# Patient Record
Sex: FEMALE | Race: WHITE | NOT HISPANIC OR LATINO | Employment: OTHER | RURAL
[De-identification: names, ages, dates, MRNs, and addresses within clinical notes are randomized per-mention and may not be internally consistent; named-entity substitution may affect disease eponyms.]

---

## 2021-04-08 DIAGNOSIS — G43.101 MIGRAINE WITH AURA AND WITH STATUS MIGRAINOSUS, NOT INTRACTABLE: Primary | ICD-10-CM

## 2021-04-08 RX ORDER — HYDROCHLOROTHIAZIDE 25 MG/1
25 TABLET ORAL DAILY
COMMUNITY
Start: 2021-03-08 | End: 2021-05-25 | Stop reason: SDUPTHER

## 2021-04-08 RX ORDER — ATORVASTATIN CALCIUM 20 MG/1
20 TABLET, FILM COATED ORAL DAILY
COMMUNITY
Start: 2021-02-14 | End: 2021-05-25 | Stop reason: SDUPTHER

## 2021-04-08 RX ORDER — SUMATRIPTAN SUCCINATE 100 MG/1
100 TABLET ORAL
COMMUNITY
End: 2021-04-08 | Stop reason: SDUPTHER

## 2021-04-08 RX ORDER — SUMATRIPTAN SUCCINATE 100 MG/1
TABLET ORAL
Qty: 9 TABLET | Refills: 5 | Status: SHIPPED | OUTPATIENT
Start: 2021-04-08 | End: 2021-08-26

## 2021-04-08 RX ORDER — LEVOTHYROXINE SODIUM 88 UG/1
88 TABLET ORAL DAILY
COMMUNITY
Start: 2021-02-14 | End: 2021-06-07 | Stop reason: SDUPTHER

## 2021-04-08 RX ORDER — PROMETHAZINE HYDROCHLORIDE 25 MG/1
50 TABLET ORAL EVERY 4 HOURS PRN
COMMUNITY
End: 2021-12-01 | Stop reason: SDUPTHER

## 2021-04-08 RX ORDER — SUMATRIPTAN SUCCINATE 6 MG/.5ML
0.5 INJECTION, SOLUTION SUBCUTANEOUS
COMMUNITY
End: 2023-12-18 | Stop reason: SDUPTHER

## 2021-05-19 ENCOUNTER — OFFICE VISIT (OUTPATIENT)
Dept: FAMILY MEDICINE | Facility: CLINIC | Age: 66
End: 2021-05-19
Payer: MEDICARE

## 2021-05-19 VITALS
BODY MASS INDEX: 36.29 KG/M2 | WEIGHT: 231.19 LBS | HEART RATE: 64 BPM | OXYGEN SATURATION: 96 % | SYSTOLIC BLOOD PRESSURE: 138 MMHG | HEIGHT: 67 IN | RESPIRATION RATE: 20 BRPM | DIASTOLIC BLOOD PRESSURE: 87 MMHG | TEMPERATURE: 98 F

## 2021-05-19 DIAGNOSIS — Z79.899 ENCOUNTER FOR LONG-TERM (CURRENT) USE OF OTHER MEDICATIONS: ICD-10-CM

## 2021-05-19 DIAGNOSIS — E78.2 MIXED HYPERLIPIDEMIA: ICD-10-CM

## 2021-05-19 DIAGNOSIS — K80.20 CALCULUS OF GALLBLADDER WITHOUT CHOLECYSTITIS WITHOUT OBSTRUCTION: ICD-10-CM

## 2021-05-19 DIAGNOSIS — E03.9 ACQUIRED HYPOTHYROIDISM: ICD-10-CM

## 2021-05-19 DIAGNOSIS — Z11.59 NEED FOR HEPATITIS C SCREENING TEST: ICD-10-CM

## 2021-05-19 DIAGNOSIS — G43.709 CHRONIC MIGRAINE WITHOUT AURA WITHOUT STATUS MIGRAINOSUS, NOT INTRACTABLE: ICD-10-CM

## 2021-05-19 DIAGNOSIS — I10 ESSENTIAL HYPERTENSION: Primary | ICD-10-CM

## 2021-05-19 PROCEDURE — 99214 OFFICE O/P EST MOD 30 MIN: CPT | Mod: ,,, | Performed by: NURSE PRACTITIONER

## 2021-05-19 PROCEDURE — 99214 PR OFFICE/OUTPT VISIT, EST, LEVL IV, 30-39 MIN: ICD-10-PCS | Mod: ,,, | Performed by: NURSE PRACTITIONER

## 2021-05-25 DIAGNOSIS — E78.2 MIXED HYPERLIPIDEMIA: ICD-10-CM

## 2021-05-25 DIAGNOSIS — I10 ESSENTIAL HYPERTENSION: Primary | ICD-10-CM

## 2021-05-25 RX ORDER — HYDROCHLOROTHIAZIDE 25 MG/1
25 TABLET ORAL DAILY
Qty: 90 TABLET | Refills: 1 | Status: SHIPPED | OUTPATIENT
Start: 2021-05-25 | End: 2021-11-24

## 2021-05-25 RX ORDER — ATORVASTATIN CALCIUM 20 MG/1
20 TABLET, FILM COATED ORAL DAILY
Qty: 90 TABLET | Refills: 3 | Status: SHIPPED | OUTPATIENT
Start: 2021-05-25 | End: 2021-12-01 | Stop reason: SDUPTHER

## 2021-06-07 RX ORDER — LEVOTHYROXINE SODIUM 88 UG/1
88 TABLET ORAL DAILY
Qty: 90 TABLET | Refills: 1 | Status: SHIPPED | OUTPATIENT
Start: 2021-06-07 | End: 2021-12-01 | Stop reason: SDUPTHER

## 2021-06-24 ENCOUNTER — OFFICE VISIT (OUTPATIENT)
Dept: FAMILY MEDICINE | Facility: CLINIC | Age: 66
End: 2021-06-24
Payer: MEDICARE

## 2021-06-24 VITALS
OXYGEN SATURATION: 98 % | HEIGHT: 67 IN | BODY MASS INDEX: 35.75 KG/M2 | RESPIRATION RATE: 20 BRPM | DIASTOLIC BLOOD PRESSURE: 78 MMHG | HEART RATE: 75 BPM | SYSTOLIC BLOOD PRESSURE: 123 MMHG | TEMPERATURE: 97 F | WEIGHT: 227.81 LBS

## 2021-06-24 DIAGNOSIS — M85.89 OTHER SPECIFIED DISORDERS OF BONE DENSITY AND STRUCTURE, MULTIPLE SITES: ICD-10-CM

## 2021-06-24 DIAGNOSIS — Z12.31 ENCOUNTER FOR SCREENING MAMMOGRAM FOR MALIGNANT NEOPLASM OF BREAST: ICD-10-CM

## 2021-06-24 DIAGNOSIS — E03.9 HYPOTHYROIDISM (ACQUIRED): ICD-10-CM

## 2021-06-24 DIAGNOSIS — Z00.00 ROUTINE GENERAL MEDICAL EXAMINATION AT A HEALTH CARE FACILITY: Primary | ICD-10-CM

## 2021-06-24 DIAGNOSIS — I10 ESSENTIAL HYPERTENSION: ICD-10-CM

## 2021-06-24 DIAGNOSIS — Z13.5 ENCOUNTER FOR SCREENING FOR EYE AND EAR DISORDERS: ICD-10-CM

## 2021-06-24 PROCEDURE — G0402 PR WELCOME MEDICARE PREVENTIVE VISIT NEW ENROLLEE: ICD-10-PCS | Mod: ,,, | Performed by: NURSE PRACTITIONER

## 2021-06-24 PROCEDURE — G0402 INITIAL PREVENTIVE EXAM: HCPCS | Mod: ,,, | Performed by: NURSE PRACTITIONER

## 2021-06-24 RX ORDER — KETOROLAC TROMETHAMINE 10 MG/1
10 TABLET, FILM COATED ORAL EVERY 6 HOURS
COMMUNITY
Start: 2021-04-20 | End: 2021-12-01

## 2021-07-01 ENCOUNTER — HOSPITAL ENCOUNTER (OUTPATIENT)
Dept: RADIOLOGY | Facility: HOSPITAL | Age: 66
Discharge: HOME | End: 2021-07-01
Attending: NURSE PRACTITIONER
Payer: MEDICARE

## 2021-07-01 VITALS — BODY MASS INDEX: 34.1 KG/M2 | HEIGHT: 68 IN | WEIGHT: 225 LBS

## 2021-07-01 DIAGNOSIS — M85.89 OTHER SPECIFIED DISORDERS OF BONE DENSITY AND STRUCTURE, MULTIPLE SITES: ICD-10-CM

## 2021-07-01 DIAGNOSIS — Z12.31 ENCOUNTER FOR SCREENING MAMMOGRAM FOR MALIGNANT NEOPLASM OF BREAST: ICD-10-CM

## 2021-07-01 PROCEDURE — 77080 DEXA BONE DENSITY SPINE HIP: ICD-10-PCS | Mod: 26,,, | Performed by: RADIOLOGY

## 2021-07-01 PROCEDURE — 77080 DXA BONE DENSITY AXIAL: CPT | Mod: 26,,, | Performed by: RADIOLOGY

## 2021-07-01 PROCEDURE — 77067 SCR MAMMO BI INCL CAD: CPT | Mod: TC

## 2021-07-01 PROCEDURE — 77080 DXA BONE DENSITY AXIAL: CPT | Mod: TC

## 2021-07-14 ENCOUNTER — OFFICE VISIT (OUTPATIENT)
Dept: FAMILY MEDICINE | Facility: CLINIC | Age: 66
End: 2021-07-14
Payer: MEDICARE

## 2021-07-14 VITALS
OXYGEN SATURATION: 97 % | DIASTOLIC BLOOD PRESSURE: 80 MMHG | RESPIRATION RATE: 20 BRPM | WEIGHT: 230 LBS | HEART RATE: 75 BPM | SYSTOLIC BLOOD PRESSURE: 122 MMHG | BODY MASS INDEX: 36.1 KG/M2 | HEIGHT: 67 IN | TEMPERATURE: 97 F

## 2021-07-14 DIAGNOSIS — S16.1XXA STRAIN OF NECK MUSCLE, INITIAL ENCOUNTER: Primary | ICD-10-CM

## 2021-07-14 DIAGNOSIS — M54.9 UPPER BACK PAIN ON RIGHT SIDE: ICD-10-CM

## 2021-07-14 PROCEDURE — 96372 THER/PROPH/DIAG INJ SC/IM: CPT | Mod: ,,, | Performed by: NURSE PRACTITIONER

## 2021-07-14 PROCEDURE — 99213 OFFICE O/P EST LOW 20 MIN: CPT | Mod: 25,,, | Performed by: NURSE PRACTITIONER

## 2021-07-14 PROCEDURE — 99213 PR OFFICE/OUTPT VISIT, EST, LEVL III, 20-29 MIN: ICD-10-PCS | Mod: 25,,, | Performed by: NURSE PRACTITIONER

## 2021-07-14 PROCEDURE — 96372 PR INJECTION,THERAP/PROPH/DIAG2ST, IM OR SUBCUT: ICD-10-PCS | Mod: ,,, | Performed by: NURSE PRACTITIONER

## 2021-07-14 RX ORDER — METHYLPREDNISOLONE ACETATE 40 MG/ML
40 INJECTION, SUSPENSION INTRA-ARTICULAR; INTRALESIONAL; INTRAMUSCULAR; SOFT TISSUE
Status: COMPLETED | OUTPATIENT
Start: 2021-07-14 | End: 2021-07-14

## 2021-07-14 RX ORDER — METHOCARBAMOL 750 MG/1
750 TABLET, FILM COATED ORAL 4 TIMES DAILY PRN
Qty: 40 TABLET | Refills: 0 | Status: SHIPPED | OUTPATIENT
Start: 2021-07-14 | End: 2021-07-24

## 2021-07-14 RX ORDER — DEXAMETHASONE SODIUM PHOSPHATE 4 MG/ML
6 INJECTION, SOLUTION INTRA-ARTICULAR; INTRALESIONAL; INTRAMUSCULAR; INTRAVENOUS; SOFT TISSUE
Status: COMPLETED | OUTPATIENT
Start: 2021-07-14 | End: 2021-07-14

## 2021-07-14 RX ADMIN — DEXAMETHASONE SODIUM PHOSPHATE 6 MG: 4 INJECTION, SOLUTION INTRA-ARTICULAR; INTRALESIONAL; INTRAMUSCULAR; INTRAVENOUS; SOFT TISSUE at 01:07

## 2021-07-14 RX ADMIN — METHYLPREDNISOLONE ACETATE 40 MG: 40 INJECTION, SUSPENSION INTRA-ARTICULAR; INTRALESIONAL; INTRAMUSCULAR; SOFT TISSUE at 01:07

## 2021-11-18 DIAGNOSIS — I10 ESSENTIAL HYPERTENSION: ICD-10-CM

## 2021-11-18 DIAGNOSIS — E03.9 ACQUIRED HYPOTHYROIDISM: ICD-10-CM

## 2021-11-23 ENCOUNTER — TELEPHONE (OUTPATIENT)
Dept: FAMILY MEDICINE | Facility: CLINIC | Age: 66
End: 2021-11-23
Payer: MEDICARE

## 2021-11-24 RX ORDER — LIOTHYRONINE SODIUM 5 UG/1
5 TABLET ORAL DAILY
Qty: 90 TABLET | Refills: 1 | Status: SHIPPED | OUTPATIENT
Start: 2021-11-24 | End: 2021-12-01

## 2021-11-24 RX ORDER — HYDROCHLOROTHIAZIDE 25 MG/1
25 TABLET ORAL DAILY
Qty: 90 TABLET | Refills: 1 | Status: SHIPPED | OUTPATIENT
Start: 2021-11-24 | End: 2021-12-01 | Stop reason: SDUPTHER

## 2021-12-01 ENCOUNTER — OFFICE VISIT (OUTPATIENT)
Dept: FAMILY MEDICINE | Facility: CLINIC | Age: 66
End: 2021-12-01
Payer: MEDICARE

## 2021-12-01 ENCOUNTER — PATIENT MESSAGE (OUTPATIENT)
Dept: FAMILY MEDICINE | Facility: CLINIC | Age: 66
End: 2021-12-01
Payer: MEDICARE

## 2021-12-01 VITALS
DIASTOLIC BLOOD PRESSURE: 78 MMHG | WEIGHT: 231.19 LBS | SYSTOLIC BLOOD PRESSURE: 128 MMHG | HEART RATE: 64 BPM | TEMPERATURE: 97 F | BODY MASS INDEX: 36.21 KG/M2 | OXYGEN SATURATION: 97 %

## 2021-12-01 DIAGNOSIS — I10 ESSENTIAL HYPERTENSION: ICD-10-CM

## 2021-12-01 DIAGNOSIS — I10 PRIMARY HYPERTENSION: ICD-10-CM

## 2021-12-01 DIAGNOSIS — Z79.899 ENCOUNTER FOR LONG-TERM (CURRENT) USE OF OTHER MEDICATIONS: ICD-10-CM

## 2021-12-01 DIAGNOSIS — M15.9 PRIMARY OSTEOARTHRITIS INVOLVING MULTIPLE JOINTS: ICD-10-CM

## 2021-12-01 DIAGNOSIS — G43.709 CHRONIC MIGRAINE WITHOUT AURA WITHOUT STATUS MIGRAINOSUS, NOT INTRACTABLE: ICD-10-CM

## 2021-12-01 DIAGNOSIS — G43.101 MIGRAINE WITH AURA AND WITH STATUS MIGRAINOSUS, NOT INTRACTABLE: ICD-10-CM

## 2021-12-01 DIAGNOSIS — E03.9 HYPOTHYROIDISM (ACQUIRED): Primary | ICD-10-CM

## 2021-12-01 DIAGNOSIS — E78.2 MIXED HYPERLIPIDEMIA: ICD-10-CM

## 2021-12-01 PROCEDURE — 99214 OFFICE O/P EST MOD 30 MIN: CPT | Mod: ,,, | Performed by: NURSE PRACTITIONER

## 2021-12-01 PROCEDURE — 99214 PR OFFICE/OUTPT VISIT, EST, LEVL IV, 30-39 MIN: ICD-10-PCS | Mod: ,,, | Performed by: NURSE PRACTITIONER

## 2021-12-01 RX ORDER — HYDROCHLOROTHIAZIDE 25 MG/1
25 TABLET ORAL DAILY
Qty: 90 TABLET | Refills: 1 | Status: SHIPPED | OUTPATIENT
Start: 2021-12-01 | End: 2022-06-06 | Stop reason: SDUPTHER

## 2021-12-01 RX ORDER — PROMETHAZINE HYDROCHLORIDE 25 MG/1
50 TABLET ORAL EVERY 4 HOURS PRN
Qty: 30 TABLET | Refills: 1 | Status: SHIPPED | OUTPATIENT
Start: 2021-12-01 | End: 2023-12-18 | Stop reason: SDUPTHER

## 2021-12-01 RX ORDER — LEVOTHYROXINE SODIUM 88 UG/1
88 TABLET ORAL DAILY
Qty: 90 TABLET | Refills: 1 | Status: SHIPPED | OUTPATIENT
Start: 2021-12-01 | End: 2022-06-06 | Stop reason: SDUPTHER

## 2021-12-01 RX ORDER — SUMATRIPTAN SUCCINATE 100 MG/1
100 TABLET ORAL
Qty: 9 TABLET | Refills: 5 | Status: SHIPPED | OUTPATIENT
Start: 2021-12-01 | End: 2022-04-18

## 2021-12-01 RX ORDER — ATORVASTATIN CALCIUM 20 MG/1
20 TABLET, FILM COATED ORAL DAILY
Qty: 90 TABLET | Refills: 3 | Status: SHIPPED | OUTPATIENT
Start: 2021-12-01 | End: 2022-05-16 | Stop reason: SDUPTHER

## 2022-03-11 DIAGNOSIS — Z71.89 COMPLEX CARE COORDINATION: ICD-10-CM

## 2022-04-25 ENCOUNTER — OFFICE VISIT (OUTPATIENT)
Dept: OBSTETRICS AND GYNECOLOGY | Facility: CLINIC | Age: 67
End: 2022-04-25
Payer: MEDICARE

## 2022-04-25 VITALS
BODY MASS INDEX: 35.55 KG/M2 | WEIGHT: 226.5 LBS | DIASTOLIC BLOOD PRESSURE: 81 MMHG | SYSTOLIC BLOOD PRESSURE: 111 MMHG | HEIGHT: 67 IN

## 2022-04-25 DIAGNOSIS — Z12.4 ENCOUNTER FOR SCREENING FOR MALIGNANT NEOPLASM OF CERVIX: ICD-10-CM

## 2022-04-25 DIAGNOSIS — Z01.419 WOMEN'S ANNUAL ROUTINE GYNECOLOGICAL EXAMINATION: Primary | ICD-10-CM

## 2022-04-25 DIAGNOSIS — N89.8 VAGINAL ITCHING: ICD-10-CM

## 2022-04-25 PROCEDURE — G0123 SCREEN CERV/VAG THIN LAYER: HCPCS | Mod: GCY | Performed by: OBSTETRICS & GYNECOLOGY

## 2022-04-25 PROCEDURE — G0101 PR CA SCREEN;PELVIC/BREAST EXAM: ICD-10-PCS | Mod: S$PBB,,, | Performed by: OBSTETRICS & GYNECOLOGY

## 2022-04-25 PROCEDURE — G0101 CA SCREEN;PELVIC/BREAST EXAM: HCPCS | Mod: S$PBB,,, | Performed by: OBSTETRICS & GYNECOLOGY

## 2022-04-25 PROCEDURE — 99213 OFFICE O/P EST LOW 20 MIN: CPT | Mod: PBBFAC | Performed by: OBSTETRICS & GYNECOLOGY

## 2022-04-25 RX ORDER — METRONIDAZOLE 7.5 MG/G
1 GEL VAGINAL NIGHTLY
Qty: 70 G | Refills: 0 | Status: SHIPPED | OUTPATIENT
Start: 2022-04-25 | End: 2022-04-30

## 2022-04-28 LAB
GH SERPL-MCNC: NORMAL NG/ML
INSULIN SERPL-ACNC: NORMAL U[IU]/ML
LAB AP CLINICAL INFORMATION: NORMAL
LAB AP GYN INTERPRETATION: NEGATIVE
LAB AP PAP DISCLAIMER COMMENTS: NORMAL
RENIN PLAS-CCNC: NORMAL NG/ML/H

## 2022-05-03 ENCOUNTER — OFFICE VISIT (OUTPATIENT)
Dept: FAMILY MEDICINE | Facility: CLINIC | Age: 67
End: 2022-05-03
Payer: MEDICARE

## 2022-05-03 VITALS
WEIGHT: 222 LBS | RESPIRATION RATE: 16 BRPM | BODY MASS INDEX: 34.84 KG/M2 | HEART RATE: 85 BPM | OXYGEN SATURATION: 99 % | DIASTOLIC BLOOD PRESSURE: 84 MMHG | TEMPERATURE: 98 F | HEIGHT: 67 IN | SYSTOLIC BLOOD PRESSURE: 118 MMHG

## 2022-05-03 DIAGNOSIS — J02.9 SORE THROAT: ICD-10-CM

## 2022-05-03 DIAGNOSIS — J40 BRONCHITIS: Primary | ICD-10-CM

## 2022-05-03 DIAGNOSIS — Z11.52 ENCOUNTER FOR SCREENING LABORATORY TESTING FOR COVID-19 VIRUS: ICD-10-CM

## 2022-05-03 LAB
CTP QC/QA: YES
CTP QC/QA: YES
FLUAV AG NPH QL: NEGATIVE
FLUBV AG NPH QL: NEGATIVE
S PYO RRNA THROAT QL PROBE: NEGATIVE
SARS-COV-2 AG RESP QL IA.RAPID: NEGATIVE

## 2022-05-03 PROCEDURE — 96372 THER/PROPH/DIAG INJ SC/IM: CPT | Mod: ,,, | Performed by: NURSE PRACTITIONER

## 2022-05-03 PROCEDURE — 99213 OFFICE O/P EST LOW 20 MIN: CPT | Mod: ,,, | Performed by: NURSE PRACTITIONER

## 2022-05-03 PROCEDURE — 87880 STREP A ASSAY W/OPTIC: CPT | Mod: RHCUB | Performed by: NURSE PRACTITIONER

## 2022-05-03 PROCEDURE — 96372 PR INJECTION,THERAP/PROPH/DIAG2ST, IM OR SUBCUT: ICD-10-PCS | Mod: ,,, | Performed by: NURSE PRACTITIONER

## 2022-05-03 PROCEDURE — 99213 PR OFFICE/OUTPT VISIT, EST, LEVL III, 20-29 MIN: ICD-10-PCS | Mod: ,,, | Performed by: NURSE PRACTITIONER

## 2022-05-03 PROCEDURE — 87428 SARSCOV & INF VIR A&B AG IA: CPT | Mod: RHCUB | Performed by: NURSE PRACTITIONER

## 2022-05-03 RX ORDER — CEFDINIR 300 MG/1
300 CAPSULE ORAL 2 TIMES DAILY
Qty: 20 CAPSULE | Refills: 0 | Status: SHIPPED | OUTPATIENT
Start: 2022-05-03 | End: 2022-05-13

## 2022-05-03 RX ORDER — DEXAMETHASONE SODIUM PHOSPHATE 4 MG/ML
6 INJECTION, SOLUTION INTRA-ARTICULAR; INTRALESIONAL; INTRAMUSCULAR; INTRAVENOUS; SOFT TISSUE
Status: COMPLETED | OUTPATIENT
Start: 2022-05-03 | End: 2022-05-03

## 2022-05-03 RX ORDER — PROMETHAZINE HYDROCHLORIDE AND DEXTROMETHORPHAN HYDROBROMIDE 6.25; 15 MG/5ML; MG/5ML
5 SYRUP ORAL EVERY 6 HOURS PRN
Qty: 150 ML | Refills: 0 | Status: SHIPPED | OUTPATIENT
Start: 2022-05-03 | End: 2022-05-13

## 2022-05-03 RX ORDER — PREDNISONE 20 MG/1
TABLET ORAL
Qty: 10 TABLET | Refills: 0 | Status: SHIPPED | OUTPATIENT
Start: 2022-05-03 | End: 2022-06-06

## 2022-05-03 RX ADMIN — DEXAMETHASONE SODIUM PHOSPHATE 6 MG: 4 INJECTION, SOLUTION INTRA-ARTICULAR; INTRALESIONAL; INTRAMUSCULAR; INTRAVENOUS; SOFT TISSUE at 02:05

## 2022-05-03 NOTE — PROGRESS NOTES
"Subjective:       Patient ID: Dominic Casas is a 66 y.o. female.    Chief Complaint: Sinusitis (Sore throat started on Sunday.   Cough described as deep/wet cough)    Presents to clinic as above. Also c/o body aches. No hx of asthma. Nonsmoker. No hx of COPD.    Review of Systems   Constitutional: Negative.    HENT: Positive for congestion, sinus pain and sore throat. Negative for ear pain.    Eyes: Negative.    Respiratory: Positive for cough.    Cardiovascular: Negative.    Musculoskeletal: Positive for myalgias.   Skin: Negative.    Neurological: Negative.           Reviewed family, medical, surgical, and social history.    Objective:      /84   Pulse 85   Temp 98.2 °F (36.8 °C)   Resp 16   Ht 5' 7" (1.702 m)   Wt 100.7 kg (222 lb)   SpO2 99%   BMI 34.77 kg/m²   Physical Exam  Vitals and nursing note reviewed.   Constitutional:       Appearance: Normal appearance.   HENT:      Head: Normocephalic and atraumatic.      Right Ear: Hearing, tympanic membrane, ear canal and external ear normal.      Left Ear: Hearing, tympanic membrane, ear canal and external ear normal.      Nose: Nasal tenderness, mucosal edema, congestion and rhinorrhea present. Rhinorrhea is purulent.      Right Turbinates: Enlarged and swollen.      Left Turbinates: Enlarged and swollen.      Right Sinus: Maxillary sinus tenderness and frontal sinus tenderness present.      Left Sinus: Maxillary sinus tenderness and frontal sinus tenderness present.      Mouth/Throat:      Mouth: Mucous membranes are moist.   Cardiovascular:      Rate and Rhythm: Normal rate and regular rhythm.      Heart sounds: Normal heart sounds.   Pulmonary:      Effort: Pulmonary effort is normal. No respiratory distress.      Breath sounds: No stridor. Rhonchi present. No wheezing or rales.   Chest:      Chest wall: No tenderness.   Skin:     General: Skin is warm and dry.   Neurological:      Mental Status: She is alert.   Psychiatric:         Mood and Affect: " Mood normal.         Behavior: Behavior normal.         Thought Content: Thought content normal.         Judgment: Judgment normal.            Office Visit on 05/03/2022   Component Date Value Ref Range Status    Rapid Strep A Screen 05/03/2022 Negative  Negative Final     Acceptable 05/03/2022 Yes   Final    SARS Coronavirus 2 Antigen 05/03/2022 Negative  Negative Final    Rapid Influenza A Ag 05/03/2022 Negative  Negative Final    Rapid Influenza B Ag 05/03/2022 Negative  Negative Final     Acceptable 05/03/2022 Yes   Final      Assessment:       1. Bronchitis    2. Sore throat    3. Encounter for screening laboratory testing for COVID-19 virus        Plan:       Bronchitis  -     cefdinir (OMNICEF) 300 MG capsule; Take 1 capsule (300 mg total) by mouth 2 (two) times daily. for 10 days  Dispense: 20 capsule; Refill: 0  -     predniSONE (DELTASONE) 20 MG tablet; Take 2 po daily  Dispense: 10 tablet; Refill: 0  -     promethazine-dextromethorphan (PROMETHAZINE-DM) 6.25-15 mg/5 mL Syrp; Take 5 mLs by mouth every 6 (six) hours as needed (cough).  Dispense: 150 mL; Refill: 0  -     dexamethasone injection 6 mg    Sore throat  -     POCT rapid strep A    Encounter for screening laboratory testing for COVID-19 virus  -     POCT SARS-COV2 (COVID) with Flu Antigen    Covid/flu neg  RTC PRN          Risks, benefits, and side effects were discussed with the patient. All questions were answered to the fullest satisfaction of the patient, and pt verbalized understanding and agreement to treatment plan. Pt was to call with any new or worsening symptoms, or present to the ER.

## 2022-05-16 DIAGNOSIS — E78.2 MIXED HYPERLIPIDEMIA: ICD-10-CM

## 2022-05-16 RX ORDER — ATORVASTATIN CALCIUM 20 MG/1
20 TABLET, FILM COATED ORAL DAILY
Qty: 90 TABLET | Refills: 3 | Status: SHIPPED | OUTPATIENT
Start: 2022-05-16 | End: 2022-06-06 | Stop reason: SDUPTHER

## 2022-05-16 NOTE — TELEPHONE ENCOUNTER
----- Message from Amber Ly sent at 5/16/2022  9:20 AM CDT -----  Patient needs a refill on DELTASONE called into [facility]  pharmacy on [address]  at [phone].  Please call patient at [phone] if you have any questions. Thanks!

## 2022-05-16 NOTE — PROGRESS NOTES
Dominic ROBB Augusto female  for   Chief Complaint   Patient presents with    Annual Exam     CU/Pap,     Vaginal Itching     C/O vaginal itching at night x 1 month      OB History        0    Para   0    Term   0       0    AB   0    Living   0       SAB   0    IAB   0    Ectopic   0    Multiple   0    Live Births   0                  Past Medical History:   Diagnosis Date    Cholelithiasis     21 per US (report scanned in); HIDA scan 21 normal w/ EF49% (scanned in)    Chronic migraine without aura or status migrainosus     Hyperlipidemia     Hypertension     Hypothyroidism     Lumbar degenerative disc disease     Osteoarthritis of multiple joints     Spondylosis of cervical spine     Spondylosis, lumbosacral       Past Surgical History:   Procedure Laterality Date    PARTIAL KNEE ARTHROPLASTY Right 2017    Dr. Jacobs    SHOULDER ARTHROSCOPY Left 2012    left rotator cuff repair    TOTAL HIP ARTHROPLASTY Left 2017    TOTAL HIP ARTHROPLASTY Right 10/07/2019    [place]      Review of patient's allergies indicates:  No Known Allergies          Physical exam:     General Appearance: healthy    Chest:chest clear, no wheezing, rales, normal symmetric air entry, Heart exam - S1, S2 normal, no murmur, no gallop, rate regular    Breast:  normal appearance, no masses or tenderness    Abdomen:Normal, benign.    Pelvic: Pelvic exam: normal external genitalia, vulva, vagina, cervix, uterus and adnexa, VULVA: normal appearing vulva with no masses, tenderness or lesions, CERVIX: normal appearing cervix without discharge or lesions, UTERUS: uterus is normal size, shape, consistency and nontender, ADNEXA: normal adnexa in size, nontender and no masses, RECTAL: normal rectal, no masses, guaiac negative stool obtained.     Extremity:normal    Skin: normal exam        Assessment:   Problem List Items Addressed This Visit    None     Visit Diagnoses     Women's annual routine  gynecological examination    -  Primary    Relevant Orders    ThinPrep Pap Test (Completed)    Vaginal itching        Encounter for screening for malignant neoplasm of cervix        Relevant Orders    ThinPrep Pap Test (Completed)           Plan:  The patient has a mammogram appointment in July.  She had a Cologuard 1-2 years ago.  A Pap smear was done today.  Her vaginal pH today is 7.5 and she was prescribed Metrogel.

## 2022-05-16 NOTE — TELEPHONE ENCOUNTER
This was prescribed by a different provider for bronchitis. We do not refill these medications. Pt will need to be evaluated, again, if not better.

## 2022-05-16 NOTE — TELEPHONE ENCOUNTER
----- Message from Ofelia Chopra sent at 5/16/2022 11:23 AM CDT -----  Patient needs a refill on atorvastatin called into [facility]  pharmacy.   Please call patient at [phone] if you have any questions. Thanks!

## 2022-06-06 ENCOUNTER — OFFICE VISIT (OUTPATIENT)
Dept: FAMILY MEDICINE | Facility: CLINIC | Age: 67
End: 2022-06-06
Payer: MEDICARE

## 2022-06-06 VITALS
DIASTOLIC BLOOD PRESSURE: 82 MMHG | HEIGHT: 67 IN | SYSTOLIC BLOOD PRESSURE: 124 MMHG | RESPIRATION RATE: 20 BRPM | BODY MASS INDEX: 36 KG/M2 | HEART RATE: 66 BPM | TEMPERATURE: 97 F | WEIGHT: 229.38 LBS | OXYGEN SATURATION: 98 %

## 2022-06-06 DIAGNOSIS — E78.2 MIXED HYPERLIPIDEMIA: ICD-10-CM

## 2022-06-06 DIAGNOSIS — E03.9 HYPOTHYROIDISM (ACQUIRED): ICD-10-CM

## 2022-06-06 DIAGNOSIS — I10 ESSENTIAL HYPERTENSION: Primary | ICD-10-CM

## 2022-06-06 LAB
ALBUMIN SERPL BCP-MCNC: 3.9 G/DL (ref 3.5–5)
ALBUMIN/GLOB SERPL: 1.1 {RATIO}
ALP SERPL-CCNC: 110 U/L (ref 55–142)
ALT SERPL W P-5'-P-CCNC: 39 U/L (ref 13–56)
ANION GAP SERPL CALCULATED.3IONS-SCNC: 12 MMOL/L (ref 7–16)
AST SERPL W P-5'-P-CCNC: 26 U/L (ref 15–37)
BASOPHILS # BLD AUTO: 0.03 K/UL (ref 0–0.2)
BASOPHILS NFR BLD AUTO: 0.5 % (ref 0–1)
BILIRUB SERPL-MCNC: 0.4 MG/DL (ref 0–1.2)
BILIRUB UR QL STRIP: NEGATIVE
BUN SERPL-MCNC: 14 MG/DL (ref 7–18)
BUN/CREAT SERPL: 13 (ref 6–20)
CALCIUM SERPL-MCNC: 9.5 MG/DL (ref 8.5–10.1)
CHLORIDE SERPL-SCNC: 104 MMOL/L (ref 98–107)
CHOLEST SERPL-MCNC: 137 MG/DL (ref 0–200)
CHOLEST/HDLC SERPL: 2.9 {RATIO}
CLARITY UR: CLEAR
CO2 SERPL-SCNC: 29 MMOL/L (ref 21–32)
COLOR UR: YELLOW
CREAT SERPL-MCNC: 1.04 MG/DL (ref 0.55–1.02)
CREAT UR-MCNC: 90 MG/DL (ref 28–219)
DIFFERENTIAL METHOD BLD: ABNORMAL
EOSINOPHIL # BLD AUTO: 0.31 K/UL (ref 0–0.5)
EOSINOPHIL NFR BLD AUTO: 4.9 % (ref 1–4)
ERYTHROCYTE [DISTWIDTH] IN BLOOD BY AUTOMATED COUNT: 15.1 % (ref 11.5–14.5)
GLOBULIN SER-MCNC: 3.5 G/DL (ref 2–4)
GLUCOSE SERPL-MCNC: 91 MG/DL (ref 74–106)
GLUCOSE UR STRIP-MCNC: NEGATIVE MG/DL
HCT VFR BLD AUTO: 41 % (ref 38–47)
HDLC SERPL-MCNC: 48 MG/DL (ref 40–60)
HGB BLD-MCNC: 13 G/DL (ref 12–16)
IMM GRANULOCYTES # BLD AUTO: 0.04 K/UL (ref 0–0.04)
IMM GRANULOCYTES NFR BLD: 0.6 % (ref 0–0.4)
KETONES UR STRIP-SCNC: NEGATIVE MG/DL
LDLC SERPL CALC-MCNC: 67 MG/DL
LDLC/HDLC SERPL: 1.4 {RATIO}
LEUKOCYTE ESTERASE UR QL STRIP: NEGATIVE
LYMPHOCYTES # BLD AUTO: 1.25 K/UL (ref 1–4.8)
LYMPHOCYTES NFR BLD AUTO: 19.9 % (ref 27–41)
MCH RBC QN AUTO: 27.4 PG (ref 27–31)
MCHC RBC AUTO-ENTMCNC: 31.7 G/DL (ref 32–36)
MCV RBC AUTO: 86.5 FL (ref 80–96)
MICROALBUMIN UR-MCNC: <0.5 MG/DL (ref 0–2.8)
MICROALBUMIN/CREAT RATIO PNL UR: <5.6 MG/G (ref 0–30)
MONOCYTES # BLD AUTO: 0.5 K/UL (ref 0–0.8)
MONOCYTES NFR BLD AUTO: 8 % (ref 2–6)
MPC BLD CALC-MCNC: 9.5 FL (ref 9.4–12.4)
NEUTROPHILS # BLD AUTO: 4.15 K/UL (ref 1.8–7.7)
NEUTROPHILS NFR BLD AUTO: 66.1 % (ref 53–65)
NITRITE UR QL STRIP: NEGATIVE
NONHDLC SERPL-MCNC: 89 MG/DL
NRBC # BLD AUTO: 0 X10E3/UL
NRBC, AUTO (.00): 0 %
PH UR STRIP: 7.5 PH UNITS
PLATELET # BLD AUTO: 258 K/UL (ref 150–400)
POTASSIUM SERPL-SCNC: 4.3 MMOL/L (ref 3.5–5.1)
PROT SERPL-MCNC: 7.4 G/DL (ref 6.4–8.2)
PROT UR QL STRIP: NEGATIVE
RBC # BLD AUTO: 4.74 M/UL (ref 4.2–5.4)
RBC # UR STRIP: NEGATIVE /UL
SODIUM SERPL-SCNC: 141 MMOL/L (ref 136–145)
SP GR UR STRIP: 1.01
TRIGL SERPL-MCNC: 109 MG/DL (ref 35–150)
UROBILINOGEN UR STRIP-ACNC: 0.2 MG/DL
VLDLC SERPL-MCNC: 22 MG/DL
WBC # BLD AUTO: 6.28 K/UL (ref 4.5–11)

## 2022-06-06 PROCEDURE — 81003 URINALYSIS AUTO W/O SCOPE: CPT | Mod: QW,,, | Performed by: CLINICAL MEDICAL LABORATORY

## 2022-06-06 PROCEDURE — 99214 PR OFFICE/OUTPT VISIT, EST, LEVL IV, 30-39 MIN: ICD-10-PCS | Mod: ,,, | Performed by: NURSE PRACTITIONER

## 2022-06-06 PROCEDURE — 80061 LIPID PANEL: ICD-10-PCS | Mod: ,,, | Performed by: CLINICAL MEDICAL LABORATORY

## 2022-06-06 PROCEDURE — 85025 COMPLETE CBC W/AUTO DIFF WBC: CPT | Mod: ,,, | Performed by: CLINICAL MEDICAL LABORATORY

## 2022-06-06 PROCEDURE — 85025 CBC WITH DIFFERENTIAL: ICD-10-PCS | Mod: ,,, | Performed by: CLINICAL MEDICAL LABORATORY

## 2022-06-06 PROCEDURE — 82043 UR ALBUMIN QUANTITATIVE: CPT | Mod: ,,, | Performed by: CLINICAL MEDICAL LABORATORY

## 2022-06-06 PROCEDURE — 82570 ASSAY OF URINE CREATININE: CPT | Mod: ,,, | Performed by: CLINICAL MEDICAL LABORATORY

## 2022-06-06 PROCEDURE — 80061 LIPID PANEL: CPT | Mod: ,,, | Performed by: CLINICAL MEDICAL LABORATORY

## 2022-06-06 PROCEDURE — 99214 OFFICE O/P EST MOD 30 MIN: CPT | Mod: ,,, | Performed by: NURSE PRACTITIONER

## 2022-06-06 PROCEDURE — 82570 MICROALBUMIN / CREATININE RATIO URINE: ICD-10-PCS | Mod: ,,, | Performed by: CLINICAL MEDICAL LABORATORY

## 2022-06-06 PROCEDURE — 80053 COMPREHEN METABOLIC PANEL: CPT | Mod: ,,, | Performed by: CLINICAL MEDICAL LABORATORY

## 2022-06-06 PROCEDURE — 80053 COMPREHENSIVE METABOLIC PANEL: ICD-10-PCS | Mod: ,,, | Performed by: CLINICAL MEDICAL LABORATORY

## 2022-06-06 PROCEDURE — 82043 MICROALBUMIN / CREATININE RATIO URINE: ICD-10-PCS | Mod: ,,, | Performed by: CLINICAL MEDICAL LABORATORY

## 2022-06-06 PROCEDURE — 81003 URINALYSIS, REFLEX TO URINE CULTURE: ICD-10-PCS | Mod: QW,,, | Performed by: CLINICAL MEDICAL LABORATORY

## 2022-06-06 RX ORDER — ATORVASTATIN CALCIUM 20 MG/1
20 TABLET, FILM COATED ORAL DAILY
Qty: 90 TABLET | Refills: 3 | Status: SHIPPED | OUTPATIENT
Start: 2022-06-06 | End: 2023-07-12

## 2022-06-06 RX ORDER — HYDROCHLOROTHIAZIDE 25 MG/1
25 TABLET ORAL DAILY
Qty: 90 TABLET | Refills: 1 | Status: SHIPPED | OUTPATIENT
Start: 2022-06-06 | End: 2022-11-21 | Stop reason: ALTCHOICE

## 2022-06-06 RX ORDER — LEVOTHYROXINE SODIUM 88 UG/1
88 TABLET ORAL DAILY
Qty: 90 TABLET | Refills: 1 | Status: SHIPPED | OUTPATIENT
Start: 2022-06-06 | End: 2022-12-12 | Stop reason: SDUPTHER

## 2022-06-06 NOTE — PROGRESS NOTES
[facility]       PATIENT NAME: Dominic Casas   : 1955    AGE: 66 y.o. DATE: 2022    MRN: [id number]        Reason for Visit / Chief Complaint:  Follow-up (Pt presents for 6 month HTN, HLD, and hypothyroidism follow up. She is fasting.), Hypertension, Hyperlipidemia, and Hypothyroidism     Subjective:     HPI:  66 yr old WF presents to the office for 6m f/u   Denies any new complaints   Reports she has been struggling with weight loss for quite some time - has decreased caloric intake and increase exercise and physical activity  Reports she will lose down a few pounds then it will creep back up     Review of Systems:    Pertinent items are above noted in HPI.  Review of patient's allergies indicates:  No Known Allergies     Med List:  Current Outpatient Medications on File Prior to Visit   Medication Sig Dispense Refill    promethazine (PHENERGAN) 25 MG tablet Take 2 tablets (50 mg total) by mouth every 4 (four) hours as needed for Nausea. 1-2 tablet every 4 hours as needed 30 tablet 1    sumatriptan (IMITREX) 100 MG tablet Take 1 tablet (100 mg total) by mouth every 2 (two) hours as needed for Migraine. May repeat x 1 dose in 2 hours. No more than 200 mg (2 tablets) in 24 hours. 12 tablet 5    SUMAtriptan succinate 6 mg/0.5 mL Crtg Inject into the skin.      [DISCONTINUED] atorvastatin (LIPITOR) 20 MG tablet Take 1 tablet (20 mg total) by mouth once daily. 90 tablet 3    [DISCONTINUED] hydroCHLOROthiazide (HYDRODIURIL) 25 MG tablet Take 1 tablet (25 mg total) by mouth once daily. 90 tablet 1    [DISCONTINUED] levothyroxine (SYNTHROID) 88 MCG tablet Take 1 tablet (88 mcg total) by mouth once daily. 90 tablet 1    [DISCONTINUED] predniSONE (DELTASONE) 20 MG tablet Take 2 po daily 10 tablet 0     No current facility-administered medications on file prior to visit.       Medical/Social/Family History:  Past Medical History:   Diagnosis Date    Cholelithiasis      "4/26/21 per US (report scanned in); HIDA scan 5/7/21 normal w/ EF49% (scanned in)    Chronic migraine without aura or status migrainosus     Hyperlipidemia     Hypertension     Hypothyroidism     Lumbar degenerative disc disease     Osteoarthritis of multiple joints     Spondylosis of cervical spine     Spondylosis, lumbosacral       Social History     Tobacco Use   Smoking Status Never Smoker   Smokeless Tobacco Never Used      Social History     Substance and Sexual Activity   Alcohol Use Not Currently       Family History   Problem Relation Age of Onset    Alzheimer's disease Mother     Dementia Mother     Colon cancer Mother     Cancer Father         Waldenstrom's macroglobulinemia    No Known Problems Sister     No Known Problems Maternal Grandmother     Migraines Maternal Grandfather     No Known Problems Paternal Grandmother     No Known Problems Paternal Grandfather     No Known Problems Sister       Past Surgical History:   Procedure Laterality Date    PARTIAL KNEE ARTHROPLASTY Right 06/2017    Dr. Jacobs    SHOULDER ARTHROSCOPY Left 07/2012    left rotator cuff repair    TOTAL HIP ARTHROPLASTY Left 09/2017    TOTAL HIP ARTHROPLASTY Right 10/07/2019    [place]        Objective:      Vitals:    06/06/22 0738   BP: 124/82   BP Location: Left arm   Patient Position: Sitting   BP Method: Large (Manual)   Pulse: 66   Resp: 20   Temp: 97.3 °F (36.3 °C)   TempSrc: Temporal   SpO2: 98%   Weight: 104.1 kg (229 lb 6.4 oz)   Height: 5' 7" (1.702 m)     Body mass index is 35.93 kg/m².     Physical Exam:    General: well developed, well nourished    Head: normocephalic, atraumatic    Respiratory: unlabored respirations, no intercostal retractions or accessory muscle use, clear to auscultation without rales or wheezes    Cardiovascular: normal rate and regular rhythm, S1 and S2 normal, no murmurs noted    Abdomen: soft, nontender    Musculoskeletal: negative; full range of motion, no " tenderness, palpable spasm or pain on motion    Lymphadenopathy: No cervical or supraclavicular adenopathy    Neurological: normal without focal findings and mental status, speech normal, alert and oriented x3    Skin: Skin color, texture, turgor normal. No rashes or lesions    Psych: no auditory or visual hallucinations, no anxiety, no depression/worrying alot       Assessment:          ICD-10-CM ICD-9-CM   1. Essential hypertension  I10 401.9   2. Hypothyroidism (acquired)  E03.9 244.9   3. Mixed hyperlipidemia  E78.2 272.2        Plan:       Essential hypertension  -     hydroCHLOROthiazide (HYDRODIURIL) 25 MG tablet; Take 1 tablet (25 mg total) by mouth once daily.  Dispense: 90 tablet; Refill: 1  -     CBC Auto Differential; Future; Expected date: 06/06/2022  -     Comprehensive Metabolic Panel; Future; Expected date: 06/06/2022  -     Lipid Panel; Future; Expected date: 06/06/2022  -     Microalbumin/Creatinine Ratio, Urine; Future; Expected date: 06/06/2022  -     Urinalysis, Reflex to Urine Culture; Future; Expected date: 06/06/2022    Hypothyroidism (acquired)  -     levothyroxine (SYNTHROID) 88 MCG tablet; Take 1 tablet (88 mcg total) by mouth once daily.  Dispense: 90 tablet; Refill: 1    Mixed hyperlipidemia  -     atorvastatin (LIPITOR) 20 MG tablet; Take 1 tablet (20 mg total) by mouth once daily.  Dispense: 90 tablet; Refill: 3  -     Lipid Panel; Future; Expected date: 06/06/2022        Current Outpatient Medications:     promethazine (PHENERGAN) 25 MG tablet, Take 2 tablets (50 mg total) by mouth every 4 (four) hours as needed for Nausea. 1-2 tablet every 4 hours as needed, Disp: 30 tablet, Rfl: 1    sumatriptan (IMITREX) 100 MG tablet, Take 1 tablet (100 mg total) by mouth every 2 (two) hours as needed for Migraine. May repeat x 1 dose in 2 hours. No more than 200 mg (2 tablets) in 24 hours., Disp: 12 tablet, Rfl: 5    SUMAtriptan succinate 6 mg/0.5 mL Crtg, Inject into the skin., Disp: , Rfl:      atorvastatin (LIPITOR) 20 MG tablet, Take 1 tablet (20 mg total) by mouth once daily., Disp: 90 tablet, Rfl: 3    hydroCHLOROthiazide (HYDRODIURIL) 25 MG tablet, Take 1 tablet (25 mg total) by mouth once daily., Disp: 90 tablet, Rfl: 1    levothyroxine (SYNTHROID) 88 MCG tablet, Take 1 tablet (88 mcg total) by mouth once daily., Disp: 90 tablet, Rfl: 1      New & refilled meds:  Requested Prescriptions     Signed Prescriptions Disp Refills    hydroCHLOROthiazide (HYDRODIURIL) 25 MG tablet 90 tablet 1     Sig: Take 1 tablet (25 mg total) by mouth once daily.    levothyroxine (SYNTHROID) 88 MCG tablet 90 tablet 1     Sig: Take 1 tablet (88 mcg total) by mouth once daily.    atorvastatin (LIPITOR) 20 MG tablet 90 tablet 3     Sig: Take 1 tablet (20 mg total) by mouth once daily.       Health Maintenance/preventive screenings:  urinary microalbumin ordered     Treatment Recommendations:    Orders and follow up as documented in patient record.    Return to clinic in 6 months for htn, hld, hypothyroidism and as needed.    Signature: Cailin Osorio for HPI/ROS submitted by the patient on 6/5/2022  activity change: Yes  unexpected weight change: No  neck pain: No  hearing loss: No  rhinorrhea: No  trouble swallowing: No  eye discharge: No  visual disturbance: No  chest tightness: No  wheezing: No  chest pain: No  palpitations: No  blood in stool: No  constipation: No  vomiting: No  diarrhea: No  polydipsia: No  polyuria: No  difficulty urinating: No  hematuria: No  menstrual problem: No  dysuria: No  joint swelling: No  arthralgias: Yes  headaches: Yes  weakness: No  confusion: No  dysphoric mood: No

## 2022-07-07 ENCOUNTER — HOSPITAL ENCOUNTER (OUTPATIENT)
Dept: RADIOLOGY | Facility: HOSPITAL | Age: 67
Discharge: HOME | End: 2022-07-07
Attending: NURSE PRACTITIONER
Payer: MEDICARE

## 2022-07-07 DIAGNOSIS — Z12.31 ENCOUNTER FOR SCREENING MAMMOGRAM FOR BREAST CANCER: ICD-10-CM

## 2022-07-07 PROCEDURE — 77067 SCR MAMMO BI INCL CAD: CPT | Mod: TC

## 2022-07-31 ENCOUNTER — EXTERNAL CHRONIC CARE MANAGEMENT (OUTPATIENT)
Dept: FAMILY MEDICINE | Facility: CLINIC | Age: 67
End: 2022-07-31
Payer: MEDICARE

## 2022-07-31 PROCEDURE — G0511 PR CHRONIC CARE MGMT, RHC OR FQHC ONLY, 20 MINS OR MORE: ICD-10-PCS | Mod: ,,, | Performed by: NURSE PRACTITIONER

## 2022-07-31 PROCEDURE — G0511 CCM/BHI BY RHC/FQHC 20MIN MO: HCPCS | Mod: ,,, | Performed by: NURSE PRACTITIONER

## 2022-09-30 ENCOUNTER — EXTERNAL CHRONIC CARE MANAGEMENT (OUTPATIENT)
Dept: FAMILY MEDICINE | Facility: CLINIC | Age: 67
End: 2022-09-30
Payer: MEDICARE

## 2022-09-30 PROCEDURE — G0511 PR CHRONIC CARE MGMT, RHC OR FQHC ONLY, 20 MINS OR MORE: ICD-10-PCS | Mod: ,,, | Performed by: NURSE PRACTITIONER

## 2022-09-30 PROCEDURE — G0511 CCM/BHI BY RHC/FQHC 20MIN MO: HCPCS | Mod: ,,, | Performed by: NURSE PRACTITIONER

## 2022-10-11 ENCOUNTER — TELEPHONE (OUTPATIENT)
Dept: FAMILY MEDICINE | Facility: CLINIC | Age: 67
End: 2022-10-11
Payer: MEDICARE

## 2022-10-11 DIAGNOSIS — E03.9 HYPOTHYROIDISM (ACQUIRED): Primary | ICD-10-CM

## 2022-10-11 NOTE — TELEPHONE ENCOUNTER
"NELI Fairchild  Phone Number: [phone]     pt. c/o thyroid issues and not being able to lose weight.  cc asked if she was doing "the things to lose weight" . pt. reports that she does do some walking but no formal exercise and that she has arthritis that makes exercise difficult. cc educated patient on water aerobics. pt. said she had a friend that suggested the same thing. Per review of her chart pt.  has not had thyroid testing since 5/19/2021. Do you want to order labs?   Pt also reported blood pressures as high, but was not able to communicate the blood pressure numbers. CC educated pt. to keep a blood pressure log.  Im happy to help. Please let me know if theres anything I can do. Thank you.   Stefania MARIA w/ me 12/01/21; saw Barbara 6/6 while I was out & thyroid function tests were not done.  She isn't scheduled to see me again until 12/7/22.    Msg sent back to Stefania Proctor:  She saw someone filling in for me in June & she didn't order thyroid labs, so yes I will put those orders in and she could come by for labs.  Advised to check & keep a daily BP log x 2 weeks & let us know if SBP is running > 130 or DBP > 80 that would be great!  I also noted to notify pt it is time for flu shot & she needs Prevnar-20.  "

## 2022-10-31 ENCOUNTER — EXTERNAL CHRONIC CARE MANAGEMENT (OUTPATIENT)
Dept: FAMILY MEDICINE | Facility: CLINIC | Age: 67
End: 2022-10-31
Payer: MEDICARE

## 2022-10-31 DIAGNOSIS — G43.101 MIGRAINE WITH AURA AND WITH STATUS MIGRAINOSUS, NOT INTRACTABLE: ICD-10-CM

## 2022-10-31 PROCEDURE — G0511 CCM/BHI BY RHC/FQHC 20MIN MO: HCPCS | Mod: ,,, | Performed by: NURSE PRACTITIONER

## 2022-10-31 PROCEDURE — G0511 PR CHRONIC CARE MGMT, RHC OR FQHC ONLY, 20 MINS OR MORE: ICD-10-PCS | Mod: ,,, | Performed by: NURSE PRACTITIONER

## 2022-11-01 RX ORDER — SUMATRIPTAN SUCCINATE 100 MG/1
100 TABLET ORAL ONCE
Qty: 12 TABLET | Refills: 5 | Status: SHIPPED | OUTPATIENT
Start: 2022-11-01 | End: 2022-11-01

## 2022-11-14 ENCOUNTER — TELEPHONE (OUTPATIENT)
Dept: FAMILY MEDICINE | Facility: CLINIC | Age: 67
End: 2022-11-14
Payer: MEDICARE

## 2022-11-14 NOTE — TELEPHONE ENCOUNTER
Patient requested refill on levothyroxine through portal. I sent it to [facility], she refused refill. I called the patient to let her know she would need to schedule an appointment to [facility]. She said that she would call back to schedule at a later date.

## 2022-11-21 ENCOUNTER — TELEPHONE (OUTPATIENT)
Dept: FAMILY MEDICINE | Facility: CLINIC | Age: 67
End: 2022-11-21
Payer: MEDICARE

## 2022-11-21 DIAGNOSIS — I10 ESSENTIAL (PRIMARY) HYPERTENSION: Primary | Chronic | ICD-10-CM

## 2022-11-21 RX ORDER — LOSARTAN POTASSIUM AND HYDROCHLOROTHIAZIDE 25; 100 MG/1; MG/1
1 TABLET ORAL DAILY
Qty: 90 TABLET | Refills: 1 | Status: SHIPPED | OUTPATIENT
Start: 2022-11-21 | End: 2023-05-14

## 2022-11-21 NOTE — TELEPHONE ENCOUNTER
Elizabeth Altman, RN  NELI Badillo  Phone Number: [phone]            Previous Messages     ----- Message -----   From: Stefania Paige   Sent: 11/18/2022  10:55 AM CST   To: Richard POPE Staff     Pt has had high blood pressures in the 170's/ 98's. 184/102. Pt on her own started taking HCTZ 25mg twice a day instead of once per day . Her blood pressure has improved to 140's/90's .Pt is aware that is not the correct way to do things and CC educated that the pharmacy will not fill the medication and it will run out.  CC educated that that medication can affect kidney function and the doctor should be aware of medications and she may need blood test. I am happy to help please let me know if there is anything I can do.      Taking hctz 2x/day is definitely not what I would have recommended had she called for advice.  Her GFR was already decreased w/ very mild creatinine elevation on labs in June.  I recommend adding losartan w/ hctz 25 mg once dly.  I will send rx the 2 meds will be together in a combo pill.  Keep appt 12/7/22.    She was supposed to come by over a month ago to have thyroid function checked (see phone enc 10/11/22).   At this point, she might as well wait until her appt or I can enter additional fasting labs & she can have them all done prior to the appt.

## 2022-11-22 NOTE — TELEPHONE ENCOUNTER
Patient stated she picked up the losartan/HCTZ yesterday and started medication this morning.  Instructed patient to keep Dec. 7 appointment and bring blood pressure log with her.  She voiced understanding.

## 2022-11-30 ENCOUNTER — EXTERNAL CHRONIC CARE MANAGEMENT (OUTPATIENT)
Dept: FAMILY MEDICINE | Facility: CLINIC | Age: 67
End: 2022-11-30
Payer: MEDICARE

## 2022-11-30 PROCEDURE — G0511 PR CHRONIC CARE MGMT, RHC OR FQHC ONLY, 20 MINS OR MORE: ICD-10-PCS | Mod: ,,, | Performed by: NURSE PRACTITIONER

## 2022-11-30 PROCEDURE — G0511 CCM/BHI BY RHC/FQHC 20MIN MO: HCPCS | Mod: ,,, | Performed by: NURSE PRACTITIONER

## 2022-12-07 ENCOUNTER — OFFICE VISIT (OUTPATIENT)
Dept: FAMILY MEDICINE | Facility: CLINIC | Age: 67
End: 2022-12-07
Payer: MEDICARE

## 2022-12-07 VITALS
WEIGHT: 239 LBS | HEART RATE: 68 BPM | HEIGHT: 67 IN | OXYGEN SATURATION: 98 % | TEMPERATURE: 99 F | RESPIRATION RATE: 18 BRPM | BODY MASS INDEX: 37.51 KG/M2 | DIASTOLIC BLOOD PRESSURE: 80 MMHG | SYSTOLIC BLOOD PRESSURE: 126 MMHG

## 2022-12-07 DIAGNOSIS — I10 ESSENTIAL (PRIMARY) HYPERTENSION: Primary | Chronic | ICD-10-CM

## 2022-12-07 DIAGNOSIS — E78.2 MIXED HYPERLIPIDEMIA: Primary | ICD-10-CM

## 2022-12-07 DIAGNOSIS — Z23 NEED FOR PNEUMOCOCCAL VACCINE: ICD-10-CM

## 2022-12-07 DIAGNOSIS — E03.9 HYPOTHYROIDISM (ACQUIRED): Chronic | ICD-10-CM

## 2022-12-07 DIAGNOSIS — E78.2 MIXED HYPERLIPIDEMIA: Chronic | ICD-10-CM

## 2022-12-07 DIAGNOSIS — Z79.899 ENCOUNTER FOR LONG-TERM (CURRENT) USE OF OTHER MEDICATIONS: ICD-10-CM

## 2022-12-07 DIAGNOSIS — E66.9 OBESITY, CLASS II, BMI 35-39.9: Chronic | ICD-10-CM

## 2022-12-07 LAB
ALBUMIN SERPL BCP-MCNC: 3.9 G/DL (ref 3.5–5)
ALBUMIN/GLOB SERPL: 1.3 {RATIO}
ALP SERPL-CCNC: 112 U/L (ref 55–142)
ALT SERPL W P-5'-P-CCNC: 41 U/L (ref 13–56)
ANION GAP SERPL CALCULATED.3IONS-SCNC: 10 MMOL/L (ref 7–16)
AST SERPL W P-5'-P-CCNC: 24 U/L (ref 15–37)
BILIRUB SERPL-MCNC: 0.7 MG/DL (ref ?–1.2)
BUN SERPL-MCNC: 15 MG/DL (ref 7–18)
BUN/CREAT SERPL: 15 (ref 6–20)
CALCIUM SERPL-MCNC: 9.1 MG/DL (ref 8.5–10.1)
CHLORIDE SERPL-SCNC: 104 MMOL/L (ref 98–107)
CHOLEST SERPL-MCNC: 138 MG/DL (ref 0–200)
CHOLEST/HDLC SERPL: 3.1 {RATIO}
CO2 SERPL-SCNC: 28 MMOL/L (ref 21–32)
CREAT SERPL-MCNC: 0.98 MG/DL (ref 0.55–1.02)
EGFR (NO RACE VARIABLE) (RUSH/TITUS): 63 ML/MIN/1.73M²
GLOBULIN SER-MCNC: 3.1 G/DL (ref 2–4)
GLUCOSE SERPL-MCNC: 99 MG/DL (ref 74–106)
HDLC SERPL-MCNC: 44 MG/DL (ref 40–60)
LDLC SERPL CALC-MCNC: 61 MG/DL
LDLC/HDLC SERPL: 1.4 {RATIO}
NONHDLC SERPL-MCNC: 94 MG/DL
POTASSIUM SERPL-SCNC: 3.8 MMOL/L (ref 3.5–5.1)
PROT SERPL-MCNC: 7 G/DL (ref 6.4–8.2)
SODIUM SERPL-SCNC: 138 MMOL/L (ref 136–145)
T3FREE SERPL-MCNC: 2.92 PG/ML (ref 2.18–3.98)
T4 FREE SERPL-MCNC: 1.07 NG/DL (ref 0.76–1.46)
TRIGL SERPL-MCNC: 167 MG/DL (ref 35–150)
TSH SERPL DL<=0.005 MIU/L-ACNC: 2.24 UIU/ML (ref 0.36–3.74)
VLDLC SERPL-MCNC: 33 MG/DL

## 2022-12-07 PROCEDURE — 84443 TSH: ICD-10-PCS | Mod: ,,, | Performed by: CLINICAL MEDICAL LABORATORY

## 2022-12-07 PROCEDURE — 84439 T4, FREE: ICD-10-PCS | Mod: ,,, | Performed by: CLINICAL MEDICAL LABORATORY

## 2022-12-07 PROCEDURE — 84481 T3, FREE: ICD-10-PCS | Mod: ,,, | Performed by: CLINICAL MEDICAL LABORATORY

## 2022-12-07 PROCEDURE — G0009 ADMIN PNEUMOCOCCAL VACCINE: HCPCS | Mod: ,,, | Performed by: NURSE PRACTITIONER

## 2022-12-07 PROCEDURE — 84443 ASSAY THYROID STIM HORMONE: CPT | Mod: ,,, | Performed by: CLINICAL MEDICAL LABORATORY

## 2022-12-07 PROCEDURE — 80053 COMPREHEN METABOLIC PANEL: CPT | Mod: ,,, | Performed by: CLINICAL MEDICAL LABORATORY

## 2022-12-07 PROCEDURE — 90677 PCV20 VACCINE IM: CPT | Mod: ,,, | Performed by: NURSE PRACTITIONER

## 2022-12-07 PROCEDURE — G0009 PNEUMOCOCCAL CONJUGATE VACCINE 20-VALENT: ICD-10-PCS | Mod: ,,, | Performed by: NURSE PRACTITIONER

## 2022-12-07 PROCEDURE — 99214 PR OFFICE/OUTPT VISIT, EST, LEVL IV, 30-39 MIN: ICD-10-PCS | Mod: ,,, | Performed by: NURSE PRACTITIONER

## 2022-12-07 PROCEDURE — 80061 LIPID PANEL: ICD-10-PCS | Mod: ,,, | Performed by: CLINICAL MEDICAL LABORATORY

## 2022-12-07 PROCEDURE — 84439 ASSAY OF FREE THYROXINE: CPT | Mod: ,,, | Performed by: CLINICAL MEDICAL LABORATORY

## 2022-12-07 PROCEDURE — 99214 OFFICE O/P EST MOD 30 MIN: CPT | Mod: ,,, | Performed by: NURSE PRACTITIONER

## 2022-12-07 PROCEDURE — 90677 PNEUMOCOCCAL CONJUGATE VACCINE 20-VALENT: ICD-10-PCS | Mod: ,,, | Performed by: NURSE PRACTITIONER

## 2022-12-07 PROCEDURE — 80061 LIPID PANEL: CPT | Mod: ,,, | Performed by: CLINICAL MEDICAL LABORATORY

## 2022-12-07 PROCEDURE — 80053 COMPREHENSIVE METABOLIC PANEL: ICD-10-PCS | Mod: ,,, | Performed by: CLINICAL MEDICAL LABORATORY

## 2022-12-07 PROCEDURE — 84481 FREE ASSAY (FT-3): CPT | Mod: ,,, | Performed by: CLINICAL MEDICAL LABORATORY

## 2022-12-07 RX ORDER — SUMATRIPTAN SUCCINATE 100 MG/1
100 TABLET ORAL
COMMUNITY
End: 2023-01-26 | Stop reason: SDUPTHER

## 2022-12-07 NOTE — PROGRESS NOTES
[facility] - FAMILY MEDICINE       PATIENT NAME: Dominic Casas   : 1955    AGE: 67 y.o. DATE OF ENCOUNTER: 22    MRN: [id number]      PCP: NELI Badillo    Reason for Visit / Chief Complaint:  Hypertension, Hyperlipidemia, and Follow-up (Patient presents to clinic for 6 month follow up of HTN, HLD with labs)     Subjective:     HPI:    Presents for f/u uncontrolled HTN, HLD, chronic migraines, & hypothyroidism.       received msg pt had self-adjusted hctz d/t uncontrolled HTN - added losartan; hx mild creatinine elevation w/ decreased GFR 2022  Reports had been using improper technique which she will adjust & continue monitoring.     Walking more, watching what she eats.  Was on Noom x 8 mths & lost no weight.    Leg length discrepancy, wears a lift in left shoe - will contact Dr. Jacobs about this.    Review of Systems:     Review of Systems   Constitutional:  Negative for activity change and unexpected weight change.   HENT:  Negative for hearing loss, rhinorrhea and trouble swallowing.    Eyes:  Negative for discharge and visual disturbance.   Respiratory: Negative.  Negative for chest tightness, shortness of breath and wheezing.    Cardiovascular: Negative.  Negative for chest pain and palpitations.   Gastrointestinal:  Negative for blood in stool, constipation, diarrhea and vomiting.   Endocrine: Negative for polydipsia and polyuria.   Genitourinary:  Negative for difficulty urinating, dysuria, hematuria and menstrual problem.   Musculoskeletal:  Positive for arthralgias. Negative for joint swelling and neck pain.   Skin: Negative.    Neurological: Negative.  Negative for weakness and headaches.        Chronic migraines - has been doing well lately   Psychiatric/Behavioral:  Negative for confusion and dysphoric mood.      Allergies:     Review of patient's allergies indicates:   Allergen Reactions    Codeine Hives and Itching        Labs:     Lipids:   Lab  Results   Component Value Date    CHOL 137 06/06/2022     Lab Results   Component Value Date    HDL 48 06/06/2022     Lab Results   Component Value Date    LDLCALC 67 06/06/2022     Lab Results   Component Value Date    TRIG 109 06/06/2022     CMP:  Sodium   Date Value Ref Range Status   06/06/2022 141 136 - 145 mmol/L Final     Potassium   Date Value Ref Range Status   06/06/2022 4.3 3.5 - 5.1 mmol/L Final     Chloride   Date Value Ref Range Status   06/06/2022 104 98 - 107 mmol/L Final     Glucose   Date Value Ref Range Status   06/06/2022 91 74 - 106 mg/dL Final     BUN   Date Value Ref Range Status   06/06/2022 14 7 - 18 mg/dL Final     Creatinine   Date Value Ref Range Status   06/06/2022 1.04 (H) 0.55 - 1.02 mg/dL Final     AST   Date Value Ref Range Status   06/06/2022 26 15 - 37 U/L Final     ALT   Date Value Ref Range Status   06/06/2022 39 13 - 56 U/L Final     eGFR   Date Value Ref Range Status   06/06/2022 56 (L) >=60 mL/min/1.73m² Final      CBC:  Lab Results   Component Value Date    WBC 6.28 06/06/2022    RBC 4.74 06/06/2022    HGB 13.0 06/06/2022    HCT 41.0 06/06/2022     06/06/2022      TSH:  Lab Results   Component Value Date    TSH 2.830 12/01/2021       Medical History:     Past Medical History:   Diagnosis Date    Cholelithiasis     4/26/21 per US (report scanned in); HIDA scan 5/7/21 normal w/ EF49% (scanned in)    Chronic migraine without aura or status migrainosus     Hyperlipidemia     Hypertension     Hypothyroidism     Lumbar degenerative disc disease     Osteoarthritis of multiple joints     Spondylosis of cervical spine     Spondylosis, lumbosacral       Social History     Tobacco Use   Smoking Status Never   Smokeless Tobacco Never      Past Surgical History:   Procedure Laterality Date    PARTIAL KNEE ARTHROPLASTY Right 06/2017    Dr. Jacobs    SHOULDER ARTHROSCOPY Left 07/2012    left rotator cuff repair    TOTAL HIP ARTHROPLASTY Left 09/2017    TOTAL HIP ARTHROPLASTY Right  "10/07/2019    [place]      Objective:      Wt Readings from Last 3 Encounters:   12/07/22 0828 108.4 kg (239 lb)   06/06/22 0738 104.1 kg (229 lb 6.4 oz)   05/03/22 1227 100.7 kg (222 lb)     Vitals:    12/07/22 0828   BP: 126/80   BP Location: Left arm   Patient Position: Sitting   BP Method: Large (Manual)   Pulse: 68   Resp: 18   Temp: 98.5 °F (36.9 °C)   TempSrc: Oral   SpO2: 98%   Weight: 108.4 kg (239 lb)   Height: 5' 7" (1.702 m)     Body mass index is 37.43 kg/m².     Physical Exam:    Physical Exam  Vitals and nursing note reviewed.   Constitutional:       General: She is not in acute distress.     Appearance: Normal appearance.   HENT:      Head: Normocephalic.      Right Ear: Tympanic membrane, ear canal and external ear normal.      Left Ear: Tympanic membrane, ear canal and external ear normal.      Nose: Nose normal.      Mouth/Throat:      Mouth: Mucous membranes are moist.      Pharynx: Oropharynx is clear.   Eyes:      Conjunctiva/sclera: Conjunctivae normal.      Pupils: Pupils are equal, round, and reactive to light.   Neck:      Thyroid: No thyroid mass or thyromegaly.      Vascular: Normal carotid pulses. No carotid bruit.   Cardiovascular:      Rate and Rhythm: Normal rate and regular rhythm.      Pulses: Normal pulses.      Heart sounds: Normal heart sounds.   Pulmonary:      Effort: Pulmonary effort is normal.      Breath sounds: Normal breath sounds.   Abdominal:      Palpations: Abdomen is soft.      Tenderness: There is no abdominal tenderness.   Musculoskeletal:      Cervical back: Neck supple.      Right lower leg: No edema.      Left lower leg: No edema.   Lymphadenopathy:      Cervical: No cervical adenopathy.   Skin:     General: Skin is warm and dry.   Neurological:      General: No focal deficit present.      Mental Status: She is alert and oriented to person, place, and time.   Psychiatric:         Mood and Affect: Mood normal.         Behavior: Behavior normal.    "     Assessment:          ICD-10-CM ICD-9-CM   1. Essential (primary) hypertension  I10 401.9   2. Mixed hyperlipidemia  E78.2 272.2   3. Hypothyroidism (acquired)  E03.9 244.9   4. Obesity, Class II, BMI 35-39.9  E66.9 278.00   5. Need for pneumococcal vaccine  Z23 V03.82   6. Encounter for long-term (current) use of other medications  Z79.899 V58.69        Plan:       Essential (primary) hypertension    Mixed hyperlipidemia  -     Comprehensive Metabolic Panel  -     Lipid Panel    Hypothyroidism (acquired)  -     T4, Free  -     TSH  -     T3, Free    Obesity, Class II, BMI 35-39.9    Need for pneumococcal vaccine  -     Pneumococcal Conjugate Vaccine (20 Valent) (IM)    Encounter for long-term (current) use of other medications  -     Comprehensive Metabolic Panel      Current Outpatient Medications:     atorvastatin (LIPITOR) 20 MG tablet, Take 1 tablet (20 mg total) by mouth once daily., Disp: 90 tablet, Rfl: 3    levothyroxine (SYNTHROID) 88 MCG tablet, Take 1 tablet (88 mcg total) by mouth once daily., Disp: 90 tablet, Rfl: 1    losartan-hydrochlorothiazide 100-25 mg (HYZAAR) 100-25 mg per tablet, Take 1 tablet by mouth once daily., Disp: 90 tablet, Rfl: 1    promethazine (PHENERGAN) 25 MG tablet, Take 2 tablets (50 mg total) by mouth every 4 (four) hours as needed for Nausea. 1-2 tablet every 4 hours as needed, Disp: 30 tablet, Rfl: 1    sumatriptan (IMITREX) 100 MG tablet, Take 100 mg by mouth every 2 (two) hours as needed for Migraine., Disp: , Rfl:     SUMAtriptan succinate 6 mg/0.5 mL Crtg, Inject into the skin., Disp: , Rfl:     New & refilled meds:  Requested Prescriptions      No prescriptions requested or ordered in this encounter     Reviewed proper BP monitoring technique.  Continue home monitoring.  Continue current meds.  Will refill levothyroxine after lab review in case dosage adjustment is needed.  Labs today as ordered - will notify when reviewed.  Reviewed diet, exercise and weight  control.  Prevnar-20 today  TATY for July 2022 eye exam report from [facility]    Return to clinic 6 mths for HTN & hypothyroidism; and f/u as needed.    Future Appointments   Date Time Provider Department Center   1/5/2023  1:00 PM AWBILLY NURSE, CHRISTINE [facility] FAMILY MEDICINE [facility] STE[facility]ANIE [place]   6/8/2023  9:00 AM NELI Badillo [facility] STE[facility] [place]   7/12/2023 10:15 AM [facility] Christine ARORA Leola        Signature:  NELI Badillo

## 2022-12-11 DIAGNOSIS — E03.9 HYPOTHYROIDISM (ACQUIRED): ICD-10-CM

## 2022-12-12 DIAGNOSIS — E03.9 HYPOTHYROIDISM (ACQUIRED): ICD-10-CM

## 2022-12-12 RX ORDER — LEVOTHYROXINE SODIUM 88 UG/1
88 TABLET ORAL DAILY
Qty: 90 TABLET | Refills: 3 | Status: SHIPPED | OUTPATIENT
Start: 2022-12-12 | End: 2023-11-29

## 2022-12-12 RX ORDER — LEVOTHYROXINE SODIUM 88 UG/1
TABLET ORAL
Qty: 90 TABLET | Refills: 1 | OUTPATIENT
Start: 2022-12-12

## 2022-12-12 NOTE — TELEPHONE ENCOUNTER
Medication refused due to failing protocol.    Requested Prescriptions   Pending Prescriptions Disp Refills    hydroCHLOROthiazide (HYDRODIURIL) 25 MG tablet [Pharmacy Med Name: HYDROCHLOROTHIAZIDE 25MG TABLETS] 15 tablet      Sig: TAKE 1 TABLET(25 MG) BY MOUTH EVERY DAY       Diuretics Protocol Failed - 11/1/2022  3:25 AM        Failed - Serum Potassium between 3.5 to 5.2 on file in the past 12 months     Lab Results   Component Value Date    K 3.8 07/08/2022                  Failed - Serum Creatinine less than 1.4 on file in the past 12 months     Lab Results   Component Value Date    CREATININE 0.58 07/08/2022     Lab Results   Component Value Date    EGFRNONAA 115 07/08/2022                 Failed - Serum Sodium between 130 to 148 on file in the past 12 months     Lab Results   Component Value Date     07/08/2022                  Passed - Visit with authorizing provider in past 12 months or upcoming 90 days         Passed - Blood Pressure below 139/89 on file in past 12 months      BP Readings from Last 3 Encounters:   10/27/22 123/78   07/08/22 134/88   10/20/21 120/77               amLODIPine (NORVASC) 10 MG tablet [Pharmacy Med Name: AMLODIPINE BESYLATE 10MG TABLETS] 15 tablet      Sig: TAKE 1 TABLET(10 MG) BY MOUTH EVERY DAY       Calcium-Channel Blockers Protocol Passed - 11/1/2022  3:25 AM        Passed - Patient is not currently pregnant        Passed - No positive pregnancy test in past 12 months         Passed - Visit with authorizing provider in past 12 months or upcoming 90 days         Passed - Blood Pressure below 139/89 on file in past 12 months      BP Readings from Last 3 Encounters:   10/27/22 123/78   07/08/22 134/88   10/20/21 120/77               potassium chloride (MICRO-K) 10 MEQ CpSR [Pharmacy Med Name: POTASSIUM CL 10MEQ ER CAPSULES] 15 capsule      Sig: TAKE 1 CAPSULE(10 MEQ) BY MOUTH EVERY DAY       There is no refill protocol information for this order       atorvastatin  (LIPITOR) 10 MG tablet [Pharmacy Med Name: ATORVASTATIN 10MG TABLETS] 15 tablet      Sig: TAKE 1 TABLET(10 MG) BY MOUTH EVERY DAY       Hmg CoA Reductase Inhibitors Protocol Failed - 11/1/2022  3:25 AM        Failed - ALT less than 95 in past 12 months      Lab Results   Component Value Date    ALT 23 07/08/2022              Passed - Patient not currently pregnant        Passed - No positive pregnancy test in past 12 months         Passed - Recent or future visit with authorizing provider        Passed - Lipid Panel within past 12 months     Lab Results   Component Value Date    CHOL 142 10/27/2022    HDL 66 (H) 10/27/2022    LDLCALC 66 10/27/2022    TRIG 48 10/27/2022

## 2022-12-22 ENCOUNTER — PATIENT MESSAGE (OUTPATIENT)
Dept: FAMILY MEDICINE | Facility: CLINIC | Age: 67
End: 2022-12-22
Payer: MEDICARE

## 2022-12-30 NOTE — PROGRESS NOTES
[facility]     PATIENT NAME: Dominic Casas   : 1955    AGE: 67 y.o. DATE: 2023   MRN: [id number]        Reason for Visit / Chief Complaint: Medicare AWV (Subsequent [employer] AWV visit )        Dominic Casas presents for a Subsequent  [employer] AWV today.    Review of Systems   Constitutional: Negative.    Respiratory: Negative.     Cardiovascular: Negative.    Musculoskeletal:  Positive for joint pain.      The following components were reviewed and updated:      Medical/Social/Family History:  Past Medical History:   Diagnosis Date    Cholelithiasis     21 per US (report scanned in); HIDA scan 21 normal w/ EF49% (scanned in)    Chronic migraine without aura or status migrainosus     Hyperlipidemia     Hypertension     Hypothyroidism     Lumbar degenerative disc disease     Osteoarthritis of multiple joints     Spondylosis of cervical spine     Spondylosis, lumbosacral         Family History   Problem Relation Age of Onset    Alzheimer's disease Mother     Dementia Mother     Colon cancer Mother     Cancer Father         Waldenstrom's macroglobulinemia    No Known Problems Sister     No Known Problems Maternal Grandmother     Migraines Maternal Grandfather     No Known Problems Paternal Grandmother     No Known Problems Paternal Grandfather     No Known Problems Sister         Past Surgical History:   Procedure Laterality Date    PARTIAL KNEE ARTHROPLASTY Right 2017    Dr. Jacobs    SHOULDER ARTHROSCOPY Left 2012    left rotator cuff repair    TOTAL HIP ARTHROPLASTY Left 2017    TOTAL HIP ARTHROPLASTY Right 10/07/2019    Dr[place]       Social History     Tobacco Use   Smoking Status Never   Smokeless Tobacco Never       Social History     Substance and Sexual Activity   Alcohol Use Not Currently         Allergies and Current Medications     Review of patient's allergies indicates:   Allergen Reactions    Codeine Hives and Itching       Current  Outpatient Medications:     atorvastatin (LIPITOR) 20 MG tablet, Take 1 tablet (20 mg total) by mouth once daily., Disp: 90 tablet, Rfl: 3    levothyroxine (SYNTHROID) 88 MCG tablet, Take 1 tablet (88 mcg total) by mouth once daily., Disp: 90 tablet, Rfl: 3    losartan-hydrochlorothiazide 100-25 mg (HYZAAR) 100-25 mg per tablet, Take 1 tablet by mouth once daily., Disp: 90 tablet, Rfl: 1    promethazine (PHENERGAN) 25 MG tablet, Take 2 tablets (50 mg total) by mouth every 4 (four) hours as needed for Nausea. 1-2 tablet every 4 hours as needed, Disp: 30 tablet, Rfl: 1    sumatriptan (IMITREX) 100 MG tablet, Take 100 mg by mouth every 2 (two) hours as needed for Migraine., Disp: , Rfl:     SUMAtriptan succinate 6 mg/0.5 mL Crtg, Inject into the skin., Disp: , Rfl:       Health Risk Assessment   Fall Risk:  not at risk   Obesity: BMI Body mass index is 37.28 kg/m². Declines IBT program.   Advance Directive: no but information given   Depression: PHQ9- 0   HTN: DASH diet, exercise, weight management, med compliance, home BP monitoring, and follow-up discussed.   T2DM: no  STI: not at rsik   Statin Use: yes      Health Maintenance   Last eye exam: 6/22/22 saw Dr. Schilling   Last CV screen with lipids: 12/7/22   Diabetes screening with fasting glucose or A1c: 12/7/22   Colonoscopy: 6/5/21 Cologuard -negative   Flu Vaccine: 11/9/22   Pneumonia vaccines: 12/7/22   COVID vaccine: (moderna) 10/27/21, 3/1/21, 2/1/21   Hep B vaccine: na   DEXA: 7/1/21   Last pap/pelvic: 4/25/22 Dr. Arrington   Last Mammogram: ordered 7/7/22   AAA screening: na   HIV Screening: not at risk  Hepatitis C Screen: 5/9/21 non-reactive  Low Dose CT Scan: na    Health Maintenance Topics with due status: Not Due       Topic Last Completion Date    TETANUS VACCINE 08/14/2013    Colorectal Cancer Screening 06/02/2021    DEXA Scan 07/01/2021    Pap Smear 04/25/2022    Eye Exam 06/22/2022    Mammogram 07/07/2022    Lipid Panel 12/07/2022     Health Maintenance Due    Topic Date Due    Hemoglobin A1c (Diabetic Prevention Screening)  Never done         Lab results available in Epic or see dates from [facility] above:   Lab Results   Component Value Date    CHOL 138 12/07/2022    CHOL 137 06/06/2022    CHOL 129 05/19/2021     Lab Results   Component Value Date    HDL 44 12/07/2022    HDL 48 06/06/2022    HDL 51 05/19/2021     Lab Results   Component Value Date    LDLCALC 61 12/07/2022    LDLCALC 67 06/06/2022    LDLCALC 54 05/19/2021     Lab Results   Component Value Date    TRIG 167 (H) 12/07/2022    TRIG 109 06/06/2022    TRIG 122 05/19/2021     Lab Results   Component Value Date    CHOLHDL 3.1 12/07/2022    CHOLHDL 2.9 06/06/2022    CHOLHDL 2.5 05/19/2021       No results found for: LABA1C, HGBA1C - ordered for future encounter    Sodium   Date Value Ref Range Status   12/07/2022 138 136 - 145 mmol/L Final     Potassium   Date Value Ref Range Status   12/07/2022 3.8 3.5 - 5.1 mmol/L Final     Chloride   Date Value Ref Range Status   12/07/2022 104 98 - 107 mmol/L Final     CO2   Date Value Ref Range Status   12/07/2022 28 21 - 32 mmol/L Final     Glucose   Date Value Ref Range Status   12/07/2022 99 74 - 106 mg/dL Final     BUN   Date Value Ref Range Status   12/07/2022 15 7 - 18 mg/dL Final     Creatinine   Date Value Ref Range Status   12/07/2022 0.98 0.55 - 1.02 mg/dL Final     Calcium   Date Value Ref Range Status   12/07/2022 9.1 8.5 - 10.1 mg/dL Final     Total Protein   Date Value Ref Range Status   12/07/2022 7.0 6.4 - 8.2 g/dL Final     Albumin   Date Value Ref Range Status   12/07/2022 3.9 3.5 - 5.0 g/dL Final     Bilirubin, Total   Date Value Ref Range Status   12/07/2022 0.7 >0.0 - 1.2 mg/dL Final     Alk Phos   Date Value Ref Range Status   12/07/2022 112 55 - 142 U/L Final     AST   Date Value Ref Range Status   12/07/2022 24 15 - 37 U/L Final     ALT   Date Value Ref Range Status   12/07/2022 41 13 - 56 U/L Final     Anion Gap   Date Value Ref Range Status   12/07/2022  "10 7 - 16 mmol/L Final     eGFR   Date Value Ref Range Status   06/06/2022 56 (L) >=60 mL/min/1.73m² Final         Incontinence  Bowel: no  Bladder: uses briefs occasionally      Care Team  TYLER Ramos FNP -PCP          **See Completed Assessments for Annual Wellness visit within the encounter summary    The following assessments were completed & reviewed:  Depression Screening  Cognitive function Screening  Timed Get Up Test  Whisper Test  Vision Screen  Health Risk Assessment  Checklist of ADLs and IADLs        Objective  Vitals:    01/05/23 1021   BP: 132/80   Pulse: 69   Resp: 16   Temp: 98.8 °F (37.1 °C)   TempSrc: Oral   SpO2: 97%   Weight: 108 kg (238 lb)   Height: 5' 7" (1.702 m)   PainSc: 0-No pain      Body mass index is 37.28 kg/m².  Ideal body weight: 61.6 kg (135 lb 12.9 oz)       Physical Exam  Vitals and nursing note reviewed.   Constitutional:       General: She is not in acute distress.     Appearance: Normal appearance. She is not ill-appearing.   HENT:      Head: Normocephalic.   Eyes:      Conjunctiva/sclera: Conjunctivae normal.   Cardiovascular:      Rate and Rhythm: Normal rate and regular rhythm.      Heart sounds: Normal heart sounds.   Pulmonary:      Effort: Pulmonary effort is normal. No respiratory distress.      Breath sounds: Normal breath sounds.   Musculoskeletal:      Cervical back: Neck supple.   Skin:     General: Skin is warm and dry.   Neurological:      Mental Status: She is alert and oriented to person, place, and time.         Assessment:     1. Encounter for subsequent annual wellness visit (AWV) in Medicare patient    2. Essential (primary) hypertension    3. Hypothyroidism (acquired)    4. Screening for diabetes mellitus  - Hemoglobin A1C; Future    5. BMI 37.0-37.9, adult  - Hemoglobin A1C; Future    6. Encounter for long-term (current) use of other medications  - Hemoglobin A1C; Future    7. Mixed hyperlipidemia         Plan:    Discussed and provided with a screening " schedule and personal prevention plan in accordance with USPSTF age appropriate recommendations and Medicare screening guidelines.   Education, counseling, and referrals were provided as needed.  After Visit Summary printed and given to patient which includes written education and a list of any referrals if indicated.     Education including diet, exercise, falls, preventive health care for older adults and advanced directives discussed with patient and patient verbalized understanding.      F/u plan for yearly AWV.    Signature: Pita QUINTEROS

## 2022-12-31 ENCOUNTER — EXTERNAL CHRONIC CARE MANAGEMENT (OUTPATIENT)
Dept: FAMILY MEDICINE | Facility: CLINIC | Age: 67
End: 2022-12-31
Payer: MEDICARE

## 2022-12-31 PROCEDURE — G0511 PR CHRONIC CARE MGMT, RHC OR FQHC ONLY, 20 MINS OR MORE: ICD-10-PCS | Mod: ,,, | Performed by: NURSE PRACTITIONER

## 2022-12-31 PROCEDURE — G0511 CCM/BHI BY RHC/FQHC 20MIN MO: HCPCS | Mod: ,,, | Performed by: NURSE PRACTITIONER

## 2023-01-05 ENCOUNTER — OFFICE VISIT (OUTPATIENT)
Dept: FAMILY MEDICINE | Facility: CLINIC | Age: 68
End: 2023-01-05
Payer: MEDICARE

## 2023-01-05 VITALS
HEART RATE: 69 BPM | SYSTOLIC BLOOD PRESSURE: 132 MMHG | RESPIRATION RATE: 16 BRPM | DIASTOLIC BLOOD PRESSURE: 80 MMHG | HEIGHT: 67 IN | TEMPERATURE: 99 F | OXYGEN SATURATION: 97 % | WEIGHT: 238 LBS | BODY MASS INDEX: 37.35 KG/M2

## 2023-01-05 DIAGNOSIS — E78.2 MIXED HYPERLIPIDEMIA: Chronic | ICD-10-CM

## 2023-01-05 DIAGNOSIS — Z13.1 SCREENING FOR DIABETES MELLITUS: ICD-10-CM

## 2023-01-05 DIAGNOSIS — Z00.00 ENCOUNTER FOR SUBSEQUENT ANNUAL WELLNESS VISIT (AWV) IN MEDICARE PATIENT: Primary | ICD-10-CM

## 2023-01-05 DIAGNOSIS — Z79.899 ENCOUNTER FOR LONG-TERM (CURRENT) USE OF OTHER MEDICATIONS: ICD-10-CM

## 2023-01-05 DIAGNOSIS — I10 ESSENTIAL (PRIMARY) HYPERTENSION: ICD-10-CM

## 2023-01-05 DIAGNOSIS — E03.9 HYPOTHYROIDISM (ACQUIRED): ICD-10-CM

## 2023-01-05 PROCEDURE — 1126F PR PAIN SEVERITY QUANTIFIED, NO PAIN PRESENT: ICD-10-PCS | Mod: ,,, | Performed by: NURSE PRACTITIONER

## 2023-01-05 PROCEDURE — 3079F DIAST BP 80-89 MM HG: CPT | Mod: ,,, | Performed by: NURSE PRACTITIONER

## 2023-01-05 PROCEDURE — 3075F SYST BP GE 130 - 139MM HG: CPT | Mod: ,,, | Performed by: NURSE PRACTITIONER

## 2023-01-05 PROCEDURE — G0439 PR MEDICARE ANNUAL WELLNESS SUBSEQUENT VISIT: ICD-10-PCS | Mod: ,,, | Performed by: NURSE PRACTITIONER

## 2023-01-05 PROCEDURE — 3075F PR MOST RECENT SYSTOLIC BLOOD PRESS GE 130-139MM HG: ICD-10-PCS | Mod: ,,, | Performed by: NURSE PRACTITIONER

## 2023-01-05 PROCEDURE — 1159F PR MEDICATION LIST DOCUMENTED IN MEDICAL RECORD: ICD-10-PCS | Mod: ,,, | Performed by: NURSE PRACTITIONER

## 2023-01-05 PROCEDURE — 1126F AMNT PAIN NOTED NONE PRSNT: CPT | Mod: ,,, | Performed by: NURSE PRACTITIONER

## 2023-01-05 PROCEDURE — 3079F PR MOST RECENT DIASTOLIC BLOOD PRESSURE 80-89 MM HG: ICD-10-PCS | Mod: ,,, | Performed by: NURSE PRACTITIONER

## 2023-01-05 PROCEDURE — 1101F PT FALLS ASSESS-DOCD LE1/YR: CPT | Mod: ,,, | Performed by: NURSE PRACTITIONER

## 2023-01-05 PROCEDURE — 3288F PR FALLS RISK ASSESSMENT DOCUMENTED: ICD-10-PCS | Mod: ,,, | Performed by: NURSE PRACTITIONER

## 2023-01-05 PROCEDURE — 3288F FALL RISK ASSESSMENT DOCD: CPT | Mod: ,,, | Performed by: NURSE PRACTITIONER

## 2023-01-05 PROCEDURE — 3008F PR BODY MASS INDEX (BMI) DOCUMENTED: ICD-10-PCS | Mod: ,,, | Performed by: NURSE PRACTITIONER

## 2023-01-05 PROCEDURE — 3008F BODY MASS INDEX DOCD: CPT | Mod: ,,, | Performed by: NURSE PRACTITIONER

## 2023-01-05 PROCEDURE — 1159F MED LIST DOCD IN RCRD: CPT | Mod: ,,, | Performed by: NURSE PRACTITIONER

## 2023-01-05 PROCEDURE — G0439 PPPS, SUBSEQ VISIT: HCPCS | Mod: ,,, | Performed by: NURSE PRACTITIONER

## 2023-01-05 PROCEDURE — 1101F PR PT FALLS ASSESS DOC 0-1 FALLS W/OUT INJ PAST YR: ICD-10-PCS | Mod: ,,, | Performed by: NURSE PRACTITIONER

## 2023-01-05 PROCEDURE — 1160F PR REVIEW ALL MEDS BY PRESCRIBER/CLIN PHARMACIST DOCUMENTED: ICD-10-PCS | Mod: ,,, | Performed by: NURSE PRACTITIONER

## 2023-01-05 PROCEDURE — 1160F RVW MEDS BY RX/DR IN RCRD: CPT | Mod: ,,, | Performed by: NURSE PRACTITIONER

## 2023-01-05 NOTE — PATIENT INSTRUCTIONS
Counseling and Referral of Other Preventative  (Italic type indicates deductible and co-insurance are waived)    Patient Name: Dominic Casas  Today's Date: 1/5/2023    Health Maintenance         Date Due Completion Date    Hemoglobin A1c (Diabetic Prevention Screening) Never done ---ordered for future visit    COVID-19 Vaccine (4 - Booster for Moderna series) 12/07/2023 (Originally 12/22/2021) 10/27/2021    Eye Exam 06/22/2023 6/22/2022 (Done)    Override on 6/22/2022: Done (Brandon Schilling  [facility])    Override on 6/28/2021: Done ([facility])    Mammogram 07/07/2023 7/7/2022    Override on 2/15/2016: Done ([facility]- Negative. Repeat yearly. Scanned into documents.)    TETANUS VACCINE 08/14/2023 8/14/2013    Lipid Panel 12/07/2023 12/7/2022    Override on 11/19/2020: Done    Colorectal Cancer Screening 06/02/2024 6/2/2021 (Done)    Override on 6/2/2021: Done (Negative. Repeat 3 years. Scanned into docments.)    DEXA Scan 07/01/2024 7/1/2021    Override on 12/29/2010: Done    Pap Smear 04/25/2025 4/25/2022    Override on 8/2/2016: Done (Dr. Arrington- Negative. Scanned into documents.)          No orders of the defined types were placed in this encounter.

## 2023-01-26 ENCOUNTER — TELEPHONE (OUTPATIENT)
Dept: FAMILY MEDICINE | Facility: CLINIC | Age: 68
End: 2023-01-26
Payer: MEDICARE

## 2023-01-26 DIAGNOSIS — G43.709 CHRONIC MIGRAINE WITHOUT AURA WITHOUT STATUS MIGRAINOSUS, NOT INTRACTABLE: Primary | Chronic | ICD-10-CM

## 2023-01-26 RX ORDER — SUMATRIPTAN SUCCINATE 100 MG/1
100 TABLET ORAL
Qty: 27 TABLET | Refills: 3 | Status: SHIPPED | OUTPATIENT
Start: 2023-01-26 | End: 2023-10-15

## 2023-01-26 NOTE — TELEPHONE ENCOUNTER
Patient states she will go with the 9 Imitrex and if they don't help then she will go to neurology

## 2023-01-26 NOTE — TELEPHONE ENCOUNTER
I received a note from the patient that [employer] will only cover 9 Imitrex per 30 days rather than 12.  She is concerned this will not be enough and was asking for something additional.  I do not think [employer] would pay for a 2nd Triptan and it is likely that Ubrelvy being a new medication would not be covered, so I am not sure what would be the best option at this point.  If she does find that 9 per mth is not enough and using Phenergan as needed in times that headache comes on mildly to help stop it, then I could refer her to neuro to see if they can get her on 1 of the newer controller meds such as Ajovy or Qulipta.

## 2023-01-31 ENCOUNTER — EXTERNAL CHRONIC CARE MANAGEMENT (OUTPATIENT)
Dept: FAMILY MEDICINE | Facility: CLINIC | Age: 68
End: 2023-01-31
Payer: MEDICARE

## 2023-01-31 PROCEDURE — G0511 CCM/BHI BY RHC/FQHC 20MIN MO: HCPCS | Mod: ,,, | Performed by: NURSE PRACTITIONER

## 2023-01-31 PROCEDURE — G0511 PR CHRONIC CARE MGMT, RHC OR FQHC ONLY, 20 MINS OR MORE: ICD-10-PCS | Mod: ,,, | Performed by: NURSE PRACTITIONER

## 2023-02-06 ENCOUNTER — OFFICE VISIT (OUTPATIENT)
Dept: FAMILY MEDICINE | Facility: CLINIC | Age: 68
End: 2023-02-06
Payer: MEDICARE

## 2023-02-06 VITALS
TEMPERATURE: 98 F | BODY MASS INDEX: 37.35 KG/M2 | HEART RATE: 79 BPM | RESPIRATION RATE: 18 BRPM | WEIGHT: 238 LBS | SYSTOLIC BLOOD PRESSURE: 120 MMHG | OXYGEN SATURATION: 97 % | DIASTOLIC BLOOD PRESSURE: 78 MMHG | HEIGHT: 67 IN

## 2023-02-06 DIAGNOSIS — J06.9 VIRAL UPPER RESPIRATORY TRACT INFECTION WITH COUGH: Primary | ICD-10-CM

## 2023-02-06 PROCEDURE — 1159F PR MEDICATION LIST DOCUMENTED IN MEDICAL RECORD: ICD-10-PCS | Mod: ,,, | Performed by: NURSE PRACTITIONER

## 2023-02-06 PROCEDURE — 3078F DIAST BP <80 MM HG: CPT | Mod: ,,, | Performed by: NURSE PRACTITIONER

## 2023-02-06 PROCEDURE — 3074F PR MOST RECENT SYSTOLIC BLOOD PRESSURE < 130 MM HG: ICD-10-PCS | Mod: ,,, | Performed by: NURSE PRACTITIONER

## 2023-02-06 PROCEDURE — 3008F BODY MASS INDEX DOCD: CPT | Mod: ,,, | Performed by: NURSE PRACTITIONER

## 2023-02-06 PROCEDURE — 1101F PT FALLS ASSESS-DOCD LE1/YR: CPT | Mod: ,,, | Performed by: NURSE PRACTITIONER

## 2023-02-06 PROCEDURE — 99212 PR OFFICE/OUTPT VISIT, EST, LEVL II, 10-19 MIN: ICD-10-PCS | Mod: ,,, | Performed by: NURSE PRACTITIONER

## 2023-02-06 PROCEDURE — 3078F PR MOST RECENT DIASTOLIC BLOOD PRESSURE < 80 MM HG: ICD-10-PCS | Mod: ,,, | Performed by: NURSE PRACTITIONER

## 2023-02-06 PROCEDURE — 3288F FALL RISK ASSESSMENT DOCD: CPT | Mod: ,,, | Performed by: NURSE PRACTITIONER

## 2023-02-06 PROCEDURE — 3074F SYST BP LT 130 MM HG: CPT | Mod: ,,, | Performed by: NURSE PRACTITIONER

## 2023-02-06 PROCEDURE — 1160F RVW MEDS BY RX/DR IN RCRD: CPT | Mod: ,,, | Performed by: NURSE PRACTITIONER

## 2023-02-06 PROCEDURE — 99212 OFFICE O/P EST SF 10 MIN: CPT | Mod: ,,, | Performed by: NURSE PRACTITIONER

## 2023-02-06 PROCEDURE — 3288F PR FALLS RISK ASSESSMENT DOCUMENTED: ICD-10-PCS | Mod: ,,, | Performed by: NURSE PRACTITIONER

## 2023-02-06 PROCEDURE — 3008F PR BODY MASS INDEX (BMI) DOCUMENTED: ICD-10-PCS | Mod: ,,, | Performed by: NURSE PRACTITIONER

## 2023-02-06 PROCEDURE — 1101F PR PT FALLS ASSESS DOC 0-1 FALLS W/OUT INJ PAST YR: ICD-10-PCS | Mod: ,,, | Performed by: NURSE PRACTITIONER

## 2023-02-06 PROCEDURE — 1159F MED LIST DOCD IN RCRD: CPT | Mod: ,,, | Performed by: NURSE PRACTITIONER

## 2023-02-06 PROCEDURE — 1160F PR REVIEW ALL MEDS BY PRESCRIBER/CLIN PHARMACIST DOCUMENTED: ICD-10-PCS | Mod: ,,, | Performed by: NURSE PRACTITIONER

## 2023-02-06 NOTE — PROGRESS NOTES
[facility] - FAMILY MEDICINE       PATIENT NAME: Dominic Casas   : 1955    AGE: 67 y.o. DATE OF ENCOUNTER: 23   MRN: [id number]      Visit type: WALK-IN  PCP:  NELI Badillo    Reason for Visit / Chief Complaint: URI (Patient presents to clinic with complaints of URI since last  (sore throat,fatigue, cough, runny nose))     Subjective:     Presents c/o upper resp s/s x 8 days. Felt worse Sat & Sun and a little better today.  Clear drainage.    Review of Systems:     Review of Systems   Constitutional:  Positive for fatigue. Negative for chills and fever.   HENT:  Positive for congestion, postnasal drip, rhinorrhea, sinus pain and sore throat. Negative for ear pain.    Respiratory:  Positive for cough. Negative for shortness of breath and wheezing.    Cardiovascular:  Negative for chest pain.   Gastrointestinal:  Negative for abdominal pain, diarrhea, nausea and vomiting.   Musculoskeletal:  Positive for myalgias.   Skin: Negative.    Neurological:  Positive for headaches.     Allergies and Meds:     Review of patient's allergies indicates:   Allergen Reactions    Codeine Hives and Itching        Current Outpatient Medications:     atorvastatin (LIPITOR) 20 MG tablet, Take 1 tablet (20 mg total) by mouth once daily., Disp: 90 tablet, Rfl: 3    levothyroxine (SYNTHROID) 88 MCG tablet, Take 1 tablet (88 mcg total) by mouth once daily., Disp: 90 tablet, Rfl: 3    losartan-hydrochlorothiazide 100-25 mg (HYZAAR) 100-25 mg per tablet, Take 1 tablet by mouth once daily., Disp: 90 tablet, Rfl: 1    promethazine (PHENERGAN) 25 MG tablet, Take 2 tablets (50 mg total) by mouth every 4 (four) hours as needed for Nausea. 1-2 tablet every 4 hours as needed, Disp: 30 tablet, Rfl: 1    sumatriptan (IMITREX) 100 MG tablet, Take 1 tablet (100 mg total) by mouth every 2 (two) hours as needed for Migraine. May repeat dose in 2 hrs once in 24 hours.  Max dosage 200 mg in 24 hours., Disp: 27 tablet,  "Rfl: 3    SUMAtriptan succinate 6 mg/0.5 mL Crtg, Inject into the skin., Disp: , Rfl:     Objective:     Wt Readings from Last 3 Encounters:   02/06/23 1329 108 kg (238 lb)   01/05/23 1021 108 kg (238 lb)   12/07/22 0828 108.4 kg (239 lb)       /78 (BP Location: Left arm, Patient Position: Sitting, BP Method: Large (Manual))   Pulse 79   Temp 97.5 °F (36.4 °C) (Oral)   Resp 18   Ht 5' 7" (1.702 m)   Wt 108 kg (238 lb)   SpO2 97%   BMI 37.28 kg/m²   Body mass index is 37.28 kg/m².     Physical Exam:    Physical Exam  Vitals and nursing note reviewed.   Constitutional:       General: She is not in acute distress.     Appearance: Normal appearance.   HENT:      Head: Normocephalic.      Right Ear: Hearing, tympanic membrane, ear canal and external ear normal.      Left Ear: Hearing, tympanic membrane, ear canal and external ear normal.      Nose: Congestion present. No rhinorrhea.      Right Turbinates: Swollen.      Left Turbinates: Swollen.      Right Sinus: Maxillary sinus tenderness present. No frontal sinus tenderness.      Left Sinus: Maxillary sinus tenderness present. No frontal sinus tenderness.      Mouth/Throat:      Lips: Pink.      Mouth: Mucous membranes are moist.      Tongue: No lesions.      Pharynx: Uvula midline. No pharyngeal swelling, oropharyngeal exudate, posterior oropharyngeal erythema (injected, PND) or uvula swelling.   Eyes:      Conjunctiva/sclera: Conjunctivae normal.      Pupils: Pupils are equal, round, and reactive to light.   Cardiovascular:      Rate and Rhythm: Normal rate and regular rhythm.      Heart sounds: Normal heart sounds.   Pulmonary:      Effort: Pulmonary effort is normal. No respiratory distress.      Breath sounds: Normal breath sounds. No wheezing, rhonchi or rales.   Musculoskeletal:      Cervical back: No rigidity.   Lymphadenopathy:      Cervical: No cervical adenopathy.   Skin:     General: Skin is warm and dry.   Neurological:      General: No focal " deficit present.      Mental Status: She is alert and oriented to person, place, and time.       Assessment and Plan:        1. Viral upper respiratory tract infection with cough    - Advised more than 200 different viruses can cause URIs including bronchitis & sinusitis.  - Viruses do not respond to antibiotic treatment nor do antibiotics prevent bacterial infections. Discussed the importance of avoiding overuse of antibiotics to prevent antibiotic resistance.  - Symptoms due to viral URI typically last 2 - 14 days, but some symptoms can linger for several weeks. (Most people recover in about 7-10 days.)  - Fever, productive cough, or discolored nasal discharge does not necessarily require antibiotic therapy.   - Treatment of viral infections is supportive.  Rest, increased fluids, and over-the-counter meds to help symptoms.       - Discussed monitoring symptoms and call or return to clinic if there is worsening, concerning, or persistent symptoms that might require additional treatment. Voices understanding.    F/u as needed or if symptoms worsen or persist.  Future Appointments   Date Time Provider Department Center   6/8/2023  9:00 AM NELI Badillo [facility] [place]   7/12/2023 10:15 AM [facility] [place]   1/11/2024 10:00 AM AWV NURSE, [facility] FAMILY MEDICINE [facility] [place]       Signature: Pita QUINTEROS-BC

## 2023-02-06 NOTE — PROGRESS NOTES
[facility] - FAMILY MEDICINE       PATIENT NAME: Dominic Casas   : 1955    AGE: 67 y.o. DATE OF ENCOUNTER: 23   MRN: [id number]      Visit type: WALK-IN  PCP:  NELI Badillo    Reason for Visit / Chief Complaint: URI (Patient presents to clinic with complaints of URI since last  (sore throat,fatigue, cough, runny nose))     Subjective:     Presents c/o ***      Review of Systems:     Review of Systems    Allergies and Meds:     Review of patient's allergies indicates:   Allergen Reactions    Codeine Hives and Itching        Current Outpatient Medications:     atorvastatin (LIPITOR) 20 MG tablet, Take 1 tablet (20 mg total) by mouth once daily., Disp: 90 tablet, Rfl: 3    levothyroxine (SYNTHROID) 88 MCG tablet, Take 1 tablet (88 mcg total) by mouth once daily., Disp: 90 tablet, Rfl: 3    losartan-hydrochlorothiazide 100-25 mg (HYZAAR) 100-25 mg per tablet, Take 1 tablet by mouth once daily., Disp: 90 tablet, Rfl: 1    promethazine (PHENERGAN) 25 MG tablet, Take 2 tablets (50 mg total) by mouth every 4 (four) hours as needed for Nausea. 1-2 tablet every 4 hours as needed, Disp: 30 tablet, Rfl: 1    sumatriptan (IMITREX) 100 MG tablet, Take 1 tablet (100 mg total) by mouth every 2 (two) hours as needed for Migraine. May repeat dose in 2 hrs once in 24 hours.  Max dosage 200 mg in 24 hours., Disp: 27 tablet, Rfl: 3    SUMAtriptan succinate 6 mg/0.5 mL Crtg, Inject into the skin., Disp: , Rfl:     Medical History:     Past Medical History:   Diagnosis Date    Cholelithiasis     21 per US (report scanned in); HIDA scan 21 normal w/ EF49% (scanned in)    Chronic migraine without aura or status migrainosus     Hyperlipidemia     Hypertension     Hypothyroidism     Lumbar degenerative disc disease     Osteoarthritis of multiple joints     Spondylosis of cervical spine     Spondylosis, lumbosacral       Social History     Tobacco Use   Smoking Status Never   Smokeless Tobacco  "Never      Past Surgical History:   Procedure Laterality Date    PARTIAL KNEE ARTHROPLASTY Right 06/2017    Dr. Jacobs    SHOULDER ARTHROSCOPY Left 07/2012    left rotator cuff repair    TOTAL HIP ARTHROPLASTY Left 09/2017    TOTAL HIP ARTHROPLASTY Right 10/07/2019    Dr. [place]      Immunization History   Administered Date(s) Administered    COVID-19 MRNA, LN-S PF (MODERNA HALF 0.25 ML DOSE) 10/27/2021    COVID-19, MRNA, LN-S, PF (MODERNA FULL 0.5 ML DOSE) 02/01/2021, 03/01/2021    Influenza 10/01/2020, 11/11/2021, 11/09/2022    Pneumococcal Conjugate - 20 Valent 12/07/2022    Pneumococcal Polysaccharide - 23 Valent 10/30/2019    Tdap 08/14/2013    Zoster 06/21/2021    Zoster Recombinant 10/30/2019, 12/02/2021        Objective:     Wt Readings from Last 3 Encounters:   02/06/23 1329 108 kg (238 lb)   01/05/23 1021 108 kg (238 lb)   12/07/22 0828 108.4 kg (239 lb)       /78 (BP Location: Left arm, Patient Position: Sitting, BP Method: Large (Manual))   Pulse 79   Temp 97.5 °F (36.4 °C) (Oral)   Resp 18   Ht 5' 7" (1.702 m)   Wt 108 kg (238 lb)   SpO2 97%   BMI 37.28 kg/m²   Body mass index is 37.28 kg/m².     Physical Exam:    Physical Exam    Assessment and Plan:        ***    F/u as needed or if symptoms worsen or persist.  Future Appointments   Date Time Provider Department Center   6/8/2023  9:00 AM NELI Badillo [facility] [place]   7/12/2023 10:15 AM [facility] [place] [facility] [place]   1/11/2024 10:00 AM AWV NURSE, [facility] FAMILY MEDICINE [facility] [place]       Signature: Pita Ramos TOSHIA-BC    "

## 2023-02-28 ENCOUNTER — EXTERNAL CHRONIC CARE MANAGEMENT (OUTPATIENT)
Dept: FAMILY MEDICINE | Facility: CLINIC | Age: 68
End: 2023-02-28
Payer: MEDICARE

## 2023-02-28 PROCEDURE — G0511 CCM/BHI BY RHC/FQHC 20MIN MO: HCPCS | Mod: ,,, | Performed by: NURSE PRACTITIONER

## 2023-02-28 PROCEDURE — G0511 PR CHRONIC CARE MGMT, RHC OR FQHC ONLY, 20 MINS OR MORE: ICD-10-PCS | Mod: ,,, | Performed by: NURSE PRACTITIONER

## 2023-03-31 ENCOUNTER — EXTERNAL CHRONIC CARE MANAGEMENT (OUTPATIENT)
Dept: FAMILY MEDICINE | Facility: CLINIC | Age: 68
End: 2023-03-31
Payer: MEDICARE

## 2023-03-31 PROCEDURE — G0511 PR CHRONIC CARE MGMT, RHC OR FQHC ONLY, 20 MINS OR MORE: ICD-10-PCS | Mod: ,,, | Performed by: NURSE PRACTITIONER

## 2023-03-31 PROCEDURE — G0511 CCM/BHI BY RHC/FQHC 20MIN MO: HCPCS | Mod: ,,, | Performed by: NURSE PRACTITIONER

## 2023-04-30 ENCOUNTER — EXTERNAL CHRONIC CARE MANAGEMENT (OUTPATIENT)
Dept: FAMILY MEDICINE | Facility: CLINIC | Age: 68
End: 2023-04-30
Payer: MEDICARE

## 2023-04-30 PROCEDURE — G0511 CCM/BHI BY RHC/FQHC 20MIN MO: HCPCS | Mod: ,,, | Performed by: NURSE PRACTITIONER

## 2023-04-30 PROCEDURE — G0511 PR CHRONIC CARE MGMT, RHC OR FQHC ONLY, 20 MINS OR MORE: ICD-10-PCS | Mod: ,,, | Performed by: NURSE PRACTITIONER

## 2023-05-14 DIAGNOSIS — I10 ESSENTIAL (PRIMARY) HYPERTENSION: Chronic | ICD-10-CM

## 2023-05-14 RX ORDER — LOSARTAN POTASSIUM AND HYDROCHLOROTHIAZIDE 25; 100 MG/1; MG/1
1 TABLET ORAL DAILY
Qty: 90 TABLET | Refills: 1 | Status: SHIPPED | OUTPATIENT
Start: 2023-05-14 | End: 2023-11-09

## 2023-05-31 ENCOUNTER — EXTERNAL CHRONIC CARE MANAGEMENT (OUTPATIENT)
Dept: FAMILY MEDICINE | Facility: CLINIC | Age: 68
End: 2023-05-31
Payer: MEDICARE

## 2023-05-31 PROCEDURE — G0511 CCM/BHI BY RHC/FQHC 20MIN MO: HCPCS | Mod: ,,, | Performed by: NURSE PRACTITIONER

## 2023-05-31 PROCEDURE — G0511 PR CHRONIC CARE MGMT, RHC OR FQHC ONLY, 20 MINS OR MORE: ICD-10-PCS | Mod: ,,, | Performed by: NURSE PRACTITIONER

## 2023-06-08 ENCOUNTER — OFFICE VISIT (OUTPATIENT)
Dept: FAMILY MEDICINE | Facility: CLINIC | Age: 68
End: 2023-06-08
Payer: MEDICARE

## 2023-06-08 VITALS
RESPIRATION RATE: 20 BRPM | OXYGEN SATURATION: 96 % | WEIGHT: 246.63 LBS | HEART RATE: 65 BPM | HEIGHT: 66 IN | SYSTOLIC BLOOD PRESSURE: 124 MMHG | DIASTOLIC BLOOD PRESSURE: 82 MMHG | TEMPERATURE: 98 F | BODY MASS INDEX: 39.64 KG/M2

## 2023-06-08 DIAGNOSIS — E03.9 HYPOTHYROIDISM (ACQUIRED): Chronic | ICD-10-CM

## 2023-06-08 DIAGNOSIS — E66.01 SEVERE OBESITY (BMI 35.0-39.9) WITH COMORBIDITY: Chronic | ICD-10-CM

## 2023-06-08 DIAGNOSIS — I10 ESSENTIAL (PRIMARY) HYPERTENSION: Primary | Chronic | ICD-10-CM

## 2023-06-08 DIAGNOSIS — Z79.899 ENCOUNTER FOR LONG-TERM (CURRENT) USE OF OTHER MEDICATIONS: ICD-10-CM

## 2023-06-08 DIAGNOSIS — E78.2 MIXED HYPERLIPIDEMIA: Chronic | ICD-10-CM

## 2023-06-08 DIAGNOSIS — Z13.1 DIABETES MELLITUS SCREENING: ICD-10-CM

## 2023-06-08 LAB
ALBUMIN SERPL BCP-MCNC: 3.9 G/DL (ref 3.5–5)
ALBUMIN/GLOB SERPL: 1.3 {RATIO}
ALP SERPL-CCNC: 97 U/L (ref 55–142)
ALT SERPL W P-5'-P-CCNC: 41 U/L (ref 13–56)
ANION GAP SERPL CALCULATED.3IONS-SCNC: 8 MMOL/L (ref 7–16)
AST SERPL W P-5'-P-CCNC: 29 U/L (ref 15–37)
BASOPHILS # BLD AUTO: 0.04 K/UL (ref 0–0.2)
BASOPHILS NFR BLD AUTO: 0.8 % (ref 0–1)
BILIRUB SERPL-MCNC: 0.8 MG/DL (ref ?–1.2)
BUN SERPL-MCNC: 19 MG/DL (ref 7–18)
BUN/CREAT SERPL: 19 (ref 6–20)
CALCIUM SERPL-MCNC: 9.8 MG/DL (ref 8.5–10.1)
CHLORIDE SERPL-SCNC: 105 MMOL/L (ref 98–107)
CHOLEST SERPL-MCNC: 133 MG/DL (ref 0–200)
CHOLEST/HDLC SERPL: 3.2 {RATIO}
CO2 SERPL-SCNC: 27 MMOL/L (ref 21–32)
CREAT SERPL-MCNC: 0.98 MG/DL (ref 0.55–1.02)
CREAT UR-MCNC: 39 MG/DL (ref 28–219)
DIFFERENTIAL METHOD BLD: ABNORMAL
EGFR (NO RACE VARIABLE) (RUSH/TITUS): 63 ML/MIN/1.73M2
EOSINOPHIL # BLD AUTO: 0.26 K/UL (ref 0–0.5)
EOSINOPHIL NFR BLD AUTO: 5.1 % (ref 1–4)
ERYTHROCYTE [DISTWIDTH] IN BLOOD BY AUTOMATED COUNT: 14.2 % (ref 11.5–14.5)
EST. AVERAGE GLUCOSE BLD GHB EST-MCNC: 100 MG/DL
GLOBULIN SER-MCNC: 3 G/DL (ref 2–4)
GLUCOSE SERPL-MCNC: 107 MG/DL (ref 74–106)
HBA1C MFR BLD HPLC: 5.6 % (ref 4.5–6.6)
HCT VFR BLD AUTO: 38.6 % (ref 38–47)
HDLC SERPL-MCNC: 41 MG/DL (ref 40–60)
HGB BLD-MCNC: 12.6 G/DL (ref 12–16)
IMM GRANULOCYTES # BLD AUTO: 0.02 K/UL (ref 0–0.04)
IMM GRANULOCYTES NFR BLD: 0.4 % (ref 0–0.4)
LDLC SERPL CALC-MCNC: 50 MG/DL
LDLC/HDLC SERPL: 1.2 {RATIO}
LYMPHOCYTES # BLD AUTO: 1.27 K/UL (ref 1–4.8)
LYMPHOCYTES NFR BLD AUTO: 24.7 % (ref 27–41)
MCH RBC QN AUTO: 27.5 PG (ref 27–31)
MCHC RBC AUTO-ENTMCNC: 32.6 G/DL (ref 32–36)
MCV RBC AUTO: 84.3 FL (ref 80–96)
MICROALBUMIN UR-MCNC: <0.5 MG/DL (ref 0–2.8)
MICROALBUMIN/CREAT RATIO PNL UR: <12.8 MG/G (ref 0–30)
MONOCYTES # BLD AUTO: 0.44 K/UL (ref 0–0.8)
MONOCYTES NFR BLD AUTO: 8.6 % (ref 2–6)
MPC BLD CALC-MCNC: 9.4 FL (ref 9.4–12.4)
NEUTROPHILS # BLD AUTO: 3.11 K/UL (ref 1.8–7.7)
NEUTROPHILS NFR BLD AUTO: 60.4 % (ref 53–65)
NONHDLC SERPL-MCNC: 92 MG/DL
NRBC # BLD AUTO: 0 X10E3/UL
NRBC, AUTO (.00): 0 %
PLATELET # BLD AUTO: 254 K/UL (ref 150–400)
POTASSIUM SERPL-SCNC: 3.8 MMOL/L (ref 3.5–5.1)
PROT SERPL-MCNC: 6.9 G/DL (ref 6.4–8.2)
RBC # BLD AUTO: 4.58 M/UL (ref 4.2–5.4)
SODIUM SERPL-SCNC: 136 MMOL/L (ref 136–145)
TRIGL SERPL-MCNC: 208 MG/DL (ref 35–150)
TSH SERPL DL<=0.005 MIU/L-ACNC: 2.26 UIU/ML (ref 0.36–3.74)
VLDLC SERPL-MCNC: 42 MG/DL
WBC # BLD AUTO: 5.14 K/UL (ref 4.5–11)

## 2023-06-08 PROCEDURE — 80061 LIPID PANEL: ICD-10-PCS | Mod: ,,, | Performed by: CLINICAL MEDICAL LABORATORY

## 2023-06-08 PROCEDURE — 80061 LIPID PANEL: CPT | Mod: ,,, | Performed by: CLINICAL MEDICAL LABORATORY

## 2023-06-08 PROCEDURE — 3288F PR FALLS RISK ASSESSMENT DOCUMENTED: ICD-10-PCS | Mod: ,,, | Performed by: NURSE PRACTITIONER

## 2023-06-08 PROCEDURE — 3079F DIAST BP 80-89 MM HG: CPT | Mod: ,,, | Performed by: NURSE PRACTITIONER

## 2023-06-08 PROCEDURE — 80050 PR  GENERAL HEALTH PANEL: ICD-10-PCS | Mod: ,,, | Performed by: CLINICAL MEDICAL LABORATORY

## 2023-06-08 PROCEDURE — 3079F PR MOST RECENT DIASTOLIC BLOOD PRESSURE 80-89 MM HG: ICD-10-PCS | Mod: ,,, | Performed by: NURSE PRACTITIONER

## 2023-06-08 PROCEDURE — 80050 GENERAL HEALTH PANEL: CPT | Mod: ,,, | Performed by: CLINICAL MEDICAL LABORATORY

## 2023-06-08 PROCEDURE — 1126F AMNT PAIN NOTED NONE PRSNT: CPT | Mod: ,,, | Performed by: NURSE PRACTITIONER

## 2023-06-08 PROCEDURE — 82043 MICROALBUMIN / CREATININE RATIO URINE: ICD-10-PCS | Mod: ,,, | Performed by: CLINICAL MEDICAL LABORATORY

## 2023-06-08 PROCEDURE — 3074F SYST BP LT 130 MM HG: CPT | Mod: ,,, | Performed by: NURSE PRACTITIONER

## 2023-06-08 PROCEDURE — 3066F NEPHROPATHY DOC TX: CPT | Mod: ,,, | Performed by: NURSE PRACTITIONER

## 2023-06-08 PROCEDURE — 3008F PR BODY MASS INDEX (BMI) DOCUMENTED: ICD-10-PCS | Mod: ,,, | Performed by: NURSE PRACTITIONER

## 2023-06-08 PROCEDURE — 99214 PR OFFICE/OUTPT VISIT, EST, LEVL IV, 30-39 MIN: ICD-10-PCS | Mod: ,,, | Performed by: NURSE PRACTITIONER

## 2023-06-08 PROCEDURE — 1101F PR PT FALLS ASSESS DOC 0-1 FALLS W/OUT INJ PAST YR: ICD-10-PCS | Mod: ,,, | Performed by: NURSE PRACTITIONER

## 2023-06-08 PROCEDURE — 99214 OFFICE O/P EST MOD 30 MIN: CPT | Mod: ,,, | Performed by: NURSE PRACTITIONER

## 2023-06-08 PROCEDURE — 83036 HEMOGLOBIN A1C: ICD-10-PCS | Mod: ,,, | Performed by: CLINICAL MEDICAL LABORATORY

## 2023-06-08 PROCEDURE — 1126F PR PAIN SEVERITY QUANTIFIED, NO PAIN PRESENT: ICD-10-PCS | Mod: ,,, | Performed by: NURSE PRACTITIONER

## 2023-06-08 PROCEDURE — 1160F RVW MEDS BY RX/DR IN RCRD: CPT | Mod: ,,, | Performed by: NURSE PRACTITIONER

## 2023-06-08 PROCEDURE — 3008F BODY MASS INDEX DOCD: CPT | Mod: ,,, | Performed by: NURSE PRACTITIONER

## 2023-06-08 PROCEDURE — 3061F PR NEG MICROALBUMINURIA RESULT DOCUMENTED/REVIEW: ICD-10-PCS | Mod: ,,, | Performed by: NURSE PRACTITIONER

## 2023-06-08 PROCEDURE — 82570 MICROALBUMIN / CREATININE RATIO URINE: ICD-10-PCS | Mod: ,,, | Performed by: CLINICAL MEDICAL LABORATORY

## 2023-06-08 PROCEDURE — 3288F FALL RISK ASSESSMENT DOCD: CPT | Mod: ,,, | Performed by: NURSE PRACTITIONER

## 2023-06-08 PROCEDURE — 3074F PR MOST RECENT SYSTOLIC BLOOD PRESSURE < 130 MM HG: ICD-10-PCS | Mod: ,,, | Performed by: NURSE PRACTITIONER

## 2023-06-08 PROCEDURE — 83036 HEMOGLOBIN GLYCOSYLATED A1C: CPT | Mod: ,,, | Performed by: CLINICAL MEDICAL LABORATORY

## 2023-06-08 PROCEDURE — 1159F MED LIST DOCD IN RCRD: CPT | Mod: ,,, | Performed by: NURSE PRACTITIONER

## 2023-06-08 PROCEDURE — 82570 ASSAY OF URINE CREATININE: CPT | Mod: ,,, | Performed by: CLINICAL MEDICAL LABORATORY

## 2023-06-08 PROCEDURE — 3061F NEG MICROALBUMINURIA REV: CPT | Mod: ,,, | Performed by: NURSE PRACTITIONER

## 2023-06-08 PROCEDURE — 3066F PR DOCUMENTATION OF TREATMENT FOR NEPHROPATHY: ICD-10-PCS | Mod: ,,, | Performed by: NURSE PRACTITIONER

## 2023-06-08 PROCEDURE — 1159F PR MEDICATION LIST DOCUMENTED IN MEDICAL RECORD: ICD-10-PCS | Mod: ,,, | Performed by: NURSE PRACTITIONER

## 2023-06-08 PROCEDURE — 82043 UR ALBUMIN QUANTITATIVE: CPT | Mod: ,,, | Performed by: CLINICAL MEDICAL LABORATORY

## 2023-06-08 PROCEDURE — 1101F PT FALLS ASSESS-DOCD LE1/YR: CPT | Mod: ,,, | Performed by: NURSE PRACTITIONER

## 2023-06-08 PROCEDURE — 1160F PR REVIEW ALL MEDS BY PRESCRIBER/CLIN PHARMACIST DOCUMENTED: ICD-10-PCS | Mod: ,,, | Performed by: NURSE PRACTITIONER

## 2023-06-08 NOTE — PROGRESS NOTES
[facility] - FAMILY MEDICINE       PATIENT NAME: Dominic Casas   : 1955    AGE: 67 y.o. DATE OF ENCOUNTER: 23    MRN: [id number]      PCP: NELI Badillo    Reason for Visit / Chief Complaint:  Hypertension, Hypothyroidism, and Follow-up (Patient presents to clinic for 6 month follow up of HTN and hypothyroidism)         274}    Subjective:     HPI:    Presents for 6-mth f/u HTN, hypothyroidism, and HLD.    Chronic migraines - much better than in past, but still occasionally needs imitrex, main trigger is barometric pressure changes    Review of Systems:   Review of Systems   Constitutional:  Negative for activity change and unexpected weight change.   HENT:  Negative for hearing loss, rhinorrhea and trouble swallowing.    Eyes:  Negative for discharge and visual disturbance.   Respiratory:  Negative for chest tightness, shortness of breath and wheezing.    Cardiovascular:  Negative for chest pain and palpitations.   Gastrointestinal:  Negative for blood in stool, constipation, diarrhea and vomiting.   Endocrine: Negative for polydipsia and polyuria.   Genitourinary:  Negative for difficulty urinating, dysuria, hematuria and menstrual problem.   Musculoskeletal:  Positive for arthralgias (chronic). Negative for joint swelling and neck pain.   Neurological:  Negative for weakness and headaches (chronic).   Psychiatric/Behavioral:  Negative for confusion and dysphoric mood.      Allergies and Meds: 274}     Review of patient's allergies indicates:   Allergen Reactions    Codeine Hives and Itching        Current Outpatient Medications:     atorvastatin (LIPITOR) 20 MG tablet, Take 1 tablet (20 mg total) by mouth once daily., Disp: 90 tablet, Rfl: 3    levothyroxine (SYNTHROID) 88 MCG tablet, Take 1 tablet (88 mcg total) by mouth once daily., Disp: 90 tablet, Rfl: 3    losartan-hydrochlorothiazide 100-25 mg (HYZAAR) 100-25 mg per tablet, Take 1 tablet by mouth once daily., Disp: 90  tablet, Rfl: 1    promethazine (PHENERGAN) 25 MG tablet, Take 2 tablets (50 mg total) by mouth every 4 (four) hours as needed for Nausea. 1-2 tablet every 4 hours as needed, Disp: 30 tablet, Rfl: 1    sumatriptan (IMITREX) 100 MG tablet, Take 1 tablet (100 mg total) by mouth every 2 (two) hours as needed for Migraine. May repeat dose in 2 hrs once in 24 hours.  Max dosage 200 mg in 24 hours., Disp: 27 tablet, Rfl: 3    SUMAtriptan succinate 6 mg/0.5 mL Crtg, Inject into the skin., Disp: , Rfl:     Labs:274}    I have reviewed old labs below:  Lab Results   Component Value Date    WBC 6.28 06/06/2022    RBC 4.74 06/06/2022    HGB 13.0 06/06/2022    HCT 41.0 06/06/2022     06/06/2022     12/07/2022    K 3.8 12/07/2022     12/07/2022    CALCIUM 9.1 12/07/2022    GLU 99 12/07/2022    BUN 15 12/07/2022    CREATININE 0.98 12/07/2022    EGFRNONAA 56 (L) 06/06/2022    ALT 41 12/07/2022    AST 24 12/07/2022    CHOL 138 12/07/2022    TRIG 167 (H) 12/07/2022    HDL 44 12/07/2022    LDLCALC 61 12/07/2022    TSH 2.240 12/07/2022    MICROALBUR <0.5 06/06/2022       Medical History: 274}     Past Medical History:   Diagnosis Date    Cholelithiasis     4/26/21 per US (report scanned in); HIDA scan 5/7/21 normal w/ EF49% (scanned in)    Chronic migraine without aura or status migrainosus     Hyperlipidemia     Hypertension     Hypothyroidism     Lumbar degenerative disc disease     Osteoarthritis of multiple joints     Spondylosis of cervical spine     Spondylosis, lumbosacral       Social History     Tobacco Use   Smoking Status Never   Smokeless Tobacco Never      Past Surgical History:   Procedure Laterality Date    PARTIAL KNEE ARTHROPLASTY Right 06/2017    Dr. Jacobs    SHOULDER ARTHROSCOPY Left 07/2012    left rotator cuff repair    TOTAL HIP ARTHROPLASTY Left 09/2017    TOTAL HIP ARTHROPLASTY Right 10/07/2019    Dr. [place]      Objective:  274}   /82 (BP Location: Left arm, Patient Position:  "Sitting, BP Method: Large (Manual))   Pulse 65   Temp 98.3 °F (36.8 °C) (Oral)   Resp 20   Ht 5' 6" (1.676 m)   Wt 111.9 kg (246 lb 9.6 oz)   SpO2 96%   BMI 39.80 kg/m²     Wt Readings from Last 3 Encounters:   06/08/23 111.9 kg (246 lb 9.6 oz)   02/06/23 108 kg (238 lb)   01/05/23 108 kg (238 lb)     BP Readings from Last 3 Encounters:   06/08/23 124/82   02/06/23 120/78   01/05/23 132/80     Body mass index is 39.8 kg/m².     Physical Exam  Vitals and nursing note reviewed.   Constitutional:       General: She is not in acute distress.     Appearance: Normal appearance.   HENT:      Head: Normocephalic.      Right Ear: Tympanic membrane, ear canal and external ear normal.      Left Ear: Tympanic membrane, ear canal and external ear normal.      Nose: Nose normal.      Mouth/Throat:      Mouth: Mucous membranes are moist.      Pharynx: Oropharynx is clear.   Eyes:      Conjunctiva/sclera: Conjunctivae normal.   Neck:      Thyroid: No thyroid mass or thyromegaly.      Vascular: Normal carotid pulses. No carotid bruit.   Cardiovascular:      Rate and Rhythm: Normal rate and regular rhythm.      Pulses: Normal pulses.      Heart sounds: Normal heart sounds.   Pulmonary:      Effort: Pulmonary effort is normal.      Breath sounds: Normal breath sounds.   Musculoskeletal:      Cervical back: Neck supple.      Right lower leg: No edema.      Left lower leg: No edema.   Lymphadenopathy:      Cervical: No cervical adenopathy.   Skin:     General: Skin is warm and dry.   Neurological:      General: No focal deficit present.      Mental Status: She is alert and oriented to person, place, and time.   Psychiatric:         Mood and Affect: Mood normal.         Behavior: Behavior normal.        Assessment and Plan: 274}     1. Essential (primary) hypertension  Comments:  Controlled, continue losartan hydrochlorothiazide 100/25 mg daily.    2. Mixed hyperlipidemia  Comments:  Not controlled, persistent triglyceride " elevation.    Re-evaluate today.    Continue atorvastatin.  Orders:  -     Comprehensive Metabolic Panel; Future; Expected date: 06/08/2023  -     Lipid Panel; Future; Expected date: 06/08/2023    3. Hypothyroidism (acquired)  Comments:  Controlled, continue levothyroxine.  Orders:  -     TSH; Future; Expected date: 06/08/2023    4. Encounter for long-term (current) use of other medications  -     CBC Auto Differential; Future; Expected date: 06/08/2023  -     Comprehensive Metabolic Panel; Future; Expected date: 06/08/2023  -     Microalbumin/Creatinine Ratio, Urine; Future; Expected date: 06/08/2023  -     Hemoglobin A1C; Future; Expected date: 06/08/2023    5. Diabetes mellitus screening  -     Hemoglobin A1C; Future; Expected date: 06/08/2023    6. Severe obesity (BMI 35.0-39.9) with comorbidity  Comments:  Stressed the need for healthy diet, increase physical activity, and weight loss.  Orders:  -     Hemoglobin A1C; Future; Expected date: 06/08/2023       Return to clinic 6-mth f/u HTN, hypothyroidism, and HLD, fasting; and sooner as needed.    Future Appointments   Date Time Provider Department Center   7/12/2023 10:15 AM [facility] [place]   12/18/2023  7:40 AM NELI Badillo [facility] [place]   1/11/2024 10:00 AM AWV NURSE, [facility] FAMILY MEDICINE [facility] [place]        Signature:  NELI Badillo

## 2023-06-30 ENCOUNTER — EXTERNAL CHRONIC CARE MANAGEMENT (OUTPATIENT)
Dept: FAMILY MEDICINE | Facility: CLINIC | Age: 68
End: 2023-06-30
Payer: MEDICARE

## 2023-06-30 PROCEDURE — G0511 CCM/BHI BY RHC/FQHC 20MIN MO: HCPCS | Mod: ,,, | Performed by: NURSE PRACTITIONER

## 2023-06-30 PROCEDURE — G0511 PR CHRONIC CARE MGMT, RHC OR FQHC ONLY, 20 MINS OR MORE: ICD-10-PCS | Mod: ,,, | Performed by: NURSE PRACTITIONER

## 2023-07-12 ENCOUNTER — HOSPITAL ENCOUNTER (OUTPATIENT)
Dept: RADIOLOGY | Facility: HOSPITAL | Age: 68
Discharge: HOME | End: 2023-07-12
Attending: OBSTETRICS & GYNECOLOGY
Payer: MEDICARE

## 2023-07-12 VITALS — HEIGHT: 68 IN | BODY MASS INDEX: 36.37 KG/M2 | WEIGHT: 240 LBS

## 2023-07-12 DIAGNOSIS — E78.2 MIXED HYPERLIPIDEMIA: ICD-10-CM

## 2023-07-12 DIAGNOSIS — Z12.31 BREAST CANCER SCREENING BY MAMMOGRAM: ICD-10-CM

## 2023-07-12 PROCEDURE — 77067 SCR MAMMO BI INCL CAD: CPT | Mod: TC

## 2023-07-12 RX ORDER — ATORVASTATIN CALCIUM 20 MG/1
20 TABLET, FILM COATED ORAL DAILY
Qty: 90 TABLET | Refills: 3 | Status: SHIPPED | OUTPATIENT
Start: 2023-07-12

## 2023-07-31 ENCOUNTER — EXTERNAL CHRONIC CARE MANAGEMENT (OUTPATIENT)
Dept: FAMILY MEDICINE | Facility: CLINIC | Age: 68
End: 2023-07-31
Payer: MEDICARE

## 2023-07-31 PROCEDURE — G0511 CCM/BHI BY RHC/FQHC 20MIN MO: HCPCS | Mod: ,,, | Performed by: NURSE PRACTITIONER

## 2023-07-31 PROCEDURE — G0511 PR CHRONIC CARE MGMT, RHC OR FQHC ONLY, 20 MINS OR MORE: ICD-10-PCS | Mod: ,,, | Performed by: NURSE PRACTITIONER

## 2023-08-31 ENCOUNTER — EXTERNAL CHRONIC CARE MANAGEMENT (OUTPATIENT)
Dept: FAMILY MEDICINE | Facility: CLINIC | Age: 68
End: 2023-08-31
Payer: MEDICARE

## 2023-08-31 PROCEDURE — G0511 CCM/BHI BY RHC/FQHC 20MIN MO: HCPCS | Mod: ,,, | Performed by: NURSE PRACTITIONER

## 2023-08-31 PROCEDURE — G0511 PR CHRONIC CARE MGMT, RHC OR FQHC ONLY, 20 MINS OR MORE: ICD-10-PCS | Mod: ,,, | Performed by: NURSE PRACTITIONER

## 2023-09-30 ENCOUNTER — EXTERNAL CHRONIC CARE MANAGEMENT (OUTPATIENT)
Dept: FAMILY MEDICINE | Facility: CLINIC | Age: 68
End: 2023-09-30
Payer: MEDICARE

## 2023-09-30 PROCEDURE — G0511 CCM/BHI BY RHC/FQHC 20MIN MO: HCPCS | Mod: ,,, | Performed by: NURSE PRACTITIONER

## 2023-09-30 PROCEDURE — G0511 PR CHRONIC CARE MGMT, RHC OR FQHC ONLY, 20 MINS OR MORE: ICD-10-PCS | Mod: ,,, | Performed by: NURSE PRACTITIONER

## 2023-10-15 DIAGNOSIS — G43.709 CHRONIC MIGRAINE WITHOUT AURA WITHOUT STATUS MIGRAINOSUS, NOT INTRACTABLE: Chronic | ICD-10-CM

## 2023-10-15 RX ORDER — SUMATRIPTAN SUCCINATE 100 MG/1
100 TABLET ORAL
Qty: 27 TABLET | Refills: 3 | Status: SHIPPED | OUTPATIENT
Start: 2023-10-15

## 2023-10-17 ENCOUNTER — OFFICE VISIT (OUTPATIENT)
Dept: FAMILY MEDICINE | Facility: CLINIC | Age: 68
End: 2023-10-17
Payer: MEDICARE

## 2023-10-17 VITALS
SYSTOLIC BLOOD PRESSURE: 120 MMHG | HEART RATE: 82 BPM | HEIGHT: 68 IN | DIASTOLIC BLOOD PRESSURE: 77 MMHG | RESPIRATION RATE: 20 BRPM | OXYGEN SATURATION: 97 % | WEIGHT: 248 LBS | BODY MASS INDEX: 37.59 KG/M2

## 2023-10-17 DIAGNOSIS — U07.1 COVID-19: Primary | ICD-10-CM

## 2023-10-17 LAB
CTP QC/QA: YES
FLUAV AG NPH QL: NEGATIVE
FLUBV AG NPH QL: NEGATIVE
S PYO RRNA THROAT QL PROBE: NEGATIVE
SARS-COV-2 AG RESP QL IA.RAPID: POSITIVE

## 2023-10-17 PROCEDURE — 3066F PR DOCUMENTATION OF TREATMENT FOR NEPHROPATHY: ICD-10-PCS | Mod: ,,, | Performed by: NURSE PRACTITIONER

## 2023-10-17 PROCEDURE — 3008F PR BODY MASS INDEX (BMI) DOCUMENTED: ICD-10-PCS | Mod: ,,, | Performed by: NURSE PRACTITIONER

## 2023-10-17 PROCEDURE — 1125F AMNT PAIN NOTED PAIN PRSNT: CPT | Mod: ,,, | Performed by: NURSE PRACTITIONER

## 2023-10-17 PROCEDURE — 1101F PT FALLS ASSESS-DOCD LE1/YR: CPT | Mod: ,,, | Performed by: NURSE PRACTITIONER

## 2023-10-17 PROCEDURE — 3061F NEG MICROALBUMINURIA REV: CPT | Mod: ,,, | Performed by: NURSE PRACTITIONER

## 2023-10-17 PROCEDURE — 99213 OFFICE O/P EST LOW 20 MIN: CPT | Mod: ,,, | Performed by: NURSE PRACTITIONER

## 2023-10-17 PROCEDURE — 1159F PR MEDICATION LIST DOCUMENTED IN MEDICAL RECORD: ICD-10-PCS | Mod: ,,, | Performed by: NURSE PRACTITIONER

## 2023-10-17 PROCEDURE — 3066F NEPHROPATHY DOC TX: CPT | Mod: ,,, | Performed by: NURSE PRACTITIONER

## 2023-10-17 PROCEDURE — 1101F PR PT FALLS ASSESS DOC 0-1 FALLS W/OUT INJ PAST YR: ICD-10-PCS | Mod: ,,, | Performed by: NURSE PRACTITIONER

## 2023-10-17 PROCEDURE — 3078F PR MOST RECENT DIASTOLIC BLOOD PRESSURE < 80 MM HG: ICD-10-PCS | Mod: ,,, | Performed by: NURSE PRACTITIONER

## 2023-10-17 PROCEDURE — 1125F PR PAIN SEVERITY QUANTIFIED, PAIN PRESENT: ICD-10-PCS | Mod: ,,, | Performed by: NURSE PRACTITIONER

## 2023-10-17 PROCEDURE — 3061F PR NEG MICROALBUMINURIA RESULT DOCUMENTED/REVIEW: ICD-10-PCS | Mod: ,,, | Performed by: NURSE PRACTITIONER

## 2023-10-17 PROCEDURE — 3074F SYST BP LT 130 MM HG: CPT | Mod: ,,, | Performed by: NURSE PRACTITIONER

## 2023-10-17 PROCEDURE — 3074F PR MOST RECENT SYSTOLIC BLOOD PRESSURE < 130 MM HG: ICD-10-PCS | Mod: ,,, | Performed by: NURSE PRACTITIONER

## 2023-10-17 PROCEDURE — 87804 INFLUENZA ASSAY W/OPTIC: CPT | Mod: 59,QW,, | Performed by: NURSE PRACTITIONER

## 2023-10-17 PROCEDURE — 3008F BODY MASS INDEX DOCD: CPT | Mod: ,,, | Performed by: NURSE PRACTITIONER

## 2023-10-17 PROCEDURE — 3044F PR MOST RECENT HEMOGLOBIN A1C LEVEL <7.0%: ICD-10-PCS | Mod: ,,, | Performed by: NURSE PRACTITIONER

## 2023-10-17 PROCEDURE — 3288F FALL RISK ASSESSMENT DOCD: CPT | Mod: ,,, | Performed by: NURSE PRACTITIONER

## 2023-10-17 PROCEDURE — 3044F HG A1C LEVEL LT 7.0%: CPT | Mod: ,,, | Performed by: NURSE PRACTITIONER

## 2023-10-17 PROCEDURE — 3288F PR FALLS RISK ASSESSMENT DOCUMENTED: ICD-10-PCS | Mod: ,,, | Performed by: NURSE PRACTITIONER

## 2023-10-17 PROCEDURE — 3078F DIAST BP <80 MM HG: CPT | Mod: ,,, | Performed by: NURSE PRACTITIONER

## 2023-10-17 PROCEDURE — 99213 PR OFFICE/OUTPT VISIT, EST, LEVL III, 20-29 MIN: ICD-10-PCS | Mod: ,,, | Performed by: NURSE PRACTITIONER

## 2023-10-17 PROCEDURE — 87426 SARS CORONAVIRUS 2 ANTIGEN POCT: ICD-10-PCS | Mod: QW,,, | Performed by: NURSE PRACTITIONER

## 2023-10-17 PROCEDURE — 87880 STREP A ASSAY W/OPTIC: CPT | Mod: QW,,, | Performed by: NURSE PRACTITIONER

## 2023-10-17 PROCEDURE — 87880 POCT RAPID STREP A: ICD-10-PCS | Mod: QW,,, | Performed by: NURSE PRACTITIONER

## 2023-10-17 PROCEDURE — 87804 POCT INFLUENZA A/B: ICD-10-PCS | Mod: 59,QW,, | Performed by: NURSE PRACTITIONER

## 2023-10-17 PROCEDURE — 1159F MED LIST DOCD IN RCRD: CPT | Mod: ,,, | Performed by: NURSE PRACTITIONER

## 2023-10-17 PROCEDURE — 87426 SARSCOV CORONAVIRUS AG IA: CPT | Mod: QW,,, | Performed by: NURSE PRACTITIONER

## 2023-10-17 NOTE — PROGRESS NOTES
[facility] - FAMILY MEDICINE       PATIENT NAME: Dominic Casas   : 1955    AGE: 67 y.o. DATE OF ENCOUNTER: 10/17/23   MRN: [id number]      Visit type: WALK-IN  PCP:  Pita Ramos FNP    Reason for Visit / Chief Complaint: Sore Throat, Nasal Congestion (Patient presents to the clinic for uri has pressure on right side), and Cough     Subjective:     Presents c/o developed a sore throat yesterday and overnight began feeling progressively worse.  Was exposed to positive COVID 3 days ago.    Review of Systems:     Review of Systems   Constitutional:  Positive for chills, fatigue and fever.   HENT:  Positive for congestion, ear pain, rhinorrhea, sinus pressure and sore throat.    Respiratory:  Positive for cough. Negative for shortness of breath and wheezing.    Cardiovascular: Negative.    Gastrointestinal:  Positive for nausea. Negative for abdominal pain, diarrhea and vomiting.   Musculoskeletal:  Positive for myalgias.   Skin: Negative.    Neurological:  Positive for headaches.       Allergies and Meds:     Review of patient's allergies indicates:   Allergen Reactions    Codeine Hives and Itching        Current Outpatient Medications:     atorvastatin (LIPITOR) 20 MG tablet, Take 1 tablet (20 mg total) by mouth once daily., Disp: 90 tablet, Rfl: 3    levothyroxine (SYNTHROID) 88 MCG tablet, Take 1 tablet (88 mcg total) by mouth once daily., Disp: 90 tablet, Rfl: 3    losartan-hydrochlorothiazide 100-25 mg (HYZAAR) 100-25 mg per tablet, Take 1 tablet by mouth once daily., Disp: 90 tablet, Rfl: 1    promethazine (PHENERGAN) 25 MG tablet, Take 2 tablets (50 mg total) by mouth every 4 (four) hours as needed for Nausea. 1-2 tablet every 4 hours as needed, Disp: 30 tablet, Rfl: 1    sumatriptan (IMITREX) 100 MG tablet, Take 1 tablet (100 mg total) by mouth every 2 (two) hours as needed for Migraine. May repeat dose in 2 hrs once in 24 hours.  Max dosage 200 mg in 24 hours. (Patient taking  "differently: Take 50 mg by mouth every 2 (two) hours as needed for Migraine. May repeat dose in 2 hrs once in 24 hours.  Max dosage 200 mg in 24 hours.), Disp: 27 tablet, Rfl: 3    SUMAtriptan succinate 6 mg/0.5 mL Crtg, Inject 0.5 mLs into the skin as needed (HA)., Disp: , Rfl:     Medical History:     Past Medical History:   Diagnosis Date    Cholelithiasis     4/26/21 per US (report scanned in); HIDA scan 5/7/21 normal w/ EF49% (scanned in)    Chronic migraine without aura or status migrainosus     Hyperlipidemia     Hypertension     Hypothyroidism     Lumbar degenerative disc disease     Osteoarthritis of multiple joints     Spondylosis of cervical spine     Spondylosis, lumbosacral       Social History     Tobacco Use   Smoking Status Never   Smokeless Tobacco Never      Past Surgical History:   Procedure Laterality Date    PARTIAL KNEE ARTHROPLASTY Right 06/2017    Dr. Jacobs    SHOULDER ARTHROSCOPY Left 07/2012    left rotator cuff repair    TOTAL HIP ARTHROPLASTY Left 09/2017    TOTAL HIP ARTHROPLASTY Right 10/07/2019    Dr. [place]      Immunization History   Administered Date(s) Administered    COVID-19 MRNA, LN-S PF (MODERNA HALF 0.25 ML DOSE) 10/27/2021    COVID-19, MRNA, LN-S, PF (MODERNA FULL 0.5 ML DOSE) 02/01/2021, 03/01/2021    Influenza 10/01/2020, 11/11/2021, 11/09/2022    Influenza - High Dose - PF (65 years and older) 11/11/2021, 11/02/2022    Pneumococcal Conjugate - 20 Valent 12/07/2022    Pneumococcal Polysaccharide - 23 Valent 10/30/2019, 12/16/2020    Tdap 08/14/2013    Zoster 06/21/2021    Zoster Recombinant 10/30/2019, 12/02/2021        Objective:     Wt Readings from Last 3 Encounters:   10/17/23 0933 112.5 kg (248 lb)   07/12/23 1132 108.9 kg (240 lb)   06/08/23 0905 111.9 kg (246 lb 9.6 oz)       /77 (BP Location: Right arm, Patient Position: Sitting, BP Method: Large (Automatic))   Pulse 82   Resp 20   Ht 5' 8" (1.727 m)   Wt 112.5 kg (248 lb)   SpO2 97%   BMI 37.71 " kg/m²   Body mass index is 37.71 kg/m².     Physical Exam:    Physical Exam  Vitals and nursing note reviewed.   Constitutional:       General: She is not in acute distress.     Appearance: Normal appearance. She is ill-appearing.   HENT:      Head: Normocephalic.      Right Ear: Hearing, tympanic membrane, ear canal and external ear normal.      Left Ear: Hearing, tympanic membrane, ear canal and external ear normal.      Nose: Congestion and rhinorrhea present. Rhinorrhea is clear.      Right Turbinates: Swollen.      Left Turbinates: Swollen.      Right Sinus: Maxillary sinus tenderness present. No frontal sinus tenderness.      Left Sinus: Maxillary sinus tenderness present. No frontal sinus tenderness.      Mouth/Throat:      Lips: Pink.      Mouth: Mucous membranes are moist.      Tongue: No lesions.      Pharynx: Uvula midline. No pharyngeal swelling, oropharyngeal exudate, posterior oropharyngeal erythema (injected, PND) or uvula swelling.   Eyes:      Conjunctiva/sclera: Conjunctivae normal.      Pupils: Pupils are equal, round, and reactive to light.   Cardiovascular:      Rate and Rhythm: Normal rate and regular rhythm.      Heart sounds: Normal heart sounds.   Pulmonary:      Effort: Pulmonary effort is normal. No respiratory distress.      Breath sounds: Normal breath sounds.   Musculoskeletal:      Cervical back: No rigidity.   Lymphadenopathy:      Cervical: No cervical adenopathy.   Skin:     General: Skin is warm and dry.   Neurological:      General: No focal deficit present.      Mental Status: She is alert and oriented to person, place, and time.       Assessment and Plan:        1. COVID-19  -     SARS Coronavirus 2 Antigen, POCT  -     POCT Influenza A/B Rapid Antigen  -     POCT rapid strep A      Office Visit on 10/17/2023   Component Date Value Ref Range Status    SARS Coronavirus 2 Antigen 10/17/2023 Positive (A)  Negative Final     Acceptable 10/17/2023 Yes   Final    Rapid  Influenza A Ag 10/17/2023 Negative  Negative Final    Rapid Influenza B Ag 10/17/2023 Negative  Negative Final     Acceptable 10/17/2023 Yes   Final    Rapid Strep A Screen 10/17/2023 Negative  Negative Final     Acceptable 10/17/2023 Yes   Final     Advised to quarantine per updated CDC guidelines.  Advised COVID-19 is a virus and viruses do not respond to antibiotics nor do antibiotics prevent a bacterial infection from developing.   Advised steroids are not recommended for outpatient management of covid symptoms.   Advised of EAU anti-viral within first 5 days of illness if not contraindicated by medications, liver or renal function, or other medical condition.  Declines Paxlovid.   Treatment for COVID-19 is primarily supportive including rest, increase fluids, and the use of OTC meds to help alleviate symptoms.    Acetaminophen or ibuprofen as needed for body aches, headache, or fever.  Avoid NSAIDs if history of renal disease.  Call the office if you have other questions or concerns that arise.     F/u as needed or if symptoms worsen or persist.  Future Appointments   Date Time Provider Department Center   12/18/2023  7:40 AM Pita Ramos FNP [facility] [place]   1/11/2024 10:00 AM [facility] NURSE, [facility] FAMILY MEDICINE [facility] [place]   7/18/2024 10:15 AM [facility] [place]       Signature: Pita Ramos [facility]-BC

## 2023-10-18 ENCOUNTER — TELEPHONE (OUTPATIENT)
Dept: FAMILY MEDICINE | Facility: CLINIC | Age: 68
End: 2023-10-18
Payer: MEDICARE

## 2023-10-18 NOTE — TELEPHONE ENCOUNTER
----- Message from Poornima Medina sent at 10/18/2023  9:09 AM CDT -----  IMITREM TO [facility] NH

## 2023-10-31 ENCOUNTER — EXTERNAL CHRONIC CARE MANAGEMENT (OUTPATIENT)
Dept: FAMILY MEDICINE | Facility: CLINIC | Age: 68
End: 2023-10-31
Payer: MEDICARE

## 2023-10-31 PROCEDURE — G0511 CCM/BHI BY RHC/FQHC 20MIN MO: HCPCS | Mod: ,,, | Performed by: NURSE PRACTITIONER

## 2023-10-31 PROCEDURE — G0511 PR CHRONIC CARE MGMT, RHC OR FQHC ONLY, 20 MINS OR MORE: ICD-10-PCS | Mod: ,,, | Performed by: NURSE PRACTITIONER

## 2023-11-09 DIAGNOSIS — I10 ESSENTIAL (PRIMARY) HYPERTENSION: Chronic | ICD-10-CM

## 2023-11-09 RX ORDER — LOSARTAN POTASSIUM AND HYDROCHLOROTHIAZIDE 25; 100 MG/1; MG/1
1 TABLET ORAL DAILY
Qty: 90 TABLET | Refills: 1 | Status: SHIPPED | OUTPATIENT
Start: 2023-11-09

## 2023-11-29 DIAGNOSIS — E03.9 HYPOTHYROIDISM (ACQUIRED): Primary | ICD-10-CM

## 2023-11-29 RX ORDER — LEVOTHYROXINE SODIUM 88 UG/1
88 TABLET ORAL
Qty: 90 TABLET | Refills: 3 | Status: SHIPPED | OUTPATIENT
Start: 2023-11-29

## 2023-11-30 ENCOUNTER — EXTERNAL CHRONIC CARE MANAGEMENT (OUTPATIENT)
Dept: FAMILY MEDICINE | Facility: CLINIC | Age: 68
End: 2023-11-30
Payer: MEDICARE

## 2023-11-30 PROCEDURE — G0511 PR CHRONIC CARE MGMT, RHC OR FQHC ONLY, 20 MINS OR MORE: ICD-10-PCS | Mod: ,,, | Performed by: NURSE PRACTITIONER

## 2023-11-30 PROCEDURE — G0511 CCM/BHI BY RHC/FQHC 20MIN MO: HCPCS | Mod: ,,, | Performed by: NURSE PRACTITIONER

## 2023-12-18 ENCOUNTER — OFFICE VISIT (OUTPATIENT)
Dept: FAMILY MEDICINE | Facility: CLINIC | Age: 68
End: 2023-12-18
Payer: MEDICARE

## 2023-12-18 VITALS
BODY MASS INDEX: 36.83 KG/M2 | HEART RATE: 65 BPM | OXYGEN SATURATION: 95 % | SYSTOLIC BLOOD PRESSURE: 138 MMHG | RESPIRATION RATE: 20 BRPM | HEIGHT: 68 IN | DIASTOLIC BLOOD PRESSURE: 79 MMHG | TEMPERATURE: 99 F | WEIGHT: 243 LBS

## 2023-12-18 DIAGNOSIS — G43.709 CHRONIC MIGRAINE WITHOUT AURA WITHOUT STATUS MIGRAINOSUS, NOT INTRACTABLE: Chronic | ICD-10-CM

## 2023-12-18 DIAGNOSIS — Z79.899 ENCOUNTER FOR LONG-TERM (CURRENT) USE OF OTHER MEDICATIONS: ICD-10-CM

## 2023-12-18 DIAGNOSIS — I10 ESSENTIAL (PRIMARY) HYPERTENSION: Primary | Chronic | ICD-10-CM

## 2023-12-18 DIAGNOSIS — E03.9 HYPOTHYROIDISM (ACQUIRED): Chronic | ICD-10-CM

## 2023-12-18 DIAGNOSIS — E78.2 MIXED HYPERLIPIDEMIA: Chronic | ICD-10-CM

## 2023-12-18 LAB
ALBUMIN SERPL BCP-MCNC: 3.7 G/DL (ref 3.5–5)
ALBUMIN/GLOB SERPL: 1.2 {RATIO}
ALP SERPL-CCNC: 95 U/L (ref 55–142)
ALT SERPL W P-5'-P-CCNC: 44 U/L (ref 13–56)
ANION GAP SERPL CALCULATED.3IONS-SCNC: 7 MMOL/L (ref 7–16)
AST SERPL W P-5'-P-CCNC: 28 U/L (ref 15–37)
BILIRUB SERPL-MCNC: 0.6 MG/DL (ref ?–1.2)
BUN SERPL-MCNC: 17 MG/DL (ref 7–18)
BUN/CREAT SERPL: 18 (ref 6–20)
CALCIUM SERPL-MCNC: 9.6 MG/DL (ref 8.5–10.1)
CHLORIDE SERPL-SCNC: 106 MMOL/L (ref 98–107)
CHOLEST SERPL-MCNC: 146 MG/DL (ref 0–200)
CHOLEST/HDLC SERPL: 3.4 {RATIO}
CO2 SERPL-SCNC: 31 MMOL/L (ref 21–32)
CREAT SERPL-MCNC: 0.96 MG/DL (ref 0.55–1.02)
EGFR (NO RACE VARIABLE) (RUSH/TITUS): 65 ML/MIN/1.73M2
GLOBULIN SER-MCNC: 3.2 G/DL (ref 2–4)
GLUCOSE SERPL-MCNC: 113 MG/DL (ref 74–106)
HDLC SERPL-MCNC: 43 MG/DL (ref 40–60)
LDLC SERPL CALC-MCNC: 66 MG/DL
LDLC/HDLC SERPL: 1.5 {RATIO}
NONHDLC SERPL-MCNC: 103 MG/DL
POTASSIUM SERPL-SCNC: 3.8 MMOL/L (ref 3.5–5.1)
PROT SERPL-MCNC: 6.9 G/DL (ref 6.4–8.2)
SODIUM SERPL-SCNC: 140 MMOL/L (ref 136–145)
TRIGL SERPL-MCNC: 184 MG/DL (ref 35–150)
TSH SERPL DL<=0.005 MIU/L-ACNC: 2.34 UIU/ML (ref 0.36–3.74)
VLDLC SERPL-MCNC: 37 MG/DL

## 2023-12-18 PROCEDURE — 80061 LIPID PANEL: CPT | Mod: ,,, | Performed by: CLINICAL MEDICAL LABORATORY

## 2023-12-18 PROCEDURE — 3008F BODY MASS INDEX DOCD: CPT | Mod: ,,, | Performed by: NURSE PRACTITIONER

## 2023-12-18 PROCEDURE — 84443 TSH: ICD-10-PCS | Mod: ,,, | Performed by: CLINICAL MEDICAL LABORATORY

## 2023-12-18 PROCEDURE — 80053 COMPREHENSIVE METABOLIC PANEL: ICD-10-PCS | Mod: ,,, | Performed by: CLINICAL MEDICAL LABORATORY

## 2023-12-18 PROCEDURE — 1101F PT FALLS ASSESS-DOCD LE1/YR: CPT | Mod: ,,, | Performed by: NURSE PRACTITIONER

## 2023-12-18 PROCEDURE — 84443 ASSAY THYROID STIM HORMONE: CPT | Mod: ,,, | Performed by: CLINICAL MEDICAL LABORATORY

## 2023-12-18 PROCEDURE — 3008F PR BODY MASS INDEX (BMI) DOCUMENTED: ICD-10-PCS | Mod: ,,, | Performed by: NURSE PRACTITIONER

## 2023-12-18 PROCEDURE — 1159F PR MEDICATION LIST DOCUMENTED IN MEDICAL RECORD: ICD-10-PCS | Mod: ,,, | Performed by: NURSE PRACTITIONER

## 2023-12-18 PROCEDURE — 3061F NEG MICROALBUMINURIA REV: CPT | Mod: ,,, | Performed by: NURSE PRACTITIONER

## 2023-12-18 PROCEDURE — 3288F PR FALLS RISK ASSESSMENT DOCUMENTED: ICD-10-PCS | Mod: ,,, | Performed by: NURSE PRACTITIONER

## 2023-12-18 PROCEDURE — 3061F PR NEG MICROALBUMINURIA RESULT DOCUMENTED/REVIEW: ICD-10-PCS | Mod: ,,, | Performed by: NURSE PRACTITIONER

## 2023-12-18 PROCEDURE — 1160F RVW MEDS BY RX/DR IN RCRD: CPT | Mod: ,,, | Performed by: NURSE PRACTITIONER

## 2023-12-18 PROCEDURE — 3075F SYST BP GE 130 - 139MM HG: CPT | Mod: ,,, | Performed by: NURSE PRACTITIONER

## 2023-12-18 PROCEDURE — 99214 OFFICE O/P EST MOD 30 MIN: CPT | Mod: ,,, | Performed by: NURSE PRACTITIONER

## 2023-12-18 PROCEDURE — 3066F PR DOCUMENTATION OF TREATMENT FOR NEPHROPATHY: ICD-10-PCS | Mod: ,,, | Performed by: NURSE PRACTITIONER

## 2023-12-18 PROCEDURE — 3075F PR MOST RECENT SYSTOLIC BLOOD PRESS GE 130-139MM HG: ICD-10-PCS | Mod: ,,, | Performed by: NURSE PRACTITIONER

## 2023-12-18 PROCEDURE — 1159F MED LIST DOCD IN RCRD: CPT | Mod: ,,, | Performed by: NURSE PRACTITIONER

## 2023-12-18 PROCEDURE — 1101F PR PT FALLS ASSESS DOC 0-1 FALLS W/OUT INJ PAST YR: ICD-10-PCS | Mod: ,,, | Performed by: NURSE PRACTITIONER

## 2023-12-18 PROCEDURE — 80061 LIPID PANEL: ICD-10-PCS | Mod: ,,, | Performed by: CLINICAL MEDICAL LABORATORY

## 2023-12-18 PROCEDURE — 3078F PR MOST RECENT DIASTOLIC BLOOD PRESSURE < 80 MM HG: ICD-10-PCS | Mod: ,,, | Performed by: NURSE PRACTITIONER

## 2023-12-18 PROCEDURE — 3078F DIAST BP <80 MM HG: CPT | Mod: ,,, | Performed by: NURSE PRACTITIONER

## 2023-12-18 PROCEDURE — 80053 COMPREHEN METABOLIC PANEL: CPT | Mod: ,,, | Performed by: CLINICAL MEDICAL LABORATORY

## 2023-12-18 PROCEDURE — 1126F PR PAIN SEVERITY QUANTIFIED, NO PAIN PRESENT: ICD-10-PCS | Mod: ,,, | Performed by: NURSE PRACTITIONER

## 2023-12-18 PROCEDURE — 99214 PR OFFICE/OUTPT VISIT, EST, LEVL IV, 30-39 MIN: ICD-10-PCS | Mod: ,,, | Performed by: NURSE PRACTITIONER

## 2023-12-18 PROCEDURE — 3044F HG A1C LEVEL LT 7.0%: CPT | Mod: ,,, | Performed by: NURSE PRACTITIONER

## 2023-12-18 PROCEDURE — 3066F NEPHROPATHY DOC TX: CPT | Mod: ,,, | Performed by: NURSE PRACTITIONER

## 2023-12-18 PROCEDURE — 3288F FALL RISK ASSESSMENT DOCD: CPT | Mod: ,,, | Performed by: NURSE PRACTITIONER

## 2023-12-18 PROCEDURE — 1160F PR REVIEW ALL MEDS BY PRESCRIBER/CLIN PHARMACIST DOCUMENTED: ICD-10-PCS | Mod: ,,, | Performed by: NURSE PRACTITIONER

## 2023-12-18 PROCEDURE — 1126F AMNT PAIN NOTED NONE PRSNT: CPT | Mod: ,,, | Performed by: NURSE PRACTITIONER

## 2023-12-18 PROCEDURE — 3044F PR MOST RECENT HEMOGLOBIN A1C LEVEL <7.0%: ICD-10-PCS | Mod: ,,, | Performed by: NURSE PRACTITIONER

## 2023-12-18 RX ORDER — SUMATRIPTAN SUCCINATE 6 MG/.5ML
0.5 INJECTION, SOLUTION SUBCUTANEOUS 2 TIMES DAILY PRN
Qty: 9 EACH | Refills: 5 | Status: SHIPPED | OUTPATIENT
Start: 2023-12-18

## 2023-12-18 RX ORDER — PROMETHAZINE HYDROCHLORIDE 25 MG/1
50 TABLET ORAL EVERY 4 HOURS PRN
Qty: 20 TABLET | Refills: 1 | Status: SHIPPED | OUTPATIENT
Start: 2023-12-18

## 2023-12-18 NOTE — PROGRESS NOTES
[facility] - FAMILY MEDICINE       PATIENT NAME: Dominic Casas   : 1955    AGE: 68 y.o. DATE OF ENCOUNTER: 23    MRN: [id number]      PCP: Pita Ramos FNP    Reason for Visit / Chief Complaint:  Follow-up (Patient presents to the clinic for 6m f/u htn hypothroidism), Hypothyroidism, and Hypertension         274}    Subjective:     HPI:    Presents for 6-mth f/u HTN, hypothyroidism, and HLD.    Bottom of feet burn occasionally at night, uses topical spray which helps.  Denies pain with ambulation or tingling.    Review of Systems:   Review of Systems   Constitutional:  Negative for activity change and unexpected weight change.   HENT:  Negative for hearing loss, rhinorrhea and trouble swallowing.    Eyes:  Negative for discharge and visual disturbance.   Respiratory:  Negative for chest tightness, shortness of breath and wheezing.    Cardiovascular:  Negative for chest pain and palpitations.   Gastrointestinal:  Negative for blood in stool, constipation, diarrhea and vomiting.   Endocrine: Negative for polydipsia and polyuria.   Genitourinary:  Negative for difficulty urinating, dysuria, hematuria and menstrual problem.   Musculoskeletal:  Positive for arthralgias (chronic). Negative for joint swelling and neck pain.   Skin: Negative.    Neurological:  Negative for weakness and headaches (chronic).   Psychiatric/Behavioral:  Positive for sleep disturbance. Negative for confusion and dysphoric mood.      Allergies and Meds: 274}     Review of patient's allergies indicates:   Allergen Reactions    Codeine Hives and Itching        Current Outpatient Medications:     atorvastatin (LIPITOR) 20 MG tablet, Take 1 tablet (20 mg total) by mouth once daily., Disp: 90 tablet, Rfl: 3    levothyroxine (SYNTHROID) 88 MCG tablet, Take 1 tablet (88 mcg total) by mouth before breakfast., Disp: 90 tablet, Rfl: 3    losartan-hydrochlorothiazide 100-25 mg (HYZAAR) 100-25 mg per tablet, Take 1  tablet by mouth once daily., Disp: 90 tablet, Rfl: 1    sumatriptan (IMITREX) 100 MG tablet, Take 1 tablet (100 mg total) by mouth every 2 (two) hours as needed for Migraine. May repeat dose in 2 hrs once in 24 hours.  Max dosage 200 mg in 24 hours. (Patient taking differently: Take 50 mg by mouth every 2 (two) hours as needed for Migraine. May repeat dose in 2 hrs once in 24 hours.  Max dosage 200 mg in 24 hours.), Disp: 27 tablet, Rfl: 3    promethazine (PHENERGAN) 25 MG tablet, Take 2 tablets (50 mg total) by mouth every 4 (four) hours as needed for Nausea. 1-2 tablet every 4 hours as needed, Disp: 20 tablet, Rfl: 1    SUMAtriptan succinate 6 mg/0.5 mL Crtg, Inject 0.5 mLs into the skin 2 (two) times daily as needed (HA). Take 1 injection at onset of migraine and can repeat in 1-2 hours if needed. No more than 2 doses in 24 hours., Disp: 9 each, Rfl: 5    Labs:274}   I have reviewed labs below:  Lab Results   Component Value Date    WBC 5.14 06/08/2023    RBC 4.58 06/08/2023    HGB 12.6 06/08/2023    HCT 38.6 06/08/2023     06/08/2023     06/08/2023    K 3.8 06/08/2023     06/08/2023    CALCIUM 9.8 06/08/2023     (H) 06/08/2023    BUN 19 (H) 06/08/2023    CREATININE 0.98 06/08/2023    EGFRNONAA 56 (L) 06/06/2022    ALT 41 06/08/2023    AST 29 06/08/2023    CHOL 133 06/08/2023    TRIG 208 (H) 06/08/2023    HDL 41 06/08/2023    LDLCALC 50 06/08/2023    TSH 2.260 06/08/2023    HGBA1C 5.6 06/08/2023    MICROALBUR <0.5 06/08/2023       Medical History: 274}     Past Medical History:   Diagnosis Date    Cholelithiasis     4/26/21 per US (report scanned in); HIDA scan 5/7/21 normal w/ EF49% (scanned in)    Chronic migraine without aura or status migrainosus     Hyperlipidemia     Hypertension     Hypothyroidism     Lumbar degenerative disc disease     Osteoarthritis of multiple joints     Spondylosis of cervical spine     Spondylosis, lumbosacral       Social History     Tobacco Use   Smoking  "Status Never   Smokeless Tobacco Never      Past Surgical History:   Procedure Laterality Date    PARTIAL KNEE ARTHROPLASTY Right 06/2017    Dr. Jacobs    SHOULDER ARTHROSCOPY Left 07/2012    left rotator cuff repair    TOTAL HIP ARTHROPLASTY Left 09/2017    TOTAL HIP ARTHROPLASTY Right 10/07/2019    Dr. Jacobs [place]      Health Maintenance: 274}     Health Maintenance         Date Due Completion Date    RSV Vaccine (Age 60+ and Pregnant patients) (1 - 1-dose 60+ series) Never done ---    TETANUS VACCINE 08/14/2023 8/14/2013    COVID-19 Vaccine (4 - 2023-24 season) 09/01/2023 10/27/2021    Colorectal Cancer Screening 06/02/2024 6/2/2021    Override on 6/2/2021: Done (Negative. Repeat 3 years. Scanned into docments.)    Lipid Panel 06/08/2024 6/8/2023    Override on 11/19/2020: Done    DEXA Scan 07/01/2024 7/1/2021    Override on 12/29/2010: Done    Mammogram 07/12/2024 7/12/2023    Override on 2/15/2016: Done ([facility]- Negative. Repeat yearly. Scanned into documents.)    Pap Smear 04/25/2025 4/25/2022    Override on 8/2/2016: Done (Dr. Arrington- Negative. Scanned into documents.)    Hemoglobin A1c (Diabetic Prevention Screening) 06/08/2026 6/8/2023          Immunization History   Administered Date(s) Administered    COVID-19 MRNA, LN-S PF (MODERNA HALF 0.25 ML DOSE) 10/27/2021    COVID-19, MRNA, LN-S, PF (MODERNA FULL 0.5 ML DOSE) 02/01/2021, 03/01/2021    Influenza 10/01/2020, 11/11/2021, 11/09/2022    Influenza - High Dose - PF (65 years and older) 11/11/2021, 11/09/2023    Pneumococcal Conjugate - 20 Valent 12/07/2022    Pneumococcal Polysaccharide - 23 Valent 10/30/2019, 12/16/2020    Tdap 08/14/2013    Zoster 06/21/2021    Zoster Recombinant 10/30/2019, 12/02/2021     Objective:  274}   /79 (BP Location: Right arm, Patient Position: Sitting, BP Method: X-Large (Automatic))   Pulse 65   Temp 98.5 °F (36.9 °C) (Oral)   Resp 20   Ht 5' 8" (1.727 m)   Wt 110.2 kg (243 lb)   SpO2 95%   BMI 36.95 kg/m²     Wt " Readings from Last 3 Encounters:   12/18/23 110.2 kg (243 lb)   10/17/23 112.5 kg (248 lb)   07/12/23 108.9 kg (240 lb)     BP Readings from Last 3 Encounters:   12/18/23 138/79   10/17/23 120/77   06/08/23 124/82     Body mass index is 36.95 kg/m².     Physical Exam  Vitals and nursing note reviewed.   Constitutional:       General: She is not in acute distress.     Appearance: Normal appearance.   HENT:      Head: Normocephalic.      Right Ear: Tympanic membrane, ear canal and external ear normal.      Left Ear: Tympanic membrane, ear canal and external ear normal.      Nose: Nose normal.      Mouth/Throat:      Mouth: Mucous membranes are moist.      Pharynx: Oropharynx is clear. Uvula midline. No posterior oropharyngeal erythema or uvula swelling.   Eyes:      Conjunctiva/sclera: Conjunctivae normal.      Pupils: Pupils are equal, round, and reactive to light.   Neck:      Thyroid: No thyroid mass or thyromegaly.      Vascular: Normal carotid pulses. No carotid bruit.   Cardiovascular:      Rate and Rhythm: Normal rate and regular rhythm.      Pulses: Normal pulses.      Heart sounds: Normal heart sounds.   Pulmonary:      Effort: Pulmonary effort is normal.      Breath sounds: Normal breath sounds.   Abdominal:      Palpations: Abdomen is soft.      Tenderness: There is no abdominal tenderness.   Musculoskeletal:      Cervical back: Neck supple.      Right lower leg: No edema.      Left lower leg: No edema.   Lymphadenopathy:      Cervical: No cervical adenopathy.   Skin:     General: Skin is warm and dry.   Neurological:      General: No focal deficit present.      Mental Status: She is alert and oriented to person, place, and time.   Psychiatric:         Mood and Affect: Mood normal.         Behavior: Behavior normal.          Assessment and Plan: 274}     1. Essential (primary) hypertension  Comments:  Controlled  Continue losartan hydrochlorothiazide 100/25 mg daily.    2. Hypothyroidism  (acquired)  Comments:  Controlled  Check TSH today.    Continue levothyroxine and adjust if indicated.  Orders:  -     TSH; Future; Expected date: 12/18/2023    3. Mixed hyperlipidemia  Comments:  Trigs elevated on previous labs, recheck lipids today.    Continue atorvastatin 20 mg daily and adjust if indicated.  Orders:  -     Comprehensive Metabolic Panel; Future; Expected date: 12/18/2023  -     Lipid Panel; Future; Expected date: 12/18/2023    4. Encounter for long-term (current) use of other medications  -     Comprehensive Metabolic Panel; Future; Expected date: 12/18/2023    5. Chronic migraine without aura without status migrainosus, not intractable  -     SUMAtriptan succinate 6 mg/0.5 mL Crtg; Inject 0.5 mLs into the skin 2 (two) times daily as needed (HA). Take 1 injection at onset of migraine and can repeat in 1-2 hours if needed. No more than 2 doses in 24 hours.  Dispense: 9 each; Refill: 5  -     promethazine (PHENERGAN) 25 MG tablet; Take 2 tablets (50 mg total) by mouth every 4 (four) hours as needed for Nausea. 1-2 tablet every 4 hours as needed  Dispense: 20 tablet; Refill: 1       Return to clinic 6-mth f/u HTN and hypothyroidism; and sooner as needed.    Future Appointments   Date Time Provider Department Center   1/11/2024 10:00 AM AWBILLY NURSE, [facility] FAMILY MEDICINE [facility] [place]   6/19/2024 11:00 AM Pita Ramos FNP [facility] [place]   7/18/2024 10:15 AM [facility] [place]        Signature:  NELI Badillo

## 2023-12-20 NOTE — PROGRESS NOTES
Call pt and review results.  Very mild elevation of FPG but it was too soon to recheck A1c since last A1c was normal 5.6% June 2023.  We will check A1c again next visit.  Trigs are improving slightly, HDL up 2 points, LDL well controlled with atorvastatin.  Thyroid function is perfect on 88 mcg daily.

## 2023-12-31 ENCOUNTER — EXTERNAL CHRONIC CARE MANAGEMENT (OUTPATIENT)
Dept: FAMILY MEDICINE | Facility: CLINIC | Age: 68
End: 2023-12-31
Payer: MEDICARE

## 2023-12-31 PROCEDURE — G0511 CCM/BHI BY RHC/FQHC 20MIN MO: HCPCS | Mod: ,,, | Performed by: NURSE PRACTITIONER

## 2024-01-02 ENCOUNTER — OFFICE VISIT (OUTPATIENT)
Dept: FAMILY MEDICINE | Facility: CLINIC | Age: 69
End: 2024-01-02
Payer: MEDICARE

## 2024-01-02 VITALS
WEIGHT: 244.63 LBS | TEMPERATURE: 98 F | SYSTOLIC BLOOD PRESSURE: 124 MMHG | RESPIRATION RATE: 20 BRPM | DIASTOLIC BLOOD PRESSURE: 76 MMHG | BODY MASS INDEX: 37.07 KG/M2 | OXYGEN SATURATION: 95 % | HEART RATE: 95 BPM | HEIGHT: 68 IN

## 2024-01-02 DIAGNOSIS — I10 ESSENTIAL (PRIMARY) HYPERTENSION: Chronic | ICD-10-CM

## 2024-01-02 DIAGNOSIS — J10.1 INFLUENZA A: Primary | ICD-10-CM

## 2024-01-02 DIAGNOSIS — E66.01 SEVERE OBESITY (BMI 35.0-39.9) WITH COMORBIDITY: Chronic | ICD-10-CM

## 2024-01-02 PROCEDURE — 3288F FALL RISK ASSESSMENT DOCD: CPT | Mod: ,,, | Performed by: NURSE PRACTITIONER

## 2024-01-02 PROCEDURE — 99214 OFFICE O/P EST MOD 30 MIN: CPT | Mod: ,,, | Performed by: NURSE PRACTITIONER

## 2024-01-02 PROCEDURE — 3074F SYST BP LT 130 MM HG: CPT | Mod: ,,, | Performed by: NURSE PRACTITIONER

## 2024-01-02 PROCEDURE — 3078F DIAST BP <80 MM HG: CPT | Mod: ,,, | Performed by: NURSE PRACTITIONER

## 2024-01-02 PROCEDURE — 1101F PT FALLS ASSESS-DOCD LE1/YR: CPT | Mod: ,,, | Performed by: NURSE PRACTITIONER

## 2024-01-02 PROCEDURE — 1159F MED LIST DOCD IN RCRD: CPT | Mod: ,,, | Performed by: NURSE PRACTITIONER

## 2024-01-02 PROCEDURE — 1125F AMNT PAIN NOTED PAIN PRSNT: CPT | Mod: ,,, | Performed by: NURSE PRACTITIONER

## 2024-01-02 PROCEDURE — 3008F BODY MASS INDEX DOCD: CPT | Mod: ,,, | Performed by: NURSE PRACTITIONER

## 2024-01-02 PROCEDURE — 1160F RVW MEDS BY RX/DR IN RCRD: CPT | Mod: ,,, | Performed by: NURSE PRACTITIONER

## 2024-01-02 RX ORDER — OSELTAMIVIR PHOSPHATE 75 MG/1
75 CAPSULE ORAL 2 TIMES DAILY
Qty: 10 CAPSULE | Refills: 0 | Status: SHIPPED | OUTPATIENT
Start: 2024-01-02 | End: 2024-01-11 | Stop reason: ALTCHOICE

## 2024-01-02 NOTE — PROGRESS NOTES
[facility] - FAMILY MEDICINE       PATIENT NAME: Dominic Casas   : 1955    AGE: 68 y.o. DATE OF ENCOUNTER: 24    MRN: [id number]      PCP: Pita Ramos FNP    Reason for Visit / Chief Complaint:  Generalized Body Aches, Cough, Nasal Congestion, URI (Patient presents to the clinic for uri/Patient taking mucinex, dayquil, nyquil, theraflu. ), and Headache         274}    Subjective:     HPI:    Presents for upper resp s/s w/ cough x 2 days.  Grandkids are sick.  Deep, nonproductive cough.  Denies SOB.    Review of Systems:   Review of Systems   Constitutional:  Positive for chills and fatigue. Negative for fever.   HENT:  Positive for congestion, postnasal drip, rhinorrhea, sinus pressure, sinus pain, sneezing and sore throat. Negative for ear pain.    Respiratory:  Positive for cough and wheezing. Negative for shortness of breath.    Cardiovascular: Negative.    Gastrointestinal: Negative.    Musculoskeletal:  Positive for myalgias.   Skin: Negative.    Neurological:  Positive for headaches.       Allergies and Meds: 274}     Review of patient's allergies indicates:   Allergen Reactions    Codeine Hives and Itching        Current Outpatient Medications:     atorvastatin (LIPITOR) 20 MG tablet, Take 1 tablet (20 mg total) by mouth once daily., Disp: 90 tablet, Rfl: 3    levothyroxine (SYNTHROID) 88 MCG tablet, Take 1 tablet (88 mcg total) by mouth before breakfast., Disp: 90 tablet, Rfl: 3    losartan-hydrochlorothiazide 100-25 mg (HYZAAR) 100-25 mg per tablet, Take 1 tablet by mouth once daily., Disp: 90 tablet, Rfl: 1    promethazine (PHENERGAN) 25 MG tablet, Take 2 tablets (50 mg total) by mouth every 4 (four) hours as needed for Nausea. 1-2 tablet every 4 hours as needed, Disp: 20 tablet, Rfl: 1    sumatriptan (IMITREX) 100 MG tablet, Take 1 tablet (100 mg total) by mouth every 2 (two) hours as needed for Migraine. May repeat dose in 2 hrs once in 24 hours.  Max dosage 200 mg  in 24 hours. (Patient taking differently: Take 50 mg by mouth every 2 (two) hours as needed for Migraine. May repeat dose in 2 hrs once in 24 hours.  Max dosage 200 mg in 24 hours.), Disp: 27 tablet, Rfl: 3    SUMAtriptan succinate 6 mg/0.5 mL Crtg, Inject 0.5 mLs into the skin 2 (two) times daily as needed (HA). Take 1 injection at onset of migraine and can repeat in 1-2 hours if needed. No more than 2 doses in 24 hours., Disp: 9 each, Rfl: 5    oseltamivir (TAMIFLU) 75 MG capsule, Take 1 capsule (75 mg total) by mouth 2 (two) times daily. for 5 days, Disp: 10 capsule, Rfl: 0    Labs:274}   I have reviewed labs below:  Lab Results   Component Value Date    WBC 5.14 06/08/2023    RBC 4.58 06/08/2023    HGB 12.6 06/08/2023    HCT 38.6 06/08/2023     06/08/2023     12/18/2023    K 3.8 12/18/2023     12/18/2023    CALCIUM 9.6 12/18/2023     (H) 12/18/2023    BUN 17 12/18/2023    CREATININE 0.96 12/18/2023    EGFRNONAA 56 (L) 06/06/2022    ALT 44 12/18/2023    AST 28 12/18/2023    CHOL 146 12/18/2023    TRIG 184 (H) 12/18/2023    HDL 43 12/18/2023    LDLCALC 66 12/18/2023    TSH 2.340 12/18/2023    HGBA1C 5.6 06/08/2023    MICROALBUR <0.5 06/08/2023       Medical History: 274}     Past Medical History:   Diagnosis Date    Cholelithiasis     4/26/21 per US (report scanned in); HIDA scan 5/7/21 normal w/ EF49% (scanned in)    Chronic migraine without aura or status migrainosus     Hyperlipidemia     Hypertension     Hypothyroidism     Lumbar degenerative disc disease     Osteoarthritis of multiple joints     Spondylosis of cervical spine     Spondylosis, lumbosacral       Social History     Tobacco Use   Smoking Status Never   Smokeless Tobacco Never      Past Surgical History:   Procedure Laterality Date    PARTIAL KNEE ARTHROPLASTY Right 06/2017    Dr. Jacobs    SHOULDER ARTHROSCOPY Left 07/2012    left rotator cuff repair    TOTAL HIP ARTHROPLASTY Left 09/2017    TOTAL HIP ARTHROPLASTY Right  "10/07/2019    Dr[place] Jacobs [place]        Health Maintenance: 274}     Health Maintenance         Date Due Completion Date    TETANUS VACCINE 08/14/2023 8/14/2013    COVID-19 Vaccine (4 - 2023-24 season) 09/01/2023 10/27/2021    Colorectal Cancer Screening 06/02/2024 6/2/2021    Override on 6/2/2021: Done (Negative. Repeat 3 years. Scanned into docments.)    DEXA Scan 07/01/2024 7/1/2021    Override on 12/29/2010: Done    Mammogram 07/12/2024 7/12/2023    Override on 2/15/2016: Done ([facility]- Negative. Repeat yearly. Scanned into documents.)    Lipid Panel 12/18/2024 12/18/2023    Override on 11/19/2020: Done    Pap Smear 04/25/2025 4/25/2022    Override on 8/2/2016: Done (Dr. Arrington- Negative. Scanned into documents.)    Hemoglobin A1c (Diabetic Prevention Screening) 06/08/2026 6/8/2023          Immunization History   Administered Date(s) Administered    COVID-19 MRNA, LN-S PF (MODERNA HALF 0.25 ML DOSE) 10/27/2021    COVID-19, MRNA, LN-S, PF (MODERNA FULL 0.5 ML DOSE) 02/01/2021, 03/01/2021    Influenza 10/01/2020, 11/11/2021, 11/09/2022    Influenza - High Dose - PF (65 years and older) 11/11/2021, 11/09/2023    Pneumococcal Conjugate - 20 Valent 12/07/2022    Pneumococcal Polysaccharide - 23 Valent 10/30/2019, 12/16/2020    RSVpreF (Arexvy) 12/20/2023    Tdap 08/14/2013    Zoster 06/21/2021    Zoster Recombinant 10/30/2019, 12/02/2021     Objective:  274}   /76   Pulse 95   Temp 97.5 °F (36.4 °C) (Oral)   Resp 20   Ht 5' 8" (1.727 m)   Wt 110.9 kg (244 lb 9.6 oz)   SpO2 95%   BMI 37.19 kg/m²     Wt Readings from Last 3 Encounters:   01/02/24 110.9 kg (244 lb 9.6 oz)   12/18/23 110.2 kg (243 lb)   10/17/23 112.5 kg (248 lb)     BP Readings from Last 3 Encounters:   01/02/24 124/76   12/18/23 138/79   10/17/23 120/77     Body mass index is 37.19 kg/m².     Physical Exam  Vitals and nursing note reviewed.   Constitutional:       General: She is not in acute distress.     Appearance: Normal appearance. She is " ill-appearing. She is not toxic-appearing or diaphoretic.   HENT:      Head: Normocephalic.      Right Ear: Hearing, tympanic membrane, ear canal and external ear normal.      Left Ear: Hearing, tympanic membrane, ear canal and external ear normal.      Nose: Mucosal edema, congestion and rhinorrhea present. Rhinorrhea is clear.      Right Turbinates: Swollen.      Left Turbinates: Swollen.      Right Sinus: No maxillary sinus tenderness or frontal sinus tenderness.      Left Sinus: No maxillary sinus tenderness or frontal sinus tenderness.      Mouth/Throat:      Lips: Pink.      Mouth: Mucous membranes are moist.      Tongue: No lesions.      Pharynx: Uvula midline. No pharyngeal swelling, oropharyngeal exudate, posterior oropharyngeal erythema or uvula swelling.   Eyes:      Conjunctiva/sclera: Conjunctivae normal.      Pupils: Pupils are equal, round, and reactive to light.   Cardiovascular:      Rate and Rhythm: Normal rate and regular rhythm.      Heart sounds: Normal heart sounds.   Pulmonary:      Effort: Pulmonary effort is normal. No respiratory distress.      Breath sounds: Normal breath sounds. No wheezing, rhonchi or rales.   Musculoskeletal:      Cervical back: No rigidity.   Lymphadenopathy:      Cervical: No cervical adenopathy.   Skin:     General: Skin is warm and dry.      Coloration: Skin is not jaundiced or pale.      Findings: No rash.   Neurological:      General: No focal deficit present.      Mental Status: She is alert and oriented to person, place, and time.   Psychiatric:         Mood and Affect: Mood normal.         Behavior: Behavior normal.         Thought Content: Thought content normal.         Judgment: Judgment normal.          Assessment and Plan: 274}     1. Influenza A  -     POCT COVID-19 Rapid Screening  -     POCT Influenza A/B  -     oseltamivir (TAMIFLU) 75 MG capsule; Take 1 capsule (75 mg total) by mouth 2 (two) times daily. for 5 days  Dispense: 10 capsule; Refill:  0    2. Severe obesity (BMI 35.0-39.9) with comorbidity  Comments:  healthy diet, exercise, and weight loss recommended    3. Essential (primary) hypertension  Comments:  Well controlled, continue current treatment.       Antiviral treatment with Tamiflu, rest, hydrate, and supportive care.  Advised to monitor for symptoms of secondary bacterial infection and call if any questions or concerns.    Return to clinic if s/s persist or worsen.    Future Appointments   Date Time Provider Department Center   1/11/2024 10:00 AM AWBILLY NURSE, [facility] FAMILY MEDICINE [facility] STEPHANIE [place]   6/19/2024 11:00 AM Pita Ramos FNP [facility] STE[facility]ANIE [place]   7/18/2024 10:15 AM [facility] [place]        Signature:  NELI Badillo

## 2024-01-04 NOTE — PROGRESS NOTES
"[facility] MEDICINE       PATIENT NAME: Dominic Casas   : 1955    AGE: 68 y.o. DATE OF ENCOUNTER: 24    MRN: [id number]      Dominic Casas presented for a  Medicare AWV and comprehensive Health Risk Assessment today. The following components were reviewed and updated:    Medical history  Family History  Social history  Allergies and Current Medications  Health Risk Assessment  Health Maintenance  Care Team         ** See Completed Assessments for Annual Wellness Visit within the encounter summary.**         The following assessments were completed:  Living Situation  CAGE  Depression Screening  Timed Get Up and Go  Whisper Test  Cognitive Function Screening  Nutrition Screening  ADL Screening  PAQ Screening        Vitals:    24 1007   BP: 121/84   BP Location: Right arm   Patient Position: Sitting   Pulse: 84   Resp: 16   Temp: 99.5 °F (37.5 °C)   TempSrc: Oral   SpO2: 96%   Weight: 108.9 kg (240 lb)   Height: 5' 8" (1.727 m)     Body mass index is 36.49 kg/m².  Physical Exam  Vitals and nursing note reviewed.   Constitutional:       General: She is not in acute distress.     Appearance: Normal appearance. She is not ill-appearing.   HENT:      Head: Normocephalic.   Eyes:      Conjunctiva/sclera: Conjunctivae normal.   Cardiovascular:      Rate and Rhythm: Normal rate and regular rhythm.      Heart sounds: Normal heart sounds.   Pulmonary:      Effort: Pulmonary effort is normal. No respiratory distress.      Breath sounds: Wheezing and rhonchi present. No rales.   Skin:     General: Skin is warm and dry.      Coloration: Skin is not jaundiced or pale.   Neurological:      Mental Status: She is alert and oriented to person, place, and time.      Gait: Gait normal.   Psychiatric:         Mood and Affect: Mood normal.         Behavior: Behavior normal.         Thought Content: Thought content normal.         Judgment: Judgment normal.               Diagnoses and health risks " identified today and associated recommendations/orders:    1. Encounter for subsequent annual wellness visit (AWV) in Medicare patient    2. Essential (primary) hypertension  Assessment & Plan:  The current medical regimen is effective;  continue present plan and medications.      3. Mixed hyperlipidemia  Comments:  Controlled, continue atorvastatin.    4. Hypothyroidism (acquired)  Comments:  Controlled, continue levothyroxine.    5. Need for vaccination  -     diphth,pertus,acell,,tetanus (BOOSTRIX) 2.5-8-5 Lf-mcg-Lf/0.5mL Susp; Inject 0.5 mLs into the muscle once. for 1 dose  Dispense: 0.5 mL; Refill: 0    6. BMI 36.0-36.9,adult    7. Severe obesity (BMI 35.0-39.9) with comorbidity  Assessment & Plan:  Body mass index is 36.49 kg/m². Morbid obesity complicates all aspects of disease management from diagnostic modalities to treatment. Weight loss encouraged and health benefits explained to patient.         Other orders  -     albuterol (VENTOLIN HFA) 90 mcg/actuation inhaler; Inhale 2 puffs into the lungs every 4 (four) hours as needed for Wheezing or Shortness of Breath. Rescue  Dispense: 18 g; Refill: 1       Provided Dominic with a 5-10 year written screening schedule and personal prevention plan. Recommendations were developed using the USPSTF age appropriate recommendations. Education, counseling, and referrals were provided as needed. After Visit Summary printed and given to patient which includes a list of additional screenings\tests needed.    Follow up in about 1 year (around 1/11/2025).    NELI Badillo    Covid vaccine - declined, Tetanus vaccine - order sent to pharmacy this visit.    I offered to discuss advanced care planning, including how to pick a person who would make decisions for you if you were unable to make them for yourself, called a health care power of , and what kind of decisions you might make such as use of life sustaining treatments such as ventilators and tube  feeding when faced with a life limiting illness recorded on a living will that they will need to know. (How you want to be cared for as you near the end of your natural life)     X Patient is interested in learning more about how to make advanced directives.  I provided them paperwork and offered to discuss this with them.

## 2024-01-05 ENCOUNTER — TELEPHONE (OUTPATIENT)
Dept: FAMILY MEDICINE | Facility: CLINIC | Age: 69
End: 2024-01-05
Payer: MEDICARE

## 2024-01-10 ENCOUNTER — TELEPHONE (OUTPATIENT)
Dept: FAMILY MEDICINE | Facility: CLINIC | Age: 69
End: 2024-01-10
Payer: MEDICARE

## 2024-01-10 ENCOUNTER — PATIENT MESSAGE (OUTPATIENT)
Dept: FAMILY MEDICINE | Facility: CLINIC | Age: 69
End: 2024-01-10
Payer: MEDICARE

## 2024-01-10 DIAGNOSIS — J40 BRONCHITIS: Primary | ICD-10-CM

## 2024-01-10 RX ORDER — AZITHROMYCIN 250 MG/1
TABLET, FILM COATED ORAL
Qty: 6 TABLET | Refills: 0 | Status: SHIPPED | OUTPATIENT
Start: 2024-01-10 | End: 2024-02-05 | Stop reason: ALTCHOICE

## 2024-01-10 NOTE — TELEPHONE ENCOUNTER
----- Message from Amber Ly sent at 1/10/2024  2:02 PM CST -----  Pt need you to give her a call back  Pt#[phone]

## 2024-01-10 NOTE — TELEPHONE ENCOUNTER
Patient called stating she is still having chest congestion and coughing. Finished Tamiflu on Saturday night.

## 2024-01-10 NOTE — TELEPHONE ENCOUNTER
I sent patient a message in her portal to let her know I was sending a prescription to the pharmacy.  Just double check that she saw my message.

## 2024-01-11 ENCOUNTER — OFFICE VISIT (OUTPATIENT)
Dept: FAMILY MEDICINE | Facility: CLINIC | Age: 69
End: 2024-01-11
Payer: MEDICARE

## 2024-01-11 VITALS
TEMPERATURE: 100 F | HEIGHT: 68 IN | OXYGEN SATURATION: 96 % | HEART RATE: 84 BPM | RESPIRATION RATE: 16 BRPM | SYSTOLIC BLOOD PRESSURE: 121 MMHG | BODY MASS INDEX: 36.37 KG/M2 | DIASTOLIC BLOOD PRESSURE: 84 MMHG | WEIGHT: 240 LBS

## 2024-01-11 DIAGNOSIS — Z00.00 ENCOUNTER FOR SUBSEQUENT ANNUAL WELLNESS VISIT (AWV) IN MEDICARE PATIENT: Primary | ICD-10-CM

## 2024-01-11 DIAGNOSIS — E03.9 HYPOTHYROIDISM (ACQUIRED): Chronic | ICD-10-CM

## 2024-01-11 DIAGNOSIS — Z23 NEED FOR VACCINATION: ICD-10-CM

## 2024-01-11 DIAGNOSIS — I10 ESSENTIAL (PRIMARY) HYPERTENSION: Chronic | ICD-10-CM

## 2024-01-11 DIAGNOSIS — E66.01 SEVERE OBESITY (BMI 35.0-39.9) WITH COMORBIDITY: Chronic | ICD-10-CM

## 2024-01-11 DIAGNOSIS — E78.2 MIXED HYPERLIPIDEMIA: Chronic | ICD-10-CM

## 2024-01-11 PROCEDURE — G0439 PPPS, SUBSEQ VISIT: HCPCS | Mod: ,,, | Performed by: NURSE PRACTITIONER

## 2024-01-11 PROCEDURE — 3079F DIAST BP 80-89 MM HG: CPT | Mod: ,,, | Performed by: NURSE PRACTITIONER

## 2024-01-11 PROCEDURE — 3288F FALL RISK ASSESSMENT DOCD: CPT | Mod: ,,, | Performed by: NURSE PRACTITIONER

## 2024-01-11 PROCEDURE — 1170F FXNL STATUS ASSESSED: CPT | Mod: ,,, | Performed by: NURSE PRACTITIONER

## 2024-01-11 PROCEDURE — 1126F AMNT PAIN NOTED NONE PRSNT: CPT | Mod: ,,, | Performed by: NURSE PRACTITIONER

## 2024-01-11 PROCEDURE — 1160F RVW MEDS BY RX/DR IN RCRD: CPT | Mod: ,,, | Performed by: NURSE PRACTITIONER

## 2024-01-11 PROCEDURE — 1101F PT FALLS ASSESS-DOCD LE1/YR: CPT | Mod: ,,, | Performed by: NURSE PRACTITIONER

## 2024-01-11 PROCEDURE — 1159F MED LIST DOCD IN RCRD: CPT | Mod: ,,, | Performed by: NURSE PRACTITIONER

## 2024-01-11 PROCEDURE — 3074F SYST BP LT 130 MM HG: CPT | Mod: ,,, | Performed by: NURSE PRACTITIONER

## 2024-01-11 RX ORDER — ALBUTEROL SULFATE 90 UG/1
2 AEROSOL, METERED RESPIRATORY (INHALATION) EVERY 4 HOURS PRN
Qty: 18 G | Refills: 1 | Status: SHIPPED | OUTPATIENT
Start: 2024-01-11

## 2024-01-11 NOTE — PATIENT INSTRUCTIONS
Counseling and Referral of Other Preventative  (Italic type indicates deductible and co-insurance are waived)    Patient Name: Dominic Casas  Today's Date: 1/11/2024    Health Maintenance       Date Due Completion Date    TETANUS VACCINE 08/14/2023 8/14/2013    COVID-19 Vaccine (4 - 2023-24 season) 09/01/2023 10/27/2021    Colorectal Cancer Screening 06/02/2024 6/2/2021    Override on 6/2/2021: Done (Negative. Repeat 3 years. Scanned into docments.)    DEXA Scan 07/01/2024 7/1/2021    Override on 12/29/2010: Done    Mammogram 07/12/2024 7/12/2023    Override on 2/15/2016: Done ([facility]- Negative. Repeat yearly. Scanned into documents.)    Lipid Panel 12/18/2024 12/18/2023    Override on 11/19/2020: Done    Pap Smear 04/25/2025 4/25/2022    Override on 8/2/2016: Done (Dr. Arrington- Negative. Scanned into documents.)    Hemoglobin A1c (Diabetic Prevention Screening) 06/08/2026 6/8/2023        No orders of the defined types were placed in this encounter.    The following information is provided to all patients.  This information is to help you find resources for any of the problems found today that may be affecting your health:                  Living healthy guide: ms.gov    Understanding Diabetes: www.diabetes.org      Eating healthy: www.cdc.gov/healthyweight      CDC home safety checklist: www.cdc.gov/steadi/patient.html      Agency on Aging: ms.gov    Alcoholics anonymous (AA): www.aa.org      Physical Activity: www.margarette.nih.gov/lr8mpzn      Tobacco use: ms.gov

## 2024-01-12 NOTE — ASSESSMENT & PLAN NOTE
Body mass index is 36.49 kg/m². Morbid obesity complicates all aspects of disease management from diagnostic modalities to treatment. Weight loss encouraged and health benefits explained to patient.

## 2024-01-31 ENCOUNTER — EXTERNAL CHRONIC CARE MANAGEMENT (OUTPATIENT)
Dept: FAMILY MEDICINE | Facility: CLINIC | Age: 69
End: 2024-01-31
Payer: MEDICARE

## 2024-01-31 PROCEDURE — G0511 CCM/BHI BY RHC/FQHC 20MIN MO: HCPCS | Mod: ,,, | Performed by: NURSE PRACTITIONER

## 2024-02-05 ENCOUNTER — OFFICE VISIT (OUTPATIENT)
Dept: FAMILY MEDICINE | Facility: CLINIC | Age: 69
End: 2024-02-05
Payer: MEDICARE

## 2024-02-05 VITALS
RESPIRATION RATE: 20 BRPM | HEIGHT: 68 IN | SYSTOLIC BLOOD PRESSURE: 119 MMHG | WEIGHT: 242 LBS | HEART RATE: 64 BPM | BODY MASS INDEX: 36.68 KG/M2 | TEMPERATURE: 99 F | DIASTOLIC BLOOD PRESSURE: 77 MMHG | OXYGEN SATURATION: 95 %

## 2024-02-05 DIAGNOSIS — J22 LRTI (LOWER RESPIRATORY TRACT INFECTION): Primary | ICD-10-CM

## 2024-02-05 PROCEDURE — 3078F DIAST BP <80 MM HG: CPT | Mod: ,,, | Performed by: NURSE PRACTITIONER

## 2024-02-05 PROCEDURE — 3074F SYST BP LT 130 MM HG: CPT | Mod: ,,, | Performed by: NURSE PRACTITIONER

## 2024-02-05 PROCEDURE — 1160F RVW MEDS BY RX/DR IN RCRD: CPT | Mod: ,,, | Performed by: NURSE PRACTITIONER

## 2024-02-05 PROCEDURE — 1101F PT FALLS ASSESS-DOCD LE1/YR: CPT | Mod: ,,, | Performed by: NURSE PRACTITIONER

## 2024-02-05 PROCEDURE — 3008F BODY MASS INDEX DOCD: CPT | Mod: ,,, | Performed by: NURSE PRACTITIONER

## 2024-02-05 PROCEDURE — 96372 THER/PROPH/DIAG INJ SC/IM: CPT | Mod: 59,,, | Performed by: NURSE PRACTITIONER

## 2024-02-05 PROCEDURE — 3288F FALL RISK ASSESSMENT DOCD: CPT | Mod: ,,, | Performed by: NURSE PRACTITIONER

## 2024-02-05 PROCEDURE — 1159F MED LIST DOCD IN RCRD: CPT | Mod: ,,, | Performed by: NURSE PRACTITIONER

## 2024-02-05 PROCEDURE — 94640 AIRWAY INHALATION TREATMENT: CPT | Mod: ,,, | Performed by: NURSE PRACTITIONER

## 2024-02-05 PROCEDURE — 99213 OFFICE O/P EST LOW 20 MIN: CPT | Mod: 25,,, | Performed by: NURSE PRACTITIONER

## 2024-02-05 PROCEDURE — 1126F AMNT PAIN NOTED NONE PRSNT: CPT | Mod: ,,, | Performed by: NURSE PRACTITIONER

## 2024-02-05 RX ORDER — DOXYCYCLINE HYCLATE 100 MG
100 TABLET ORAL EVERY 12 HOURS
Qty: 20 TABLET | Refills: 0 | Status: SHIPPED | OUTPATIENT
Start: 2024-02-05 | End: 2024-02-15

## 2024-02-05 RX ORDER — DEXAMETHASONE SODIUM PHOSPHATE 4 MG/ML
6 INJECTION, SOLUTION INTRA-ARTICULAR; INTRALESIONAL; INTRAMUSCULAR; INTRAVENOUS; SOFT TISSUE
Status: COMPLETED | OUTPATIENT
Start: 2024-02-05 | End: 2024-02-05

## 2024-02-05 RX ORDER — CEFTRIAXONE 1 G/1
1 INJECTION, POWDER, FOR SOLUTION INTRAMUSCULAR; INTRAVENOUS
Status: COMPLETED | OUTPATIENT
Start: 2024-02-05 | End: 2024-02-05

## 2024-02-05 RX ORDER — PREDNISONE 20 MG/1
TABLET ORAL
Qty: 9 TABLET | Refills: 0 | Status: SHIPPED | OUTPATIENT
Start: 2024-02-05 | End: 2024-02-15 | Stop reason: SDUPTHER

## 2024-02-05 RX ORDER — IPRATROPIUM BROMIDE AND ALBUTEROL SULFATE 2.5; .5 MG/3ML; MG/3ML
3 SOLUTION RESPIRATORY (INHALATION)
Status: COMPLETED | OUTPATIENT
Start: 2024-02-05 | End: 2024-02-05

## 2024-02-05 RX ORDER — IPRATROPIUM BROMIDE AND ALBUTEROL SULFATE 2.5; .5 MG/3ML; MG/3ML
3 SOLUTION RESPIRATORY (INHALATION) EVERY 6 HOURS PRN
Qty: 75 ML | Refills: 0 | Status: SHIPPED | OUTPATIENT
Start: 2024-02-05 | End: 2024-02-15 | Stop reason: SDUPTHER

## 2024-02-05 RX ADMIN — CEFTRIAXONE 1 G: 1 INJECTION, POWDER, FOR SOLUTION INTRAMUSCULAR; INTRAVENOUS at 10:02

## 2024-02-05 RX ADMIN — IPRATROPIUM BROMIDE AND ALBUTEROL SULFATE 3 ML: 2.5; .5 SOLUTION RESPIRATORY (INHALATION) at 10:02

## 2024-02-05 RX ADMIN — DEXAMETHASONE SODIUM PHOSPHATE 6 MG: 4 INJECTION, SOLUTION INTRA-ARTICULAR; INTRALESIONAL; INTRAMUSCULAR; INTRAVENOUS; SOFT TISSUE at 10:02

## 2024-02-05 NOTE — PROGRESS NOTES
[facility] FAMILY MEDICINE       PATIENT NAME: Dominic Casas   : 1955    AGE: 68 y.o. DATE OF ENCOUNTER: 24   MRN: [id number]      Visit type: WALK-IN  PCP:  Pita Ramos FNP    Reason for Visit / Chief Complaint: Cough (Patient presents to the clinic complaints of coughing and wheezing  )     Subjective:     Presents c/o cough x 5-6 wks since having flu.  Reports IZA has helped a lot with wheezing.  Coughing all night, can't sleep, forceful cough.  Nyquil helps. Completed zpak 3 wks ago.  No past dx of asthma or COPD but prone to allergy induced RAD in past; has a nebulizer at home but no med for it.    Review of Systems:     Review of Systems   Constitutional:  Positive for chills. Negative for fever.   HENT:  Positive for congestion and postnasal drip. Negative for ear discharge and sore throat.    Respiratory:  Positive for cough, shortness of breath and wheezing.    Cardiovascular: Negative.    Gastrointestinal: Negative.    Skin: Negative.    Neurological:  Positive for headaches. Negative for dizziness.     Allergies and Meds:     Review of patient's allergies indicates:   Allergen Reactions    Codeine Hives and Itching        Current Outpatient Medications:     albuterol (VENTOLIN HFA) 90 mcg/actuation inhaler, Inhale 2 puffs into the lungs every 4 (four) hours as needed for Wheezing or Shortness of Breath. Rescue, Disp: 18 g, Rfl: 1    atorvastatin (LIPITOR) 20 MG tablet, Take 1 tablet (20 mg total) by mouth once daily., Disp: 90 tablet, Rfl: 3    levothyroxine (SYNTHROID) 88 MCG tablet, Take 1 tablet (88 mcg total) by mouth before breakfast., Disp: 90 tablet, Rfl: 3    losartan-hydrochlorothiazide 100-25 mg (HYZAAR) 100-25 mg per tablet, Take 1 tablet by mouth once daily., Disp: 90 tablet, Rfl: 1    promethazine (PHENERGAN) 25 MG tablet, Take 2 tablets (50 mg total) by mouth every 4 (four) hours as needed for Nausea. 1-2 tablet every 4 hours as needed, Disp: 20 tablet,  Rfl: 1    sumatriptan (IMITREX) 100 MG tablet, Take 1 tablet (100 mg total) by mouth every 2 (two) hours as needed for Migraine. May repeat dose in 2 hrs once in 24 hours.  Max dosage 200 mg in 24 hours. (Patient taking differently: Take 50 mg by mouth every 2 (two) hours as needed for Migraine. May repeat dose in 2 hrs once in 24 hours.  Max dosage 200 mg in 24 hours.), Disp: 27 tablet, Rfl: 3    SUMAtriptan succinate 6 mg/0.5 mL Crtg, Inject 0.5 mLs into the skin 2 (two) times daily as needed (HA). Take 1 injection at onset of migraine and can repeat in 1-2 hours if needed. No more than 2 doses in 24 hours., Disp: 9 each, Rfl: 5    albuterol-ipratropium (DUO-NEB) 2.5 mg-0.5 mg/3 mL nebulizer solution, Take 3 mLs by nebulization every 6 (six) hours as needed for Wheezing. Rescue, Disp: 75 mL, Rfl: 0    doxycycline (VIBRA-TABS) 100 MG tablet, Take 1 tablet (100 mg total) by mouth every 12 (twelve) hours. for 10 days, Disp: 20 tablet, Rfl: 0    predniSONE (DELTASONE) 20 MG tablet, 1 tablet by mouth BID x 3 days, then 1 tab daily x 3 days, Disp: 9 tablet, Rfl: 0  No current facility-administered medications for this visit.    Medical History:     Past Medical History:   Diagnosis Date    Cholelithiasis     4/26/21 per US (report scanned in); HIDA scan 5/7/21 normal w/ EF49% (scanned in)    Chronic migraine without aura or status migrainosus     Hyperlipidemia     Hypertension     Hypothyroidism     Lumbar degenerative disc disease     Osteoarthritis of multiple joints     Spondylosis of cervical spine     Spondylosis, lumbosacral       Social History     Tobacco Use   Smoking Status Never    Passive exposure: Never   Smokeless Tobacco Never      Past Surgical History:   Procedure Laterality Date    PARTIAL KNEE ARTHROPLASTY Right 06/2017    Dr. Jacobs    SHOULDER ARTHROSCOPY Left 07/2012    left rotator cuff repair    TOTAL HIP ARTHROPLASTY Left 09/2017    TOTAL HIP ARTHROPLASTY Right 10/07/2019    Dr. Jacobs [place]  "     Immunization History   Administered Date(s) Administered    COVID-19 MRNA, LN-S PF (MODERNA HALF 0.25 ML DOSE) 10/27/2021    COVID-19, MRNA, LN-S, PF (MODERNA FULL 0.5 ML DOSE) 02/01/2021, 03/01/2021    Influenza 10/01/2020, 11/11/2021, 11/09/2022    Influenza - High Dose - PF (65 years and older) 11/11/2021, 11/09/2023    Pneumococcal Conjugate - 20 Valent 12/07/2022    Pneumococcal Polysaccharide - 23 Valent 10/30/2019, 12/16/2020    RSVpreF (Arexvy) 12/20/2023    Tdap 08/14/2013    Zoster 06/21/2021    Zoster Recombinant 10/30/2019, 12/02/2021        Objective:     Wt Readings from Last 3 Encounters:   02/05/24 0905 109.8 kg (242 lb)   01/11/24 1007 108.9 kg (240 lb)   01/02/24 1048 110.9 kg (244 lb 9.6 oz)       /77 (BP Location: Right arm, Patient Position: Sitting, BP Method: Large (Automatic))   Pulse 64   Temp 98.5 °F (36.9 °C) (Oral)   Resp 20   Ht 5' 8" (1.727 m)   Wt 109.8 kg (242 lb)   SpO2 95%   BMI 36.80 kg/m²   Body mass index is 36.8 kg/m².     Physical Exam:    Physical Exam  Vitals and nursing note reviewed.   Constitutional:       General: She is not in acute distress.     Appearance: Normal appearance. She is not ill-appearing, toxic-appearing or diaphoretic.   HENT:      Head: Normocephalic.      Right Ear: Tympanic membrane, ear canal and external ear normal.      Left Ear: Tympanic membrane, ear canal and external ear normal.      Nose: Nose normal.      Mouth/Throat:      Mouth: Mucous membranes are moist.      Pharynx: Oropharynx is clear.   Eyes:      Conjunctiva/sclera: Conjunctivae normal.   Neck:      Thyroid: No thyromegaly.      Trachea: Trachea normal.   Cardiovascular:      Rate and Rhythm: Normal rate and regular rhythm.      Pulses: Normal pulses.      Heart sounds: Normal heart sounds.   Pulmonary:      Effort: Pulmonary effort is normal. No respiratory distress.      Breath sounds: Wheezing and rales present.      Comments: Much less wheezing after DuoNeb " treatment in voices improvement in shortness of breath.  Few coarse rales in bilateral bases.  Musculoskeletal:      Cervical back: Neck supple.      Right lower leg: No edema.      Left lower leg: No edema.   Lymphadenopathy:      Cervical: No cervical adenopathy.   Skin:     General: Skin is warm and dry.      Coloration: Skin is not jaundiced or pale.   Neurological:      General: No focal deficit present.      Mental Status: She is alert and oriented to person, place, and time.   Psychiatric:         Mood and Affect: Mood normal.         Behavior: Behavior normal.         Assessment and Plan:        1. LRTI (lower respiratory tract infection)  Comments:  No x-ray coverage in clinic.  Treat for pneumonia.  Start doxy p.o. today, start prednisone a.m.  Do not use albuterol HFA when using DuoNeb.  Orders:  -     albuterol-ipratropium 2.5 mg-0.5 mg/3 mL nebulizer solution 3 mL  -     cefTRIAXone injection 1 g  -     dexAMETHasone injection 6 mg  -     albuterol-ipratropium (DUO-NEB) 2.5 mg-0.5 mg/3 mL nebulizer solution; Take 3 mLs by nebulization every 6 (six) hours as needed for Wheezing. Rescue  Dispense: 75 mL; Refill: 0  -     predniSONE (DELTASONE) 20 MG tablet; 1 tablet by mouth BID x 3 days, then 1 tab daily x 3 days  Dispense: 9 tablet; Refill: 0  -     doxycycline (VIBRA-TABS) 100 MG tablet; Take 1 tablet (100 mg total) by mouth every 12 (twelve) hours. for 10 days  Dispense: 20 tablet; Refill: 0         F/u as needed or if symptoms worsen or persist.  Future Appointments   Date Time Provider Department Center   2/5/2024 12:00 PM Pita Ramos FNP [facility] [place]   6/19/2024 11:00 AM Pita aRmos FNP [facility] [place]   7/18/2024 10:15 AM [facility] [place]   1/16/2025  1:00 PM AW[facility] NURSE, [facility] MEDIC[facility]       Signature: Pita QUINTEROS-BC

## 2024-02-15 ENCOUNTER — TELEPHONE (OUTPATIENT)
Dept: FAMILY MEDICINE | Facility: CLINIC | Age: 69
End: 2024-02-15
Payer: MEDICARE

## 2024-02-15 DIAGNOSIS — R05.1 ACUTE COUGH: Primary | ICD-10-CM

## 2024-02-15 DIAGNOSIS — J40 BRONCHITIS: Primary | ICD-10-CM

## 2024-02-15 DIAGNOSIS — J22 LRTI (LOWER RESPIRATORY TRACT INFECTION): ICD-10-CM

## 2024-02-15 RX ORDER — BUDESONIDE 0.5 MG/2ML
0.5 INHALANT ORAL 2 TIMES DAILY
Qty: 60 ML | Refills: 1 | Status: SHIPPED | OUTPATIENT
Start: 2024-02-15 | End: 2024-02-22

## 2024-02-15 RX ORDER — PREDNISONE 20 MG/1
TABLET ORAL
Qty: 9 TABLET | Refills: 0 | Status: SHIPPED | OUTPATIENT
Start: 2024-02-15 | End: 2024-04-24 | Stop reason: ALTCHOICE

## 2024-02-15 RX ORDER — IPRATROPIUM BROMIDE AND ALBUTEROL SULFATE 2.5; .5 MG/3ML; MG/3ML
3 SOLUTION RESPIRATORY (INHALATION) EVERY 6 HOURS PRN
Qty: 150 ML | Refills: 1 | Status: SHIPPED | OUTPATIENT
Start: 2024-02-15 | End: 2024-04-24 | Stop reason: SDUPTHER

## 2024-02-15 NOTE — TELEPHONE ENCOUNTER
Pt called and still has a deep, harsh cough with wheezing.  Minimal sputum production.  No fever. 10 days doxycycline complete and took zpak prior to that.  Noted difference with short course prednisone.  Needs refill of DuoNeb which helps wheezing.  Lifetime nonsmoker, history.  Chest x-ray today no acute findings but it appears to have chronic changes.    Advised we will try inhaled corticosteroid budesonide twice daily and rinse mouth after use, refill DuoNeb, and under the circumstances another short course of prednisone.  If she does not to improve, we will refer to pulmonology.  Voiced understanding.

## 2024-02-22 DIAGNOSIS — J40 BRONCHITIS: ICD-10-CM

## 2024-02-22 RX ORDER — BUDESONIDE 0.5 MG/2ML
0.5 INHALANT ORAL EVERY 12 HOURS
Qty: 360 ML | Refills: 1 | Status: SHIPPED | OUTPATIENT
Start: 2024-02-22

## 2024-02-29 ENCOUNTER — EXTERNAL CHRONIC CARE MANAGEMENT (OUTPATIENT)
Dept: FAMILY MEDICINE | Facility: CLINIC | Age: 69
End: 2024-02-29
Payer: MEDICARE

## 2024-02-29 PROCEDURE — G0511 CCM/BHI BY RHC/FQHC 20MIN MO: HCPCS | Mod: ,,, | Performed by: NURSE PRACTITIONER

## 2024-03-18 ENCOUNTER — PATIENT MESSAGE (OUTPATIENT)
Dept: ADMINISTRATIVE | Facility: HOSPITAL | Age: 69
End: 2024-03-18

## 2024-03-19 ENCOUNTER — PATIENT OUTREACH (OUTPATIENT)
Dept: ADMINISTRATIVE | Facility: HOSPITAL | Age: 69
End: 2024-03-19

## 2024-03-19 DIAGNOSIS — Z12.11 ENCOUNTER FOR SCREENING FOR MALIGNANT NEOPLASM OF COLON: Primary | ICD-10-CM

## 2024-03-19 DIAGNOSIS — Z12.11 SCREENING FOR MALIGNANT NEOPLASM OF COLON: Primary | ICD-10-CM

## 2024-03-19 NOTE — PROGRESS NOTES
Population Health Chart Review & Patient Outreach Details    Campaign Outreach    Updates Requested / Reviewed:  [x]  Care Team Updated    Health Maintenance Topics Addressed and Outreach Outcomes / Actions Taken:  Colon Cancer Screening [x] Colonoscopy Ordered.

## 2024-03-31 ENCOUNTER — EXTERNAL CHRONIC CARE MANAGEMENT (OUTPATIENT)
Dept: FAMILY MEDICINE | Facility: CLINIC | Age: 69
End: 2024-03-31
Payer: MEDICARE

## 2024-03-31 PROCEDURE — G0511 CCM/BHI BY RHC/FQHC 20MIN MO: HCPCS | Mod: ,,, | Performed by: NURSE PRACTITIONER

## 2024-04-24 ENCOUNTER — OFFICE VISIT (OUTPATIENT)
Dept: FAMILY MEDICINE | Facility: CLINIC | Age: 69
End: 2024-04-24
Payer: MEDICARE

## 2024-04-24 ENCOUNTER — OFFICE VISIT (OUTPATIENT)
Dept: PULMONOLOGY | Facility: CLINIC | Age: 69
End: 2024-04-24
Payer: MEDICARE

## 2024-04-24 VITALS
SYSTOLIC BLOOD PRESSURE: 128 MMHG | OXYGEN SATURATION: 96 % | WEIGHT: 248 LBS | DIASTOLIC BLOOD PRESSURE: 72 MMHG | HEART RATE: 74 BPM | HEIGHT: 68 IN | RESPIRATION RATE: 16 BRPM | BODY MASS INDEX: 37.59 KG/M2

## 2024-04-24 VITALS
WEIGHT: 247 LBS | HEART RATE: 67 BPM | RESPIRATION RATE: 18 BRPM | BODY MASS INDEX: 37.44 KG/M2 | OXYGEN SATURATION: 97 % | SYSTOLIC BLOOD PRESSURE: 137 MMHG | HEIGHT: 68 IN | TEMPERATURE: 98 F | DIASTOLIC BLOOD PRESSURE: 80 MMHG

## 2024-04-24 DIAGNOSIS — I10 ESSENTIAL (PRIMARY) HYPERTENSION: Chronic | ICD-10-CM

## 2024-04-24 DIAGNOSIS — R06.2 WHEEZING: ICD-10-CM

## 2024-04-24 DIAGNOSIS — R60.0 EDEMA OF RIGHT LOWER EXTREMITY: Primary | ICD-10-CM

## 2024-04-24 DIAGNOSIS — J06.9 UPPER RESPIRATORY TRACT INFECTION, UNSPECIFIED TYPE: Primary | ICD-10-CM

## 2024-04-24 DIAGNOSIS — R60.0 EDEMA OF RIGHT LOWER EXTREMITY: ICD-10-CM

## 2024-04-24 DIAGNOSIS — J31.0 CHRONIC RHINITIS: Chronic | ICD-10-CM

## 2024-04-24 LAB
ANION GAP SERPL CALCULATED.3IONS-SCNC: 11 MMOL/L (ref 7–16)
BASOPHILS # BLD AUTO: 0.04 K/UL (ref 0–0.2)
BASOPHILS NFR BLD AUTO: 0.8 % (ref 0–1)
BUN SERPL-MCNC: 16 MG/DL (ref 7–18)
BUN/CREAT SERPL: 17 (ref 6–20)
CALCIUM SERPL-MCNC: 9.7 MG/DL (ref 8.5–10.1)
CHLORIDE SERPL-SCNC: 105 MMOL/L (ref 98–107)
CO2 SERPL-SCNC: 29 MMOL/L (ref 21–32)
CREAT SERPL-MCNC: 0.95 MG/DL (ref 0.55–1.02)
CTP QC/QA: YES
D DIMER PPP FEU-MCNC: 1.51 ΜG/ML (ref 0–0.47)
DIFFERENTIAL METHOD BLD: ABNORMAL
EGFR (NO RACE VARIABLE) (RUSH/TITUS): 65 ML/MIN/1.73M2
EOSINOPHIL # BLD AUTO: 0.58 K/UL (ref 0–0.5)
EOSINOPHIL NFR BLD AUTO: 12.3 % (ref 1–4)
ERYTHROCYTE [DISTWIDTH] IN BLOOD BY AUTOMATED COUNT: 14.3 % (ref 11.5–14.5)
GLUCOSE SERPL-MCNC: 99 MG/DL (ref 74–106)
HCT VFR BLD AUTO: 38.2 % (ref 38–47)
HGB BLD-MCNC: 12 G/DL (ref 12–16)
IMM GRANULOCYTES # BLD AUTO: 0.02 K/UL (ref 0–0.04)
IMM GRANULOCYTES NFR BLD: 0.4 % (ref 0–0.4)
LYMPHOCYTES # BLD AUTO: 0.76 K/UL (ref 1–4.8)
LYMPHOCYTES NFR BLD AUTO: 16.1 % (ref 27–41)
MCH RBC QN AUTO: 27.8 PG (ref 27–31)
MCHC RBC AUTO-ENTMCNC: 31.4 G/DL (ref 32–36)
MCV RBC AUTO: 88.6 FL (ref 80–96)
MOLECULAR STREP A: NEGATIVE
MONOCYTES # BLD AUTO: 0.37 K/UL (ref 0–0.8)
MONOCYTES NFR BLD AUTO: 7.9 % (ref 2–6)
MPC BLD CALC-MCNC: 9.1 FL (ref 9.4–12.4)
NEUTROPHILS # BLD AUTO: 2.94 K/UL (ref 1.8–7.7)
NEUTROPHILS NFR BLD AUTO: 62.5 % (ref 53–65)
NRBC # BLD AUTO: 0 X10E3/UL
NRBC, AUTO (.00): 0 %
NT-PROBNP SERPL-MCNC: 184 PG/ML (ref 1–125)
PLATELET # BLD AUTO: 233 K/UL (ref 150–400)
POC MOLECULAR INFLUENZA A AGN: NEGATIVE
POC MOLECULAR INFLUENZA B AGN: NEGATIVE
POTASSIUM SERPL-SCNC: 4.1 MMOL/L (ref 3.5–5.1)
RBC # BLD AUTO: 4.31 M/UL (ref 4.2–5.4)
SARS-COV-2 AG RESP QL IA.RAPID: NEGATIVE
SODIUM SERPL-SCNC: 141 MMOL/L (ref 136–145)
WBC # BLD AUTO: 4.71 K/UL (ref 4.5–11)

## 2024-04-24 PROCEDURE — 80048 BASIC METABOLIC PNL TOTAL CA: CPT | Mod: ,,, | Performed by: CLINICAL MEDICAL LABORATORY

## 2024-04-24 PROCEDURE — 3079F DIAST BP 80-89 MM HG: CPT | Mod: ,,, | Performed by: NURSE PRACTITIONER

## 2024-04-24 PROCEDURE — 1126F AMNT PAIN NOTED NONE PRSNT: CPT | Mod: CPTII,,, | Performed by: STUDENT IN AN ORGANIZED HEALTH CARE EDUCATION/TRAINING PROGRAM

## 2024-04-24 PROCEDURE — 3078F DIAST BP <80 MM HG: CPT | Mod: CPTII,,, | Performed by: STUDENT IN AN ORGANIZED HEALTH CARE EDUCATION/TRAINING PROGRAM

## 2024-04-24 PROCEDURE — 1160F RVW MEDS BY RX/DR IN RCRD: CPT | Mod: ,,, | Performed by: NURSE PRACTITIONER

## 2024-04-24 PROCEDURE — 1159F MED LIST DOCD IN RCRD: CPT | Mod: ,,, | Performed by: NURSE PRACTITIONER

## 2024-04-24 PROCEDURE — 3075F SYST BP GE 130 - 139MM HG: CPT | Mod: ,,, | Performed by: NURSE PRACTITIONER

## 2024-04-24 PROCEDURE — 1101F PT FALLS ASSESS-DOCD LE1/YR: CPT | Mod: CPTII,,, | Performed by: STUDENT IN AN ORGANIZED HEALTH CARE EDUCATION/TRAINING PROGRAM

## 2024-04-24 PROCEDURE — 87426 SARSCOV CORONAVIRUS AG IA: CPT | Mod: QW,,, | Performed by: NURSE PRACTITIONER

## 2024-04-24 PROCEDURE — 99204 OFFICE O/P NEW MOD 45 MIN: CPT | Mod: S$PBB,,, | Performed by: STUDENT IN AN ORGANIZED HEALTH CARE EDUCATION/TRAINING PROGRAM

## 2024-04-24 PROCEDURE — 87651 STREP A DNA AMP PROBE: CPT | Mod: QW,,, | Performed by: NURSE PRACTITIONER

## 2024-04-24 PROCEDURE — 3288F FALL RISK ASSESSMENT DOCD: CPT | Mod: CPTII,,, | Performed by: STUDENT IN AN ORGANIZED HEALTH CARE EDUCATION/TRAINING PROGRAM

## 2024-04-24 PROCEDURE — 1159F MED LIST DOCD IN RCRD: CPT | Mod: CPTII,,, | Performed by: STUDENT IN AN ORGANIZED HEALTH CARE EDUCATION/TRAINING PROGRAM

## 2024-04-24 PROCEDURE — 99215 OFFICE O/P EST HI 40 MIN: CPT | Mod: PBBFAC | Performed by: STUDENT IN AN ORGANIZED HEALTH CARE EDUCATION/TRAINING PROGRAM

## 2024-04-24 PROCEDURE — 83880 ASSAY OF NATRIURETIC PEPTIDE: CPT | Mod: ,,, | Performed by: CLINICAL MEDICAL LABORATORY

## 2024-04-24 PROCEDURE — 3074F SYST BP LT 130 MM HG: CPT | Mod: CPTII,,, | Performed by: STUDENT IN AN ORGANIZED HEALTH CARE EDUCATION/TRAINING PROGRAM

## 2024-04-24 PROCEDURE — 99214 OFFICE O/P EST MOD 30 MIN: CPT | Mod: ,,, | Performed by: NURSE PRACTITIONER

## 2024-04-24 PROCEDURE — 3288F FALL RISK ASSESSMENT DOCD: CPT | Mod: ,,, | Performed by: NURSE PRACTITIONER

## 2024-04-24 PROCEDURE — 87502 INFLUENZA DNA AMP PROBE: CPT | Mod: QW,,, | Performed by: NURSE PRACTITIONER

## 2024-04-24 PROCEDURE — 1126F AMNT PAIN NOTED NONE PRSNT: CPT | Mod: ,,, | Performed by: NURSE PRACTITIONER

## 2024-04-24 PROCEDURE — 3008F BODY MASS INDEX DOCD: CPT | Mod: ,,, | Performed by: NURSE PRACTITIONER

## 2024-04-24 PROCEDURE — 3008F BODY MASS INDEX DOCD: CPT | Mod: CPTII,,, | Performed by: STUDENT IN AN ORGANIZED HEALTH CARE EDUCATION/TRAINING PROGRAM

## 2024-04-24 PROCEDURE — 85025 COMPLETE CBC W/AUTO DIFF WBC: CPT | Mod: ,,, | Performed by: CLINICAL MEDICAL LABORATORY

## 2024-04-24 PROCEDURE — 1101F PT FALLS ASSESS-DOCD LE1/YR: CPT | Mod: ,,, | Performed by: NURSE PRACTITIONER

## 2024-04-24 RX ORDER — IPRATROPIUM BROMIDE AND ALBUTEROL SULFATE 2.5; .5 MG/3ML; MG/3ML
3 SOLUTION RESPIRATORY (INHALATION) EVERY 6 HOURS PRN
Qty: 150 ML | Refills: 1 | Status: SHIPPED | OUTPATIENT
Start: 2024-04-24

## 2024-04-24 RX ORDER — ALBUTEROL SULFATE 90 UG/1
2 AEROSOL, METERED RESPIRATORY (INHALATION) EVERY 4 HOURS PRN
Qty: 18 G | Refills: 1 | Status: SHIPPED | OUTPATIENT
Start: 2024-04-24

## 2024-04-24 RX ORDER — MONTELUKAST SODIUM 10 MG/1
10 TABLET ORAL NIGHTLY
Qty: 90 TABLET | Refills: 1 | Status: SHIPPED | OUTPATIENT
Start: 2024-04-24

## 2024-04-24 NOTE — PROGRESS NOTES
[facility]  Pulmonology  NEW VISIT     Patient Name:  Dominic Casas  Primary Care Provider: Pita Ramos FNP  Date of Service: 04/24/2024  Reason for Referral: Wheezing      Chief Complaint: Shortness of breath    SUBJECTIVE   HPI:  Dominic Casas is a 68 y.o. female with HTN, HLD, hypothyroidism, chronic migraine, spondylosis of cervical & lumbar spine who presents today upon referral with complaints of wheezing.     Toms reports having wheezing episodes associated with shortness of breath. Overall she notes her breathing and wheezing symptoms have persistent since having COVID infection. She has a cough that is typically non productive if all scant clear phlegm. She has wheezing that is audible. She states that she has been diagnosed with allergies in the past. She was treated with albuterol as needed. She had the Flu on January 1st with a cough that lingered for weeks. She has just not felt herself with regards to breathing since then.    She presented to PCP today and tested negative for Flu, Covid and Strep. She was started on montelukast. She had COVID infection 10/2023 that did not require hospitalization.  She adds that her grandchild has noticed swelling in her leg. Her swelling has been presetn for about 2 weeks. She has associated pain at the back of her knee.     Past Medical History:   Diagnosis Date    Cholelithiasis     4/26/21 per US (report scanned in); HIDA scan 5/7/21 normal w/ EF49% (scanned in)    Chronic migraine without aura or status migrainosus     Hyperlipidemia     Hypertension     Hypothyroidism     Lumbar degenerative disc disease     Osteoarthritis of multiple joints     Spondylosis of cervical spine     Spondylosis, lumbosacral        Past Surgical History:   Procedure Laterality Date    PARTIAL KNEE ARTHROPLASTY Right 06/2017    Dr. Jacobs    SHOULDER ARTHROSCOPY Left 07/2012    left rotator cuff repair    TOTAL HIP ARTHROPLASTY Left 09/2017    TOTAL HIP ARTHROPLASTY  Right 10/07/2019    Dr[place]       Family History   Problem Relation Name Age of Onset    Alzheimer's disease Mother      Dementia Mother      Colon cancer Mother      Cancer Father          Waldenstrom's macroglobulinemia    No Known Problems Sister      No Known Problems Maternal Grandmother      Migraines Maternal Grandfather      No Known Problems Paternal Grandmother      No Known Problems Paternal Grandfather      No Known Problems Sister          Social History     Socioeconomic History    Marital status:    Tobacco Use    Smoking status: Never     Passive exposure: Never    Smokeless tobacco: Never   Substance and Sexual Activity    Alcohol use: Not Currently    Drug use: Never    Sexual activity: Yes     Partners: Male     Comment:      Social Determinants of Health     Financial Resource Strain: Low Risk  (1/11/2024)    Overall Financial Resource Strain (CARDIA)     Difficulty of Paying Living Expenses: Not hard at all   Food Insecurity: No Food Insecurity (1/11/2024)    Hunger Vital Sign     Worried About Running Out of Food in the Last Year: Never true     Ran Out of Food in the Last Year: Never true   Transportation Needs: No Transportation Needs (1/11/2024)    PRAPARE - Transportation     Lack of Transportation (Medical): No     Lack of Transportation (Non-Medical): No   Physical Activity: Insufficiently Active (1/11/2024)    Exercise Vital Sign     Days of Exercise per Week: 3 days     Minutes of Exercise per Session: 20 min   Stress: No Stress Concern Present (1/11/2024)    [employer] - Occupational Stress Questionnaire     Feeling of Stress : Not at all   Social Connections: Socially Integrated (1/11/2024)    Social Connection and Isolation Panel [NHANES]     Frequency of Communication with Friends and Family: More than three times a week     Frequency of Social Gatherings with Friends and Family: More than three times a week     Attends Restoration  "Services: More than 4 times per year     Active Member of Clubs or Organizations: Yes     Attends Club or Organization Meetings: More than 4 times per year     Marital Status:    Housing Stability: Low Risk  (1/11/2024)    Housing Stability Vital Sign     Unable to Pay for Housing in the Last Year: No     Number of Places Lived in the Last Year: 1     Unstable Housing in the Last Year: No       Social History     Social History Narrative    Not on file       Review of patient's allergies indicates:   Allergen Reactions    Codeine Hives and Itching        Medications: Medications reviewed to include over the counter medications.    Review of Systems: A focused ROS was completed and found to be negative except for that mentioned above.      OBJECTIVE   PHYSICAL EXAM:  Vitals:    04/24/24 1424   BP: 128/72   BP Location: Left arm   Patient Position: Sitting   BP Method: Large (Manual)   Pulse: 74   Resp: 16   SpO2: 96%   Weight: 112.5 kg (248 lb)   Height: 5' 8" (1.727 m)        GENERAL: NAD  HEENT: normocephalic, non-icteric conjunctivae, moist oral mucosa  RESPIRATORY: faint bilateral expiratory wheeze, no rales or rhonchi  CARDIOVASCULAR: Regular rate and rhythm, no murmurs rubs or gallops  SKIN: no rash, jaundice, ecchymosis or ulcers  MUSCULOSKELETAL: No clubbing or cyanosis; RLE swelling pedal to proximal knee, no erythema  NEUROLOGIC: AO ×3, no gross deficits    LABS:  Lab studies reviewed and notable for H/H 12/38.2, SEos 580    IMAGING:  Imaging reviewed and notable for CXR 02/2024 with clear lung fields bilaterally, no pleural effusion    LUNG FUNCTION TESTING: None available to review or report      ASSESSMENT & PLAN     1. Edema of right lower extremity  Assessment & Plan:  Unilateral RLE edema extending close to her knee x2 weeks. Has blanching in leg. No trauma and denies vein surgery. Concern for VTE. D-dimer pending and was scheduled for Duplex for 05/02 by PCP.   Update: D-dimer is elevated. " Planned for duplex 04/25 2 pm for work up of VTE.      2. Wheezing  Assessment & Plan:  67 yo F with long standing history of allergic symptoms and intermittent wheezing presents for evaluation of recurrent wheezing and shortness of breath that has progressed since diagnosis with COVID in 2023. SEos on labs today with elevation to 580. She has had two exacerbations this year managed outpatient. History is consistent with eosinophilic asthma but no PFTs to date. Will obtain PFTs. Agree with continuing Pulmicort BID and albuterol vs DuoNeb nebulizer Q4-6H as needed as well as montelukast.     Orders:  -     Ambulatory referral/consult to Pulmonology  -     Complete PFT w/ bronchodilator; Future           Follow up in about 4 weeks (around 5/22/2024).      Case was discussed with patient; all questions were answered to patient's satisfaction and patient verbalized understanding.       Liset Ewing MD  Pulmonary Medicine  [facility]  Phone: [phone]

## 2024-04-24 NOTE — ASSESSMENT & PLAN NOTE
Schedule venous Doppler RLE.  Appointment given for 05/02/2024 1:30 p.m..  Advised if D-dimer is positive we will have her venous Doppler appointment moved up.

## 2024-04-24 NOTE — ASSESSMENT & PLAN NOTE
Viral or allergy etiology.  No indication for antibiotic at this time.  Start Singulair daily and refer to pulmonology for further evaluation of recurrent wheezing.

## 2024-04-24 NOTE — PROGRESS NOTES
[facility] - FAMILY MEDICINE       PATIENT NAME: Dominic Casas   : 1955    AGE: 68 y.o. DATE OF ENCOUNTER: 24    MRN: [id number]      PCP: Pita Ramos FNP    Reason for Visit / Chief Complaint:     274}  Chief Complaint   Patient presents with    URI     Patient presents to clinic with complaints of wheezing and coughing since Monday       Subjective:     HPI:    Presents as a walk-in c/o cough & wheezing x 3 days.  No purulent drainage, no fever.    Was seen 24 for persistent cough and wheezing following flu dx 24 and was treated with doxy po and short course prednisone with breathing treatments & her symptoms resolved and she had no further issues until 3 days ago.    Needs refill of albuterol neb and HFA.  No past dx asthma or COPD.   Reports she saw Dr. Jimmie Rodriguez years ago and was told cough/wheeze due to allergies, but it is happening more often.  Has never tried singulair.  Started using budesonide neb again a couple days ago.    Also lower extremity edema mainly RLE x 2 wks to lower leg and foot.  Is on losartan hctz.    Review of Systems:     Review of Systems   Constitutional:  Positive for fatigue. Negative for chills and fever.   HENT:  Positive for rhinorrhea and sore throat (mild). Negative for congestion and ear pain.    Respiratory:  Positive for cough and wheezing. Negative for shortness of breath.    Cardiovascular:  Positive for leg swelling. Negative for chest pain and palpitations.   Gastrointestinal: Negative.    Genitourinary: Negative.    Skin: Negative.    Neurological: Negative.    Psychiatric/Behavioral: Negative.         Allergies and Meds: 274}     Review of patient's allergies indicates:   Allergen Reactions    Codeine Hives and Itching        Current Outpatient Medications   Medication Sig Dispense Refill    atorvastatin (LIPITOR) 20 MG tablet Take 1 tablet (20 mg total) by mouth once daily. 90 tablet 3    budesonide (PULMICORT) 0.5  mg/2 mL nebulizer solution Take 2 mLs (0.5 mg total) by nebulization every 12 (twelve) hours. 360 mL 1    levothyroxine (SYNTHROID) 88 MCG tablet Take 1 tablet (88 mcg total) by mouth before breakfast. 90 tablet 3    losartan-hydrochlorothiazide 100-25 mg (HYZAAR) 100-25 mg per tablet Take 1 tablet by mouth once daily. 90 tablet 1    promethazine (PHENERGAN) 25 MG tablet Take 2 tablets (50 mg total) by mouth every 4 (four) hours as needed for Nausea. 1-2 tablet every 4 hours as needed 20 tablet 1    sumatriptan (IMITREX) 100 MG tablet Take 1 tablet (100 mg total) by mouth every 2 (two) hours as needed for Migraine. May repeat dose in 2 hrs once in 24 hours.  Max dosage 200 mg in 24 hours. (Patient taking differently: Take 50 mg by mouth every 2 (two) hours as needed for Migraine. May repeat dose in 2 hrs once in 24 hours.  Max dosage 200 mg in 24 hours.) 27 tablet 3    SUMAtriptan succinate 6 mg/0.5 mL Crtg Inject 0.5 mLs into the skin 2 (two) times daily as needed (HA). Take 1 injection at onset of migraine and can repeat in 1-2 hours if needed. No more than 2 doses in 24 hours. 9 each 5    albuterol (VENTOLIN HFA) 90 mcg/actuation inhaler Inhale 2 puffs into the lungs every 4 (four) hours as needed for Wheezing or Shortness of Breath. Rescue 18 g 1    albuterol-ipratropium (DUO-NEB) 2.5 mg-0.5 mg/3 mL nebulizer solution Take 3 mLs by nebulization every 6 (six) hours as needed for Wheezing. Rescue 150 mL 1    montelukast (SINGULAIR) 10 mg tablet Take 1 tablet (10 mg total) by mouth every evening. 90 tablet 1     No current facility-administered medications for this visit.       Labs:274}   I have reviewed labs below:    Lab Results   Component Value Date    WBC 5.14 06/08/2023    RBC 4.58 06/08/2023    HGB 12.6 06/08/2023    HCT 38.6 06/08/2023     06/08/2023     12/18/2023    K 3.8 12/18/2023     12/18/2023    CALCIUM 9.6 12/18/2023     (H) 12/18/2023    BUN 17 12/18/2023    CREATININE  0.96 12/18/2023    EGFRNONAA 56 (L) 06/06/2022    ALT 44 12/18/2023    AST 28 12/18/2023    CHOL 146 12/18/2023    TRIG 184 (H) 12/18/2023    HDL 43 12/18/2023    LDLCALC 66 12/18/2023    TSH 2.340 12/18/2023    HGBA1C 5.6 06/08/2023    MICROALBUR <0.5 06/08/2023       Point Of Care Testing (if applicable) :  Lab Results   Component Value Date    FLUAMOLEC Negative 04/24/2024    FLUBMOLEC Negative 04/24/2024    MOLSTREPAPOC Negative 04/24/2024       Medical History: 274}     Past Medical History:   Diagnosis Date    Cholelithiasis     4/26/21 per US (report scanned in); HIDA scan 5/7/21 normal w/ EF49% (scanned in)    Chronic migraine without aura or status migrainosus     Hyperlipidemia     Hypertension     Hypothyroidism     Lumbar degenerative disc disease     Osteoarthritis of multiple joints     Spondylosis of cervical spine     Spondylosis, lumbosacral       Social History     Tobacco Use   Smoking Status Never    Passive exposure: Never   Smokeless Tobacco Never      Past Surgical History:   Procedure Laterality Date    PARTIAL KNEE ARTHROPLASTY Right 06/2017    Dr. Jacobs    SHOULDER ARTHROSCOPY Left 07/2012    left rotator cuff repair    TOTAL HIP ARTHROPLASTY Left 09/2017    TOTAL HIP ARTHROPLASTY Right 10/07/2019    Dr. Jacobs in [facility]        Health Maintenance: {      Health Maintenance         Date Due Completion Date    TETANUS VACCINE 08/14/2023 8/14/2013    COVID-19 Vaccine (4 - 2023-24 season) 09/01/2023 10/27/2021    Colorectal Cancer Screening 06/02/2024 6/2/2021    DEXA Scan 07/01/2024 7/1/2021    Override on 12/29/2010: Done    Mammogram 07/12/2024 7/12/2023    Override on 2/15/2016: Done ([facility]- Negative. Repeat yearly. Scanned into documents.)    Lipid Panel 12/18/2024 12/18/2023    Override on 11/19/2020: Done    Pap Smear 04/25/2025 4/25/2022    Override on 8/2/2016: Done (Dr. Arrington- Negative. Scanned into documents.)    Hemoglobin A1c (Diabetic Prevention Screening) 06/08/2026 6/8/2023       "    Immunization History   Administered Date(s) Administered    COVID-19 MRNA, LN-S PF (MODERNA HALF 0.25 ML DOSE) 10/27/2021    COVID-19, MRNA, LN-S, PF (MODERNA FULL 0.5 ML DOSE) 02/01/2021, 03/01/2021    Influenza 10/01/2020, 11/11/2021, 11/09/2022    Influenza - High Dose - PF (65 years and older) 11/11/2021, 11/09/2023    Pneumococcal Conjugate - 20 Valent 12/07/2022    Pneumococcal Polysaccharide - 23 Valent 10/30/2019, 12/16/2020    RSVpreF (Arexvy) 12/20/2023    Tdap 08/14/2013    Zoster 06/21/2021    Zoster Recombinant 10/30/2019, 12/02/2021       Objective:  274}   Vital Signs  Temp: 98 °F (36.7 °C)  Temp Source: Oral  Pulse: 67  Resp: 18  SpO2: 97 %  BP: 137/80  BP Location: Left arm  Patient Position: Sitting  Pain Score: 0-No pain  Height and Weight  Height: 5' 8" (172.7 cm)  Weight: 112 kg (247 lb)  BSA (Calculated - sq m): 2.32 sq meters  BMI (Calculated): 37.6  Weight in (lb) to have BMI = 25: 164.1       Wt Readings from Last 3 Encounters:   04/24/24 112 kg (247 lb)   02/05/24 109.8 kg (242 lb)   01/11/24 108.9 kg (240 lb)       Physical Exam  Vitals and nursing note reviewed.   Constitutional:       General: She is not in acute distress.     Appearance: Normal appearance. She is not ill-appearing, toxic-appearing or diaphoretic.   HENT:      Head: Normocephalic.      Right Ear: Tympanic membrane, ear canal and external ear normal.      Left Ear: Tympanic membrane, ear canal and external ear normal.      Nose: Nose normal.      Mouth/Throat:      Mouth: Mucous membranes are moist.      Pharynx: Oropharynx is clear.   Eyes:      Conjunctiva/sclera: Conjunctivae normal.   Neck:      Thyroid: No thyromegaly.      Trachea: Trachea normal.   Cardiovascular:      Rate and Rhythm: Normal rate and regular rhythm.      Pulses: Normal pulses.      Heart sounds: Normal heart sounds.   Pulmonary:      Effort: Pulmonary effort is normal. No respiratory distress.      Breath sounds: Wheezing present. No rales. "   Musculoskeletal:      Cervical back: Neck supple.      Right lower le+ Edema (1-2+) present.      Left lower leg: Edema (trace, non-pitting to lower leg and foot) present.   Lymphadenopathy:      Cervical: No cervical adenopathy.   Skin:     General: Skin is warm and dry.      Coloration: Skin is not jaundiced or pale.   Neurological:      General: No focal deficit present.      Mental Status: She is alert and oriented to person, place, and time.   Psychiatric:         Mood and Affect: Mood normal.         Behavior: Behavior normal.        Assessment and Plan: 274}     1. Upper respiratory tract infection, unspecified type  Assessment & Plan:  Viral or allergy etiology.  No indication for antibiotic at this time.  Start Singulair daily and refer to pulmonology for further evaluation of recurrent wheezing.    Orders:  -     SARS Coronavirus 2 Antigen, POCT  -     POCT Influenza A/B Molecular  -     POCT Strep A, Molecular  -     CBC Auto Differential; Future; Expected date: 2024    2. Wheezing  -     CBC Auto Differential; Future; Expected date: 2024  -     montelukast (SINGULAIR) 10 mg tablet; Take 1 tablet (10 mg total) by mouth every evening.  Dispense: 90 tablet; Refill: 1  -     albuterol-ipratropium (DUO-NEB) 2.5 mg-0.5 mg/3 mL nebulizer solution; Take 3 mLs by nebulization every 6 (six) hours as needed for Wheezing. Rescue  Dispense: 150 mL; Refill: 1  -     Ambulatory referral/consult to Pulmonology; Future; Expected date: 2024  -     albuterol (VENTOLIN HFA) 90 mcg/actuation inhaler; Inhale 2 puffs into the lungs every 4 (four) hours as needed for Wheezing or Shortness of Breath. Rescue  Dispense: 18 g; Refill: 1    3. Edema of right lower extremity  Assessment & Plan:  Schedule venous Doppler RLE.  Appointment given for 2024 1:30 p.m..  Advised if D-dimer is positive we will have her venous Doppler appointment moved up.    Orders:  -     CBC Auto Differential; Future; Expected  date: 04/24/2024  -     Basic Metabolic Panel; Future; Expected date: 04/24/2024  -     D-Dimer, Quantitative; Future; Expected date: 04/24/2024  -     NT-Pro Natriuretic Peptide; Future; Expected date: 04/24/2024  -     US Lower Extremity Veins Right; Future; Expected date: 04/24/2024    4. Essential (primary) hypertension  Assessment & Plan:  Fairly well controlled.  Continue losartan hydrochlorothiazide 100/25 mg daily.    Orders:  -     Basic Metabolic Panel; Future; Expected date: 04/24/2024    5. Chronic rhinitis  -     montelukast (SINGULAIR) 10 mg tablet; Take 1 tablet (10 mg total) by mouth every evening.  Dispense: 90 tablet; Refill: 1      Diagnosis, risks, benefits, and side effects of meds and treatment plan were discussed with the patient.  Any workup results obtained in office or prior to appointment were reviewed.  Patient to call or follow-up with any new or worsening symptoms or problems prior to next appointment.  Go to ER for any urgent complications.  All questions were answered to the satisfaction of the patient, and pt verbalized understanding and agreement to treatment plan.      Follow up for as scheduled and as needed.    Signature:  NELI Badillo-BC    Future Appointments   Date Time Provider Department Center   4/24/2024  2:20 PM Liset Ewing MD [facility]  [facility]   5/2/2024  1:30 PM [facility] [place]   6/19/2024 11:00 AM Pita Ramos FNP [facility] [place]   7/18/2024 10:20 AM [facility] MAM[facility] [place]   1/16/2025  1:00 PM AWV NURSE, [facility] FAMILY MEDICINE [facility]

## 2024-04-25 ENCOUNTER — HOSPITAL ENCOUNTER (OUTPATIENT)
Dept: RADIOLOGY | Facility: HOSPITAL | Age: 69
Discharge: HOME | End: 2024-04-25
Attending: NURSE PRACTITIONER
Payer: MEDICARE

## 2024-04-25 DIAGNOSIS — R60.0 EDEMA OF RIGHT LOWER EXTREMITY: ICD-10-CM

## 2024-04-25 PROCEDURE — 93971 EXTREMITY STUDY: CPT | Mod: TC,RT

## 2024-04-25 PROCEDURE — 93971 EXTREMITY STUDY: CPT | Mod: 26,RT,, | Performed by: RADIOLOGY

## 2024-04-25 NOTE — ASSESSMENT & PLAN NOTE
Unilateral RLE edema extending close to her knee x2 weeks. Has blanching in leg. No trauma and denies vein surgery. Concern for VTE. D-dimer pending and was scheduled for Duplex for 05/02 by PCP.   Update: D-dimer is elevated. Planned for duplex 04/25 2 pm for work up of VTE.

## 2024-04-25 NOTE — ASSESSMENT & PLAN NOTE
67 yo F with long standing history of allergic symptoms and intermittent wheezing presents for evaluation of recurrent wheezing and shortness of breath that has progressed since diagnosis with COVID in 2023. SEos on labs today with elevation to 580. She has had two exacerbations this year managed outpatient. History is consistent with eosinophilic asthma but no PFTs to date. Will obtain PFTs. Agree with continuing Pulmicort BID and albuterol vs DuoNeb nebulizer Q4-6H as needed as well as montelukast.

## 2024-04-30 ENCOUNTER — EXTERNAL CHRONIC CARE MANAGEMENT (OUTPATIENT)
Dept: FAMILY MEDICINE | Facility: CLINIC | Age: 69
End: 2024-04-30
Payer: MEDICARE

## 2024-04-30 PROCEDURE — G0511 CCM/BHI BY RHC/FQHC 20MIN MO: HCPCS | Mod: ,,, | Performed by: NURSE PRACTITIONER

## 2024-05-08 DIAGNOSIS — I10 ESSENTIAL (PRIMARY) HYPERTENSION: Chronic | ICD-10-CM

## 2024-05-08 RX ORDER — LOSARTAN POTASSIUM AND HYDROCHLOROTHIAZIDE 25; 100 MG/1; MG/1
1 TABLET ORAL DAILY
Qty: 90 TABLET | Refills: 1 | Status: SHIPPED | OUTPATIENT
Start: 2024-05-08

## 2024-05-30 DIAGNOSIS — Z12.31 BREAST CANCER SCREENING BY MAMMOGRAM: Primary | ICD-10-CM

## 2024-05-31 ENCOUNTER — EXTERNAL CHRONIC CARE MANAGEMENT (OUTPATIENT)
Dept: FAMILY MEDICINE | Facility: CLINIC | Age: 69
End: 2024-05-31
Payer: MEDICARE

## 2024-05-31 PROCEDURE — G0511 CCM/BHI BY RHC/FQHC 20MIN MO: HCPCS | Mod: ,,, | Performed by: NURSE PRACTITIONER

## 2024-06-03 ENCOUNTER — TELEPHONE (OUTPATIENT)
Dept: PULMONOLOGY | Facility: CLINIC | Age: 69
End: 2024-06-03
Payer: MEDICARE

## 2024-06-03 NOTE — TELEPHONE ENCOUNTER
----- Message from Malina Schilling sent at 6/3/2024  9:00 AM CDT -----  Who Called: Dominic Casas    Caller is requesting assistance/information from provider's office.      Preferred Method of Contact: Phone Call  Patient's Preferred Phone Number on File: [phone]   Best Call Back Number, if different:  Additional Information: pt calling to reschedule pft appt and 's appt that she has scheduled for 6/4

## 2024-06-06 ENCOUNTER — CLINICAL SUPPORT (OUTPATIENT)
Dept: PULMONOLOGY | Facility: HOSPITAL | Age: 69
End: 2024-06-06
Attending: NURSE PRACTITIONER
Payer: MEDICARE

## 2024-06-06 ENCOUNTER — OFFICE VISIT (OUTPATIENT)
Dept: PULMONOLOGY | Facility: CLINIC | Age: 69
End: 2024-06-06
Payer: MEDICARE

## 2024-06-06 VITALS
RESPIRATION RATE: 16 BRPM | SYSTOLIC BLOOD PRESSURE: 116 MMHG | BODY MASS INDEX: 37.13 KG/M2 | OXYGEN SATURATION: 95 % | HEART RATE: 77 BPM | HEIGHT: 68 IN | DIASTOLIC BLOOD PRESSURE: 80 MMHG | WEIGHT: 245 LBS

## 2024-06-06 VITALS — OXYGEN SATURATION: 96 %

## 2024-06-06 DIAGNOSIS — J45.40 MODERATE PERSISTENT ASTHMA WITHOUT COMPLICATION: Primary | ICD-10-CM

## 2024-06-06 PROCEDURE — 94726 PLETHYSMOGRAPHY LUNG VOLUMES: CPT

## 2024-06-06 PROCEDURE — 94729 DIFFUSING CAPACITY: CPT

## 2024-06-06 PROCEDURE — 1101F PT FALLS ASSESS-DOCD LE1/YR: CPT | Mod: CPTII,,, | Performed by: STUDENT IN AN ORGANIZED HEALTH CARE EDUCATION/TRAINING PROGRAM

## 2024-06-06 PROCEDURE — 1126F AMNT PAIN NOTED NONE PRSNT: CPT | Mod: CPTII,,, | Performed by: STUDENT IN AN ORGANIZED HEALTH CARE EDUCATION/TRAINING PROGRAM

## 2024-06-06 PROCEDURE — 3074F SYST BP LT 130 MM HG: CPT | Mod: CPTII,,, | Performed by: STUDENT IN AN ORGANIZED HEALTH CARE EDUCATION/TRAINING PROGRAM

## 2024-06-06 PROCEDURE — 3008F BODY MASS INDEX DOCD: CPT | Mod: CPTII,,, | Performed by: STUDENT IN AN ORGANIZED HEALTH CARE EDUCATION/TRAINING PROGRAM

## 2024-06-06 PROCEDURE — 1159F MED LIST DOCD IN RCRD: CPT | Mod: CPTII,,, | Performed by: STUDENT IN AN ORGANIZED HEALTH CARE EDUCATION/TRAINING PROGRAM

## 2024-06-06 PROCEDURE — 27100098 HC SPACER

## 2024-06-06 PROCEDURE — 99213 OFFICE O/P EST LOW 20 MIN: CPT | Mod: 25,S$PBB,, | Performed by: STUDENT IN AN ORGANIZED HEALTH CARE EDUCATION/TRAINING PROGRAM

## 2024-06-06 PROCEDURE — 1160F RVW MEDS BY RX/DR IN RCRD: CPT | Mod: CPTII,,, | Performed by: STUDENT IN AN ORGANIZED HEALTH CARE EDUCATION/TRAINING PROGRAM

## 2024-06-06 PROCEDURE — 94060 EVALUATION OF WHEEZING: CPT

## 2024-06-06 PROCEDURE — 99214 OFFICE O/P EST MOD 30 MIN: CPT | Mod: PBBFAC,25 | Performed by: STUDENT IN AN ORGANIZED HEALTH CARE EDUCATION/TRAINING PROGRAM

## 2024-06-06 PROCEDURE — 3079F DIAST BP 80-89 MM HG: CPT | Mod: CPTII,,, | Performed by: STUDENT IN AN ORGANIZED HEALTH CARE EDUCATION/TRAINING PROGRAM

## 2024-06-06 PROCEDURE — 3288F FALL RISK ASSESSMENT DOCD: CPT | Mod: CPTII,,, | Performed by: STUDENT IN AN ORGANIZED HEALTH CARE EDUCATION/TRAINING PROGRAM

## 2024-06-06 RX ORDER — FLUTICASONE PROPIONATE AND SALMETEROL 250; 50 UG/1; UG/1
1 POWDER RESPIRATORY (INHALATION) 2 TIMES DAILY
Qty: 60 EACH | Refills: 11 | Status: SHIPPED | OUTPATIENT
Start: 2024-06-06 | End: 2025-06-06

## 2024-06-06 NOTE — ASSESSMENT & PLAN NOTE
69 yo F with long standing history of allergic symptoms and intermittent wheezing presents for evaluation of recurrent wheezing and shortness of breath that has progressed since diagnosis with COVID in 2023. SEos on labs today with elevation to 580. She has had two exacerbations this year managed outpatient. History is consistent with eosinophilic asthma and PFTs demonstrating robust BD response which confirms diagnosis of eosinophilic asthma.   - start moderate dose ICS-LABA with fluticasone-salmeterol and cont IZA PRN   -- to notify us for high out of pocket price   -- rinse and spit with inhaler use   -- hold DuoNeb and Pulmicort and use for exacerbations PRN  - cont montelukast Qday  Agree with continuing Pulmicort BID and albuterol vs DuoNeb nebulizer Q4-6H as needed as well as montelukast.

## 2024-06-06 NOTE — PROGRESS NOTES
[facility]  Pulmonology  ESTABLISHED VISIT     Patient Name:  Dominic Casas  Primary Care Provider: Pita Ramos FNP  Date of Service: 06/06/2024    Chief Complaint: Shortness of breath    SUBJECTIVE   HPI:  Dominic Casas is a 68 y.o. female with HTN, HLD, hypothyroidism, chronic migraine, spondylosis of cervical & lumbar spine who presents for follow up of wheezing. Last seen 04/2024 with plan for LE duplex (negative) and PFT.    Augusto reports having improvement in her breathing since last visit. She believes the montelukast has helped significantly with her symptoms. She is using Pulmicort twice daily and DuoNebs as needed. No interval exacerbations since last seen. She has noticed an increase in her albuterol use with change in weather; she has more migraines a month as well for the same reason.    Initial HPI  Augusto reports having wheezing episodes associated with shortness of breath. Overall she notes her breathing and wheezing symptoms have persistent since having COVID infection. She has a cough that is typically non productive if all scant clear phlegm. She has wheezing that is audible. She states that she has been diagnosed with allergies in the past. She was treated with albuterol as needed. She had the Flu on January 1st with a cough that lingered for weeks. She has just not felt herself with regards to breathing since then.    She presented to PCP today and tested negative for Flu, Covid and Strep. She was started on montelukast. She had COVID infection 10/2023 that did not require hospitalization.  She adds that her grandchild has noticed swelling in her leg. Her swelling has been presetn for about 2 weeks. She has associated pain at the back of her knee.     Past Medical History:   Diagnosis Date    Cholelithiasis     4/26/21 per US (report scanned in); HIDA scan 5/7/21 normal w/ EF49% (scanned in)    Chronic migraine without aura or status migrainosus     Hyperlipidemia      Hypertension     Hypothyroidism     Lumbar degenerative disc disease     Osteoarthritis of multiple joints     Spondylosis of cervical spine     Spondylosis, lumbosacral        Past Surgical History:   Procedure Laterality Date    PARTIAL KNEE ARTHROPLASTY Right 06/2017    Dr. Jacobs    SHOULDER ARTHROSCOPY Left 07/2012    left rotator cuff repair    TOTAL HIP ARTHROPLASTY Left 09/2017    TOTAL HIP ARTHROPLASTY Right 10/07/2019    [place]       Family History   Problem Relation Name Age of Onset    Alzheimer's disease Mother      Dementia Mother      Colon cancer Mother      Cancer Father          Waldenstrom's macroglobulinemia    No Known Problems Sister      No Known Problems Maternal Grandmother      Migraines Maternal Grandfather      No Known Problems Paternal Grandmother      No Known Problems Paternal Grandfather      No Known Problems Sister          Social History     Socioeconomic History    Marital status:    Tobacco Use    Smoking status: Never     Passive exposure: Never    Smokeless tobacco: Never   Substance and Sexual Activity    Alcohol use: Not Currently    Drug use: Never    Sexual activity: Yes     Partners: Male     Comment:      Social Determinants of Health     Financial Resource Strain: Low Risk  (1/11/2024)    Overall Financial Resource Strain (CARDIA)     Difficulty of Paying Living Expenses: Not hard at all   Food Insecurity: No Food Insecurity (1/11/2024)    Hunger Vital Sign     Worried About Running Out of Food in the Last Year: Never true     Ran Out of Food in the Last Year: Never true   Transportation Needs: No Transportation Needs (1/11/2024)    PRAPARE - Transportation     Lack of Transportation (Medical): No     Lack of Transportation (Non-Medical): No   Physical Activity: Insufficiently Active (1/11/2024)    Exercise Vital Sign     Days of Exercise per Week: 3 days     Minutes of Exercise per Session: 20 min   Stress: No Stress Concern Present (1/11/2024)  "   [employer] - Occupational Stress Questionnaire     Feeling of Stress : Not at all   Housing Stability: Low Risk  (1/11/2024)    Housing Stability Vital Sign     Unable to Pay for Housing in the Last Year: No     Number of Places Lived in the Last Year: 1     Unstable Housing in the Last Year: No       Social History     Social History Narrative    Not on file       Review of patient's allergies indicates:   Allergen Reactions    Codeine Hives and Itching        Medications: Medications reviewed to include over the counter medications.    Review of Systems: A focused ROS was completed and found to be negative except for that mentioned above.      OBJECTIVE   PHYSICAL EXAM:  Vitals:    06/06/24 1347   BP: 116/80   BP Location: Left arm   Patient Position: Sitting   BP Method: Large (Manual)   Pulse: 77   Resp: 16   SpO2: 95%   Weight: 111.1 kg (245 lb)   Height: 5' 8" (1.727 m)     GENERAL: NAD  HEENT: normocephalic, non-icteric conjunctivae, moist oral mucosa  RESPIRATORY: clear to auscultation in bilateral lung fields, no wheezes, rales or rhonchi  CARDIOVASCULAR: Regular rate and rhythm, no murmurs rubs or gallops  SKIN: no rash, jaundice, ecchymosis or ulcers in exposed skin  MUSCULOSKELETAL: No clubbing or cyanosis  NEUROLOGIC: AO ×3, no gross deficits    LABS:  Lab studies reviewed and notable for H/H 12/38.2, SEos 580    IMAGING:  Imaging reviewed and notable for CXR 02/2024 with clear lung fields bilaterally, no pleural effusion    LUNG FUNCTION TESTING:   SPIROMETRY/PFT:  06/2024 Pre Post   FVC 2.45/-1.65 2.74/-1.10 +BD   FEV1 1.83/-1.77 2.08/-1.18 +BD   FEV1/FVC 75/-0.40 76/-0.27   TLC 4.72/-1.45    FRC  2.88/-0.59    RV 2.27/-0.19    DLCO 17.44/-1.46        ASSESSMENT & PLAN     1. Moderate persistent asthma without complication  Assessment & Plan:  69 yo F with long standing history of allergic symptoms and intermittent wheezing presents for evaluation of recurrent wheezing " and shortness of breath that has progressed since diagnosis with COVID in 2023. SEos on labs today with elevation to 580. She has had two exacerbations this year managed outpatient. History is consistent with eosinophilic asthma and PFTs demonstrating robust BD response which confirms diagnosis of eosinophilic asthma.   - start moderate dose ICS-LABA with fluticasone-salmeterol and cont IZA PRN   -- to notify us for high out of pocket price   -- rinse and spit with inhaler use   -- hold DuoNeb and Pulmicort and use for exacerbations PRN  - cont montelukast Qday  Agree with continuing Pulmicort BID and albuterol vs DuoNeb nebulizer Q4-6H as needed as well as montelukast.     Orders:  -     fluticasone-salmeterol diskus inhaler 250-50 mcg; Inhale 1 puff into the lungs 2 (two) times daily. Controller  Dispense: 60 each; Refill: 11          Follow up in about 8 months (around 2/6/2025) for Routine follow up.      Case was discussed with patient; all questions were answered to patient's satisfaction and patient verbalized understanding.       Liset Ewing MD  Pulmonary Medicine  [facility]  Phone: [phone]

## 2024-06-07 PROCEDURE — 94060 EVALUATION OF WHEEZING: CPT | Mod: 26,,, | Performed by: STUDENT IN AN ORGANIZED HEALTH CARE EDUCATION/TRAINING PROGRAM

## 2024-06-07 PROCEDURE — 94726 PLETHYSMOGRAPHY LUNG VOLUMES: CPT | Mod: 26,,, | Performed by: STUDENT IN AN ORGANIZED HEALTH CARE EDUCATION/TRAINING PROGRAM

## 2024-06-07 PROCEDURE — 94729 DIFFUSING CAPACITY: CPT | Mod: 26,,, | Performed by: STUDENT IN AN ORGANIZED HEALTH CARE EDUCATION/TRAINING PROGRAM

## 2024-06-07 NOTE — PROCEDURES
PFT REPORT:  SPIROMETRY:  The pre-BD FVC is decreased, 2.45L/-1.65 Z score.  The pre-BD FEV1 is decreased, 1.83L/-1.77 Z score.  Thre pre-BD FEV1/FVC ratio is 75/-0.40 Z score.    The post-BD FVC is normal, 2.74L/-1.10 Z score.  The post-BD FEV1 is normal, 2.08L/-1.18 Z score.  The post-BD FEV1/FVC ratio is 76/-0.27 Z score.    The is a significant bronchodilator effect.  The flow volume loop is normal.    LUNG VOLUMES:  The TLC is normal, 4.72L/-1.45 Z score.  The FRC is normal, 2.88L/-0.59 Z score.  The RV is normal, 2.27L/-0.19 Z score.    DIFFUSION CAPACITY:  The diffusion capacity is corrected for hemoglobin and is normal, 17.44/-1.46 Z score.    Interpretation:  The post-BD spirometry shows no obstructive defect.  There is significant response to bronchodilators.   The flow volume loop is normal.  There is no restrictive defect. All lung volumes are normal.  There is no diffusion defect once corrected for hemoglobin.     Liset Ewing MD  Pulmonary Medicine  [facility]

## 2024-06-24 ENCOUNTER — PATIENT MESSAGE (OUTPATIENT)
Dept: ADMINISTRATIVE | Facility: HOSPITAL | Age: 69
End: 2024-06-24

## 2024-06-30 ENCOUNTER — EXTERNAL CHRONIC CARE MANAGEMENT (OUTPATIENT)
Dept: FAMILY MEDICINE | Facility: CLINIC | Age: 69
End: 2024-06-30
Payer: MEDICARE

## 2024-06-30 PROCEDURE — G0511 CCM/BHI BY RHC/FQHC 20MIN MO: HCPCS | Mod: ,,, | Performed by: NURSE PRACTITIONER

## 2024-07-17 ENCOUNTER — PATIENT MESSAGE (OUTPATIENT)
Dept: FAMILY MEDICINE | Facility: CLINIC | Age: 69
End: 2024-07-17
Payer: MEDICARE

## 2024-07-17 ENCOUNTER — OFFICE VISIT (OUTPATIENT)
Dept: FAMILY MEDICINE | Facility: CLINIC | Age: 69
End: 2024-07-17
Payer: MEDICARE

## 2024-07-17 VITALS
WEIGHT: 242.19 LBS | DIASTOLIC BLOOD PRESSURE: 82 MMHG | TEMPERATURE: 98 F | SYSTOLIC BLOOD PRESSURE: 133 MMHG | RESPIRATION RATE: 18 BRPM | BODY MASS INDEX: 36.71 KG/M2 | HEIGHT: 68 IN | OXYGEN SATURATION: 96 % | HEART RATE: 70 BPM

## 2024-07-17 DIAGNOSIS — E03.9 HYPOTHYROIDISM (ACQUIRED): Chronic | ICD-10-CM

## 2024-07-17 DIAGNOSIS — Z78.0 POSTMENOPAUSAL: ICD-10-CM

## 2024-07-17 DIAGNOSIS — I10 ESSENTIAL (PRIMARY) HYPERTENSION: Primary | Chronic | ICD-10-CM

## 2024-07-17 PROBLEM — J45.40 MODERATE PERSISTENT ASTHMA WITHOUT COMPLICATION: Chronic | Status: ACTIVE | Noted: 2024-04-24

## 2024-07-17 PROBLEM — J06.9 UPPER RESPIRATORY TRACT INFECTION: Status: RESOLVED | Noted: 2024-04-24 | Resolved: 2024-07-17

## 2024-07-17 PROCEDURE — 1101F PT FALLS ASSESS-DOCD LE1/YR: CPT | Mod: ,,, | Performed by: NURSE PRACTITIONER

## 2024-07-17 PROCEDURE — 3075F SYST BP GE 130 - 139MM HG: CPT | Mod: ,,, | Performed by: NURSE PRACTITIONER

## 2024-07-17 PROCEDURE — 1159F MED LIST DOCD IN RCRD: CPT | Mod: ,,, | Performed by: NURSE PRACTITIONER

## 2024-07-17 PROCEDURE — 1126F AMNT PAIN NOTED NONE PRSNT: CPT | Mod: ,,, | Performed by: NURSE PRACTITIONER

## 2024-07-17 PROCEDURE — 99214 OFFICE O/P EST MOD 30 MIN: CPT | Mod: ,,, | Performed by: NURSE PRACTITIONER

## 2024-07-17 PROCEDURE — 1160F RVW MEDS BY RX/DR IN RCRD: CPT | Mod: ,,, | Performed by: NURSE PRACTITIONER

## 2024-07-17 PROCEDURE — 3079F DIAST BP 80-89 MM HG: CPT | Mod: ,,, | Performed by: NURSE PRACTITIONER

## 2024-07-17 PROCEDURE — 3008F BODY MASS INDEX DOCD: CPT | Mod: ,,, | Performed by: NURSE PRACTITIONER

## 2024-07-17 PROCEDURE — 3288F FALL RISK ASSESSMENT DOCD: CPT | Mod: ,,, | Performed by: NURSE PRACTITIONER

## 2024-07-17 RX ORDER — LANOLIN ALCOHOL/MO/W.PET/CERES
1 CREAM (GRAM) TOPICAL
COMMUNITY

## 2024-07-17 RX ORDER — IBUPROFEN 200 MG
200 TABLET ORAL EVERY 6 HOURS PRN
COMMUNITY

## 2024-07-17 RX ORDER — EPINEPHRINE 0.22MG
100 AEROSOL WITH ADAPTER (ML) INHALATION DAILY
COMMUNITY

## 2024-07-17 NOTE — PROGRESS NOTES
[facility] - FAMILY MEDICINE       PATIENT NAME: Dominic Casas   : 1955    AGE: 68 y.o. DATE OF ENCOUNTER: 24    MRN: [id number]      PCP: Pita Ramos FNP    Subjective:     Reason for Visit / Chief Complaint:     274}  Chief Complaint   Patient presents with    Hypertension     Patient reports to the clinic today for 6 month follow up.    Hypothyroidism    Health Maintenance     Care gaps addressed, patient had her Tdap at [facility] on Monday, would like to set up her DEXA scan.    Estephanie Prajapati, SOTERO Prajapati, SOTERO     6 mth f/u HTN & hypothyroidism  Cough lasted forever, but finally resolved after starting Wixela.    Review of Systems:     Review of Systems   Constitutional:  Negative for activity change and unexpected weight change.   HENT:  Negative for hearing loss, rhinorrhea and trouble swallowing.    Eyes:  Negative for discharge and visual disturbance.   Respiratory:  Negative for chest tightness, shortness of breath and wheezing.    Cardiovascular:  Negative for chest pain and palpitations.   Gastrointestinal:  Negative for blood in stool, constipation, diarrhea and vomiting.   Endocrine: Negative for polydipsia and polyuria.   Genitourinary:  Negative for difficulty urinating, dysuria, hematuria and menstrual problem.   Musculoskeletal:  Positive for arthralgias (chronic). Negative for joint swelling and neck pain.   Skin: Negative.    Neurological:  Positive for headaches (chronic). Negative for weakness.   Psychiatric/Behavioral:  Positive for sleep disturbance. Negative for confusion and dysphoric mood.        Allergies and Meds: 274}     Review of patient's allergies indicates:   Allergen Reactions    Codeine Hives and Itching        Current Outpatient Medications   Medication Sig Dispense Refill    albuterol (VENTOLIN HFA) 90 mcg/actuation inhaler Inhale 2 puffs into the lungs every 4 (four) hours as needed for Wheezing or Shortness of Breath. Rescue 18 g 1     albuterol-ipratropium (DUO-NEB) 2.5 mg-0.5 mg/3 mL nebulizer solution Take 3 mLs by nebulization every 6 (six) hours as needed for Wheezing. Rescue 150 mL 1    atorvastatin (LIPITOR) 20 MG tablet Take 1 tablet (20 mg total) by mouth once daily. 90 tablet 3    budesonide (PULMICORT) 0.5 mg/2 mL nebulizer solution Take 2 mLs (0.5 mg total) by nebulization every 12 (twelve) hours. 360 mL 1    coenzyme Q10 (CO Q-10) 100 mg capsule Take 100 mg by mouth once daily.      ferrous sulfate (FEOSOL) Tab tablet Take 1 tablet by mouth daily with breakfast.      fluticasone-salmeterol diskus inhaler 250-50 mcg Inhale 1 puff into the lungs 2 (two) times daily. Controller 60 each 11    ibuprofen (ADVIL,MOTRIN) 200 MG tablet Take 200 mg by mouth every 6 (six) hours as needed for Pain.      KRILL OIL ORAL Take 1,000 mg by mouth once daily.      levothyroxine (SYNTHROID) 88 MCG tablet Take 1 tablet (88 mcg total) by mouth before breakfast. 90 tablet 3    losartan-hydrochlorothiazide 100-25 mg (HYZAAR) 100-25 mg per tablet Take 1 tablet by mouth once daily. 90 tablet 1    montelukast (SINGULAIR) 10 mg tablet Take 1 tablet (10 mg total) by mouth every evening. 90 tablet 1    promethazine (PHENERGAN) 25 MG tablet Take 2 tablets (50 mg total) by mouth every 4 (four) hours as needed for Nausea. 1-2 tablet every 4 hours as needed 20 tablet 1    sumatriptan (IMITREX) 100 MG tablet Take 1 tablet (100 mg total) by mouth every 2 (two) hours as needed for Migraine. May repeat dose in 2 hrs once in 24 hours.  Max dosage 200 mg in 24 hours. (Patient taking differently: Take 50 mg by mouth every 2 (two) hours as needed for Migraine. May repeat dose in 2 hrs once in 24 hours.  Max dosage 200 mg in 24 hours.) 27 tablet 3    SUMAtriptan succinate 6 mg/0.5 mL Crtg Inject 0.5 mLs into the skin 2 (two) times daily as needed (HA). Take 1 injection at onset of migraine and can repeat in 1-2 hours if needed. No more than 2 doses in 24 hours. 9 each 5     No  current facility-administered medications for this visit.       Labs:274}   I have reviewed labs below:    Lab Results   Component Value Date    WBC 4.71 04/24/2024    RBC 4.31 04/24/2024    HGB 12.0 04/24/2024    HCT 38.2 04/24/2024     04/24/2024     04/24/2024    K 4.1 04/24/2024     04/24/2024    CALCIUM 9.7 04/24/2024    GLU 99 04/24/2024    BUN 16 04/24/2024    CREATININE 0.95 04/24/2024    EGFRNONAA 56 (L) 06/06/2022    ALT 44 12/18/2023    AST 28 12/18/2023    CHOL 146 12/18/2023    TRIG 184 (H) 12/18/2023    HDL 43 12/18/2023    LDLCALC 66 12/18/2023    TSH 2.340 12/18/2023    HGBA1C 5.6 06/08/2023    MICROALBUR <0.5 06/08/2023     Medical History: 274}     Past Medical History:   Diagnosis Date    Cholelithiasis     4/26/21 per US (report scanned in); HIDA scan 5/7/21 normal w/ EF49% (scanned in)    Chronic migraine without aura or status migrainosus     Hyperlipidemia     Hypertension     Hypothyroidism     Lumbar degenerative disc disease     Osteoarthritis of multiple joints     Spondylosis of cervical spine     Spondylosis, lumbosacral       Social History     Tobacco Use   Smoking Status Never    Passive exposure: Never   Smokeless Tobacco Never      Past Surgical History:   Procedure Laterality Date    PARTIAL KNEE ARTHROPLASTY Right 06/2017    Dr. Jacobs    SHOULDER ARTHROSCOPY Left 07/2012    left rotator cuff repair    TOTAL HIP ARTHROPLASTY Left 09/2017    TOTAL HIP ARTHROPLASTY Right 10/07/2019    Dr. [place]        Health Maintenance:      Health Maintenance Topics with due status: Not Due       Topic Last Completion Date    Pap Smear 04/25/2022    Hemoglobin A1c (Diabetic Prevention Screening) 06/08/2023    Influenza Vaccine 12/18/2023    Lipid Panel 12/18/2023    Colorectal Cancer Screening 06/24/2024    TETANUS VACCINE 07/15/2024       Objective:  274}   Vital Signs  Temp: 97.6 °F (36.4 °C)  Temp Source: Oral  Pulse: 70  Resp: 18  SpO2: 96 %  BP: 133/82  BP Location:  "Right arm  Patient Position: Sitting  Pain Score: 0-No pain  Height and Weight  Height: 5' 8" (172.7 cm)  Weight: 109.9 kg (242 lb 3.2 oz)  BSA (Calculated - sq m): 2.3 sq meters  BMI (Calculated): 36.8  Weight in (lb) to have BMI = 25: 164.1    Over the last two weeks how often have you been bothered by little interest or pleasure in doing things: 0  Over the last two weeks how often have you been bothered by feeling down, depressed or hopeless: 0  PHQ-2 Total Score: 0  PHQ-9 Score: 1  PHQ-9 Interpretation: Minimal or None    Wt Readings from Last 3 Encounters:   07/17/24 109.9 kg (242 lb 3.2 oz)   06/06/24 111.1 kg (245 lb)   04/24/24 112.5 kg (248 lb)     Physical Exam  Vitals and nursing note reviewed.   Constitutional:       General: She is not in acute distress.     Appearance: Normal appearance. She is not ill-appearing.   HENT:      Head: Normocephalic.   Eyes:      Conjunctiva/sclera: Conjunctivae normal.   Neck:      Trachea: Trachea normal.   Cardiovascular:      Rate and Rhythm: Normal rate and regular rhythm.      Pulses: Normal pulses.      Heart sounds: Normal heart sounds.   Pulmonary:      Effort: Pulmonary effort is normal. No respiratory distress.      Breath sounds: Normal breath sounds. No wheezing, rhonchi or rales.   Musculoskeletal:      Cervical back: Neck supple.      Right lower leg: No edema.      Left lower leg: No edema.   Lymphadenopathy:      Cervical: No cervical adenopathy.   Skin:     General: Skin is warm and dry.      Coloration: Skin is not jaundiced or pale.   Neurological:      General: No focal deficit present.      Mental Status: She is alert and oriented to person, place, and time.      Gait: Gait normal.   Psychiatric:         Mood and Affect: Mood normal.         Behavior: Behavior normal.          Assessment and Plan: 274}     1. Essential (primary) hypertension  Assessment & Plan:  Fairly well controlled, optimal < 130/80.  Continue losartan hctz.      2. Hypothyroidism " (acquired)  Assessment & Plan:  Lab Results   Component Value Date    TSH 2.340 12/18/2023     Continue levothyroxine.      3. Postmenopausal  Assessment & Plan:  Schedule updated bone density exam.    Orders:  -     DXA Bone Density Axial Skeleton 1 or more sites; Future; Expected date: 07/24/2024      Diagnosis, risks, benefits, and side effects of any meds and treatment plan were discussed with the patient.  All questions were answered to the satisfaction of the patient, and pt verbalized understanding and agreement to treatment plan.      Follow up in about 6 months (around 1/17/2025) for hypertension, hyperlipidemia, hypothyroidism, with fasting labs.    Signature:  NELI Badillo-BC    Future Appointments   Date Time Provider Department Center   7/18/2024 10:20 AM [facility] MAMMO2 [facility] [place]   7/18/2024 10:40 AM [facility] DEXA1 [facility] [place]   1/16/2025  1:00 PM [facility] NURSE, [facility] FAMILY MEDICINE [facility] STEPHANIE Ricketts   2/6/2025  1:00 PM Liset Ewing MD [facility]  PULM [facility]

## 2024-07-18 ENCOUNTER — HOSPITAL ENCOUNTER (OUTPATIENT)
Dept: RADIOLOGY | Facility: HOSPITAL | Age: 69
Discharge: HOME | End: 2024-07-18
Attending: OBSTETRICS & GYNECOLOGY
Payer: MEDICARE

## 2024-07-18 ENCOUNTER — HOSPITAL ENCOUNTER (OUTPATIENT)
Dept: RADIOLOGY | Facility: HOSPITAL | Age: 69
Discharge: HOME | End: 2024-07-18
Attending: NURSE PRACTITIONER
Payer: MEDICARE

## 2024-07-18 VITALS — BODY MASS INDEX: 3.33 KG/M2 | HEIGHT: 68 IN | WEIGHT: 22 LBS

## 2024-07-18 DIAGNOSIS — Z78.0 POSTMENOPAUSAL: ICD-10-CM

## 2024-07-18 DIAGNOSIS — Z12.31 BREAST CANCER SCREENING BY MAMMOGRAM: ICD-10-CM

## 2024-07-18 PROCEDURE — 77080 DXA BONE DENSITY AXIAL: CPT | Mod: TC

## 2024-07-18 PROCEDURE — 77081 DXA BONE DENSITY APPENDICULR: CPT | Mod: 26,59,, | Performed by: RADIOLOGY

## 2024-07-18 PROCEDURE — 77080 DXA BONE DENSITY AXIAL: CPT | Mod: 26,,, | Performed by: RADIOLOGY

## 2024-07-18 PROCEDURE — 77063 BREAST TOMOSYNTHESIS BI: CPT | Mod: TC

## 2024-07-31 ENCOUNTER — EXTERNAL CHRONIC CARE MANAGEMENT (OUTPATIENT)
Dept: FAMILY MEDICINE | Facility: CLINIC | Age: 69
End: 2024-07-31
Payer: MEDICARE

## 2024-07-31 PROCEDURE — G0511 CCM/BHI BY RHC/FQHC 20MIN MO: HCPCS | Mod: ,,, | Performed by: NURSE PRACTITIONER

## 2024-08-31 ENCOUNTER — EXTERNAL CHRONIC CARE MANAGEMENT (OUTPATIENT)
Dept: FAMILY MEDICINE | Facility: CLINIC | Age: 69
End: 2024-08-31
Payer: MEDICARE

## 2024-08-31 PROCEDURE — G0511 CCM/BHI BY RHC/FQHC 20MIN MO: HCPCS | Mod: ,,, | Performed by: NURSE PRACTITIONER

## 2024-09-30 ENCOUNTER — EXTERNAL CHRONIC CARE MANAGEMENT (OUTPATIENT)
Dept: FAMILY MEDICINE | Facility: CLINIC | Age: 69
End: 2024-09-30
Payer: MEDICARE

## 2024-09-30 PROCEDURE — G0511 CCM/BHI BY RHC/FQHC 20MIN MO: HCPCS | Mod: ,,, | Performed by: NURSE PRACTITIONER

## 2024-10-31 ENCOUNTER — EXTERNAL CHRONIC CARE MANAGEMENT (OUTPATIENT)
Dept: FAMILY MEDICINE | Facility: CLINIC | Age: 69
End: 2024-10-31
Payer: MEDICARE

## 2024-10-31 PROCEDURE — G0511 CCM/BHI BY RHC/FQHC 20MIN MO: HCPCS | Mod: ,,, | Performed by: NURSE PRACTITIONER

## 2024-11-14 ENCOUNTER — OFFICE VISIT (OUTPATIENT)
Dept: FAMILY MEDICINE | Facility: CLINIC | Age: 69
End: 2024-11-14
Payer: MEDICARE

## 2024-11-14 VITALS
DIASTOLIC BLOOD PRESSURE: 75 MMHG | HEIGHT: 68 IN | HEART RATE: 61 BPM | RESPIRATION RATE: 16 BRPM | WEIGHT: 251 LBS | TEMPERATURE: 98 F | OXYGEN SATURATION: 96 % | BODY MASS INDEX: 38.04 KG/M2 | SYSTOLIC BLOOD PRESSURE: 136 MMHG

## 2024-11-14 DIAGNOSIS — J02.9 SORE THROAT: ICD-10-CM

## 2024-11-14 DIAGNOSIS — J22 LOWER RESPIRATORY TRACT INFECTION: Primary | ICD-10-CM

## 2024-11-14 DIAGNOSIS — R09.89 RHONCHI: ICD-10-CM

## 2024-11-14 DIAGNOSIS — R05.1 ACUTE COUGH: ICD-10-CM

## 2024-11-14 DIAGNOSIS — R51.9 NONINTRACTABLE HEADACHE, UNSPECIFIED CHRONICITY PATTERN, UNSPECIFIED HEADACHE TYPE: ICD-10-CM

## 2024-11-14 DIAGNOSIS — R06.2 WHEEZING: ICD-10-CM

## 2024-11-14 LAB
CTP QC/QA: YES
MOLECULAR STREP A: NEGATIVE
POC MOLECULAR INFLUENZA A AGN: NEGATIVE
POC MOLECULAR INFLUENZA B AGN: NEGATIVE
SARS-COV-2 RDRP RESP QL NAA+PROBE: NEGATIVE

## 2024-11-14 RX ORDER — GUAIFENESIN 100 MG/5ML
400 SOLUTION ORAL 4 TIMES DAILY PRN
Qty: 473 ML | Refills: 0 | Status: SHIPPED | OUTPATIENT
Start: 2024-11-14 | End: 2024-11-24

## 2024-11-14 RX ORDER — MOXIFLOXACIN HYDROCHLORIDE 400 MG/1
400 TABLET ORAL DAILY
Qty: 7 TABLET | Refills: 0 | Status: SHIPPED | OUTPATIENT
Start: 2024-11-14 | End: 2024-11-21

## 2024-11-14 NOTE — PROGRESS NOTES
Subjective:       Patient ID: Dominic Casas is a 68 y.o. female.    Chief Complaint: Cough, Headache, Sore Throat, Nasal Congestion, Fatigue, and Gastroesophageal Reflux    Cough, Headache, Sore Throat, Nasal Congestion, Fatigue, and Gastroesophageal Reflux      Cough  Associated symptoms include headaches and a sore throat. Pertinent negatives include no chest pain, ear pain, fever, rash or shortness of breath.   Headache   Associated symptoms include coughing and a sore throat. Pertinent negatives include no abdominal pain, back pain, dizziness, ear pain, eye pain, fever, nausea, neck pain, vomiting or weakness.   Sore Throat   Associated symptoms include coughing and headaches. Pertinent negatives include no abdominal pain, congestion, ear pain, neck pain, shortness of breath or vomiting.   Fatigue  Associated symptoms include coughing, fatigue, headaches and a sore throat. Pertinent negatives include no abdominal pain, change in bowel habit, chest pain, congestion, fever, nausea, neck pain, rash, vomiting or weakness.   Gastroesophageal Reflux  She complains of coughing and a sore throat. She reports no abdominal pain, no chest pain or no nausea. Associated symptoms include fatigue.     Review of Systems   Constitutional:  Positive for fatigue. Negative for appetite change and fever.   HENT:  Positive for sore throat. Negative for nasal congestion and ear pain.    Eyes:  Negative for pain, discharge and itching.   Respiratory:  Positive for cough. Negative for shortness of breath.    Cardiovascular:  Negative for chest pain and leg swelling.   Gastrointestinal:  Negative for abdominal pain, change in bowel habit, nausea and vomiting.   Musculoskeletal:  Negative for back pain, gait problem and neck pain.   Integumentary:  Negative for rash and wound.   Allergic/Immunologic: Negative for immunocompromised state.   Neurological:  Positive for headaches. Negative for dizziness and weakness.   All other systems  reviewed and are negative.        Objective:      Physical Exam  Vitals and nursing note reviewed.   Constitutional:       General: She is not in acute distress.     Appearance: Normal appearance. She is not ill-appearing, toxic-appearing or diaphoretic.   HENT:      Head: Normocephalic.      Right Ear: Tympanic membrane, ear canal and external ear normal.      Left Ear: Tympanic membrane, ear canal and external ear normal.      Nose: Mucosal edema, congestion and rhinorrhea present.      Right Turbinates: Swollen.      Left Turbinates: Swollen.      Mouth/Throat:      Mouth: Mucous membranes are moist.      Pharynx: Posterior oropharyngeal erythema present. No oropharyngeal exudate.   Eyes:      General: No scleral icterus.        Right eye: No discharge.         Left eye: No discharge.      Extraocular Movements: Extraocular movements intact.      Conjunctiva/sclera: Conjunctivae normal.      Pupils: Pupils are equal, round, and reactive to light.   Cardiovascular:      Rate and Rhythm: Normal rate and regular rhythm.      Pulses: Normal pulses.      Heart sounds: Normal heart sounds. No murmur heard.  Pulmonary:      Effort: Pulmonary effort is normal. No respiratory distress.      Breath sounds: Wheezing and rhonchi present. No rales.   Musculoskeletal:         General: Normal range of motion.      Cervical back: Neck supple. No tenderness.   Lymphadenopathy:      Cervical: No cervical adenopathy.   Skin:     General: Skin is warm and dry.      Capillary Refill: Capillary refill takes less than 2 seconds.      Findings: No rash.   Neurological:      Mental Status: She is alert and oriented to person, place, and time.   Psychiatric:         Mood and Affect: Mood normal.         Behavior: Behavior normal.         Thought Content: Thought content normal.         Judgment: Judgment normal.         Office Visit on 11/14/2024   Component Date Value Ref Range Status    POC Rapid COVID 11/14/2024 Negative  Negative  Final     Acceptable 11/14/2024 Yes   Final    Molecular Strep A, POC 11/14/2024 Negative  Negative Final     Acceptable 11/14/2024 Yes   Final    POC Molecular Influenza A Ag 11/14/2024 Negative  Negative Final    POC Molecular Influenza B Ag 11/14/2024 Negative  Negative Final     Acceptable 11/14/2024 Yes   Final      Assessment:       1. Lower respiratory tract infection    2. Acute cough    3. Sore throat    4. Nonintractable headache, unspecified chronicity pattern, unspecified headache type    5. Rhonchi    6. Wheezing        Plan:   Lower respiratory tract infection  -     moxifloxacin (AVELOX) 400 mg tablet; Take 1 tablet (400 mg total) by mouth once daily. for 7 days  Dispense: 7 tablet; Refill: 0    Acute cough  -     POCT COVID-19 Rapid Screening  -     POCT Strep A, Molecular  -     POCT Influenza A/B Molecular  -     guaiFENesin 100 mg/5 ml (ROBITUSSIN) 100 mg/5 mL syrup; Take 20 mLs (400 mg total) by mouth 4 (four) times daily as needed for Cough or Congestion.  Dispense: 473 mL; Refill: 0    Sore throat  -     POCT COVID-19 Rapid Screening  -     POCT Strep A, Molecular  -     POCT Influenza A/B Molecular    Nonintractable headache, unspecified chronicity pattern, unspecified headache type  -     POCT COVID-19 Rapid Screening  -     POCT Strep A, Molecular  -     POCT Influenza A/B Molecular    Rhonchi  -     guaiFENesin 100 mg/5 ml (ROBITUSSIN) 100 mg/5 mL syrup; Take 20 mLs (400 mg total) by mouth 4 (four) times daily as needed for Cough or Congestion.  Dispense: 473 mL; Refill: 0    Wheezing       Use albuterol nebs as directed for wheezing and chest congestion    Risks, benefits, and side effects were discussed with the patient. All questions were answered to the fullest satisfaction of the patient, and pt verbalized understanding and agreement to treatment plan. Pt was to call with any new or worsening symptoms, or present to the ER

## 2024-11-30 ENCOUNTER — EXTERNAL CHRONIC CARE MANAGEMENT (OUTPATIENT)
Dept: FAMILY MEDICINE | Facility: CLINIC | Age: 69
End: 2024-11-30
Payer: MEDICARE

## 2024-11-30 PROCEDURE — G0511 CCM/BHI BY RHC/FQHC 20MIN MO: HCPCS | Mod: ,,, | Performed by: NURSE PRACTITIONER

## 2024-12-31 ENCOUNTER — OFFICE VISIT (OUTPATIENT)
Dept: FAMILY MEDICINE | Facility: CLINIC | Age: 69
End: 2024-12-31
Payer: MEDICARE

## 2024-12-31 ENCOUNTER — EXTERNAL CHRONIC CARE MANAGEMENT (OUTPATIENT)
Dept: FAMILY MEDICINE | Facility: CLINIC | Age: 69
End: 2024-12-31
Payer: MEDICARE

## 2024-12-31 VITALS
OXYGEN SATURATION: 96 % | HEIGHT: 68 IN | TEMPERATURE: 99 F | SYSTOLIC BLOOD PRESSURE: 113 MMHG | DIASTOLIC BLOOD PRESSURE: 81 MMHG | WEIGHT: 241.81 LBS | HEART RATE: 96 BPM | RESPIRATION RATE: 18 BRPM | BODY MASS INDEX: 36.65 KG/M2

## 2024-12-31 DIAGNOSIS — J06.9 UPPER RESPIRATORY TRACT INFECTION, UNSPECIFIED TYPE: ICD-10-CM

## 2024-12-31 DIAGNOSIS — G43.709 CHRONIC MIGRAINE WITHOUT AURA WITHOUT STATUS MIGRAINOSUS, NOT INTRACTABLE: Chronic | ICD-10-CM

## 2024-12-31 DIAGNOSIS — J45.40 MODERATE PERSISTENT ASTHMA WITHOUT COMPLICATION: Chronic | ICD-10-CM

## 2024-12-31 DIAGNOSIS — J01.00 ACUTE NON-RECURRENT MAXILLARY SINUSITIS: Primary | ICD-10-CM

## 2024-12-31 PROBLEM — R09.89 RHONCHI: Status: RESOLVED | Noted: 2024-11-14 | Resolved: 2024-12-31

## 2024-12-31 LAB
CTP QC/QA: YES
CTP QC/QA: YES
POC MOLECULAR INFLUENZA A AGN: NEGATIVE
POC MOLECULAR INFLUENZA B AGN: NEGATIVE
SARS-COV-2 RDRP RESP QL NAA+PROBE: NEGATIVE

## 2024-12-31 PROCEDURE — 3074F SYST BP LT 130 MM HG: CPT | Mod: ,,, | Performed by: NURSE PRACTITIONER

## 2024-12-31 PROCEDURE — 1159F MED LIST DOCD IN RCRD: CPT | Mod: ,,, | Performed by: NURSE PRACTITIONER

## 2024-12-31 PROCEDURE — 3079F DIAST BP 80-89 MM HG: CPT | Mod: ,,, | Performed by: NURSE PRACTITIONER

## 2024-12-31 PROCEDURE — 1160F RVW MEDS BY RX/DR IN RCRD: CPT | Mod: ,,, | Performed by: NURSE PRACTITIONER

## 2024-12-31 PROCEDURE — 96372 THER/PROPH/DIAG INJ SC/IM: CPT | Mod: ,,, | Performed by: NURSE PRACTITIONER

## 2024-12-31 PROCEDURE — 87502 INFLUENZA DNA AMP PROBE: CPT | Mod: QW,,, | Performed by: NURSE PRACTITIONER

## 2024-12-31 PROCEDURE — 1126F AMNT PAIN NOTED NONE PRSNT: CPT | Mod: ,,, | Performed by: NURSE PRACTITIONER

## 2024-12-31 PROCEDURE — 3288F FALL RISK ASSESSMENT DOCD: CPT | Mod: ,,, | Performed by: NURSE PRACTITIONER

## 2024-12-31 PROCEDURE — 87635 SARS-COV-2 COVID-19 AMP PRB: CPT | Mod: QW,,, | Performed by: NURSE PRACTITIONER

## 2024-12-31 PROCEDURE — 99214 OFFICE O/P EST MOD 30 MIN: CPT | Mod: 25,,, | Performed by: NURSE PRACTITIONER

## 2024-12-31 PROCEDURE — 1101F PT FALLS ASSESS-DOCD LE1/YR: CPT | Mod: ,,, | Performed by: NURSE PRACTITIONER

## 2024-12-31 PROCEDURE — G0511 CCM/BHI BY RHC/FQHC 20MIN MO: HCPCS | Mod: ,,, | Performed by: NURSE PRACTITIONER

## 2024-12-31 PROCEDURE — 3008F BODY MASS INDEX DOCD: CPT | Mod: ,,, | Performed by: NURSE PRACTITIONER

## 2024-12-31 RX ORDER — DEXAMETHASONE SODIUM PHOSPHATE 4 MG/ML
6 INJECTION, SOLUTION INTRA-ARTICULAR; INTRALESIONAL; INTRAMUSCULAR; INTRAVENOUS; SOFT TISSUE
Status: COMPLETED | OUTPATIENT
Start: 2024-12-31 | End: 2024-12-31

## 2024-12-31 RX ORDER — DOXYCYCLINE HYCLATE 100 MG
100 TABLET ORAL EVERY 12 HOURS
Qty: 20 TABLET | Refills: 0 | Status: SHIPPED | OUTPATIENT
Start: 2024-12-31 | End: 2025-01-10

## 2024-12-31 RX ORDER — SUMATRIPTAN SUCCINATE 100 MG/1
50 TABLET ORAL
Qty: 27 TABLET | Refills: 3 | Status: CANCELLED | OUTPATIENT
Start: 2024-12-31

## 2024-12-31 RX ORDER — CEFTRIAXONE 1 G/1
1 INJECTION, POWDER, FOR SOLUTION INTRAMUSCULAR; INTRAVENOUS
Status: COMPLETED | OUTPATIENT
Start: 2024-12-31 | End: 2024-12-31

## 2024-12-31 RX ORDER — METHYLPREDNISOLONE 4 MG/1
TABLET ORAL
Qty: 21 TABLET | Refills: 0 | Status: SHIPPED | OUTPATIENT
Start: 2024-12-31

## 2024-12-31 RX ORDER — SUMATRIPTAN SUCCINATE 100 MG/1
50 TABLET ORAL
Qty: 27 TABLET | Refills: 3 | Status: SHIPPED | OUTPATIENT
Start: 2024-12-31

## 2024-12-31 RX ADMIN — DEXAMETHASONE SODIUM PHOSPHATE 6 MG: 4 INJECTION, SOLUTION INTRA-ARTICULAR; INTRALESIONAL; INTRAMUSCULAR; INTRAVENOUS; SOFT TISSUE at 09:12

## 2024-12-31 RX ADMIN — CEFTRIAXONE 1 G: 1 INJECTION, POWDER, FOR SOLUTION INTRAMUSCULAR; INTRAVENOUS at 09:12

## 2024-12-31 NOTE — PROGRESS NOTES
[facility]  [address] [place] [address]  Phone: [phone]  Fax: [phone]       PATIENT NAME: Dominic Casas   : 1955    AGE: 69 y.o. DATE OF ENCOUNTER: 24    MRN: [id number]      PCP: Pita Ramos FNP    Subjective:     Reason for Visit / Chief Complaint:     274}  Chief Complaint   Patient presents with    Cough     Patient presents to clinic complaints of a deep cough started yesterday.  Patient has had sinus congestion body aches and chills.      History of Present Illness    HPI:  Patient reports symptoms of severe sinus infection for over 2 weeks, which has recently progressed to include chest symptoms. Symptoms include green and bloody nasal discharge, sinus pressure and drainage. Yesterday, the symptoms moved from the nose into the chest, resulting in wheezing and a productive cough with green, purulent sputum. Patient used a nebulizer this morning to manage symptoms.    Patient has a history of asthma, which has complicated the current illness. Lying down exacerbates symptoms, particularly congestion in the posterior region.    Patient has been taking Claritin as an OTC medication and attempted to use Mucinex but found it ineffective. Patient acknowledges insufficient water intake, which may have impacted the effectiveness of the mucus-thinning medication.    6 weeks ago, the patient visited urgent care for a similar illness and was prescribed Avalox. Patient also recalls being sick with a similar condition exactly 1 year ago, which took a long time to resolve and ultimately led to visits with Pulmonology and the diagnosis of asthma.    This recurring respiratory issue is described as a significant problem throughout the year, with episodes of severe respiratory symptoms followed by a persistent cough. Patient denies coughing up mucus every time she coughs.      ROS:  ENT: +nasal congestion  Respiratory: +cough         Allergies and Meds:  274}     Review of patient's allergies indicates:   Allergen Reactions    Codeine Hives and Itching        Current Outpatient Medications   Medication Sig Dispense Refill    albuterol (VENTOLIN HFA) 90 mcg/actuation inhaler Inhale 2 puffs into the lungs every 4 (four) hours as needed for Wheezing or Shortness of Breath. Rescue 18 g 1    albuterol-ipratropium (DUO-NEB) 2.5 mg-0.5 mg/3 mL nebulizer solution Take 3 mLs by nebulization every 6 (six) hours as needed for Wheezing. Rescue 150 mL 1    atorvastatin (LIPITOR) 20 MG tablet Take 1 tablet (20 mg total) by mouth once daily. 90 tablet 3    budesonide (PULMICORT) 0.5 mg/2 mL nebulizer solution Take 2 mLs (0.5 mg total) by nebulization every 12 (twelve) hours. 360 mL 1    fluticasone-salmeterol diskus inhaler 250-50 mcg Inhale 1 puff into the lungs 2 (two) times daily. Controller 60 each 11    ibuprofen (ADVIL,MOTRIN) 200 MG tablet Take 200 mg by mouth every 6 (six) hours as needed for Pain.      levothyroxine (SYNTHROID) 88 MCG tablet Take 1 tablet (88 mcg total) by mouth before breakfast. 90 tablet 0    loratadine-pseudoephedrine 5-120 mg (CLARITIN-D 12-HOUR) 5-120 mg per tablet Take 1 tablet by mouth 2 (two) times daily.      losartan-hydrochlorothiazide 100-25 mg (HYZAAR) 100-25 mg per tablet Take 1 tablet by mouth once daily. 90 tablet 3    montelukast (SINGULAIR) 10 mg tablet Take 1 tablet (10 mg total) by mouth every evening. 90 tablet 3    promethazine (PHENERGAN) 25 MG tablet Take 2 tablets (50 mg total) by mouth every 4 (four) hours as needed for Nausea. 1-2 tablet every 4 hours as needed 20 tablet 1    SUMAtriptan succinate 6 mg/0.5 mL Crtg Inject 0.5 mLs into the skin 2 (two) times daily as needed (HA). Take 1 injection at onset of migraine and can repeat in 1-2 hours if needed. No more than 2 doses in 24 hours. 9 each 5    doxycycline (VIBRA-TABS) 100 MG tablet Take 1 tablet (100 mg total) by mouth every 12 (twelve) hours. for 10 days 20 tablet 0     methylPREDNISolone (MEDROL DOSEPACK) 4 mg tablet Take as directed on package. Take with food. 21 tablet 0    sumatriptan (IMITREX) 100 MG tablet Take 0.5 tablets (50 mg total) by mouth every 2 (two) hours as needed for Migraine. May repeat dose in 2 hrs once in 24 hours.  Max dosage 200 mg in 24 hours. 27 tablet 3     No current facility-administered medications for this visit.       Labs:274}   I have reviewed labs below:    Lab Results   Component Value Date    WBC 4.71 04/24/2024    RBC 4.31 04/24/2024    HGB 12.0 04/24/2024    HCT 38.2 04/24/2024     04/24/2024     04/24/2024    K 4.1 04/24/2024     04/24/2024    CALCIUM 9.7 04/24/2024    GLU 99 04/24/2024    BUN 16 04/24/2024    CREATININE 0.95 04/24/2024    EGFRNONAA 56 (L) 06/06/2022    ALT 44 12/18/2023    AST 28 12/18/2023    CHOL 146 12/18/2023    TRIG 184 (H) 12/18/2023    HDL 43 12/18/2023    LDLCALC 66 12/18/2023    TSH 2.340 12/18/2023    HGBA1C 5.6 06/08/2023    MICROALBUR <0.5 06/08/2023       Point Of Care Testing (if applicable):  Lab Results   Component Value Date    [id number] Negative 12/31/2024    FLUAMOLEC Negative 12/31/2024    FLUBMOLEC Negative 12/31/2024    MOLSTREPAPOC Negative 11/14/2024     Any diagnostic testing results obtained in office or prior to appointment were reviewed were reviewed with patient.    Medical History: 274}     Past Medical History:   Diagnosis Date    Cholelithiasis     4/26/21 per US (report scanned in); HIDA scan 5/7/21 normal w/ EF49% (scanned in)    Chronic migraine without aura or status migrainosus     Hyperlipidemia     Hypertension     Hypothyroidism     Lumbar degenerative disc disease     Osteoarthritis of multiple joints     Spondylosis of cervical spine     Spondylosis, lumbosacral       Social History     Tobacco Use   Smoking Status Never    Passive exposure: Never   Smokeless Tobacco Never   Tobacco Comments    None      Objective:  274}   Vital Signs  Temp: 99.3 °F (37.4  "°C)  Temp Source: Oral  Pulse: 96  Resp: 18  SpO2: 96 %  BP: 113/81  BP Location: Left arm  Patient Position: Sitting  Pain Score: 0-No pain  Height and Weight  Height: 5' 8" (172.7 cm)  Weight: 109.7 kg (241 lb 12.8 oz)  BSA (Calculated - sq m): 2.29 sq meters  BMI (Calculated): 36.8  Weight in (lb) to have BMI = 25: 164.1    Over the last two weeks how often have you been bothered by little interest or pleasure in doing things: 0  Over the last two weeks how often have you been bothered by feeling down, depressed or hopeless: 0  PHQ-2 Total Score: 0  PHQ-9 Score: 1  PHQ-9 Interpretation: Minimal or None    Wt Readings from Last 3 Encounters:   12/31/24 109.7 kg (241 lb 12.8 oz)   11/14/24 113.9 kg (251 lb)   07/18/24 9.979 kg (22 lb)     Physical Exam  Vitals and nursing note reviewed.   Constitutional:       General: She is not in acute distress.     Appearance: Normal appearance. She is ill-appearing. She is not toxic-appearing or diaphoretic.   HENT:      Head: Normocephalic.      Right Ear: Hearing, tympanic membrane, ear canal and external ear normal.      Left Ear: Hearing, tympanic membrane, ear canal and external ear normal.      Nose: Mucosal edema, congestion and rhinorrhea present. Rhinorrhea is purulent.      Right Turbinates: Swollen.      Left Turbinates: Swollen.      Right Sinus: Maxillary sinus tenderness present. No frontal sinus tenderness.      Left Sinus: Maxillary sinus tenderness present. No frontal sinus tenderness.      Mouth/Throat:      Lips: Pink.      Mouth: Mucous membranes are moist.      Tongue: No lesions.      Pharynx: Uvula midline. No pharyngeal swelling, oropharyngeal exudate, posterior oropharyngeal erythema or uvula swelling.   Eyes:      Conjunctiva/sclera: Conjunctivae normal.      Pupils: Pupils are equal, round, and reactive to light.   Cardiovascular:      Rate and Rhythm: Normal rate and regular rhythm.      Heart sounds: Normal heart sounds.   Pulmonary:      Effort: " Pulmonary effort is normal. No respiratory distress.      Breath sounds: Normal breath sounds. No wheezing, rhonchi or rales.   Musculoskeletal:      Cervical back: No rigidity.   Lymphadenopathy:      Cervical: No cervical adenopathy.   Skin:     General: Skin is warm and dry.      Coloration: Skin is not jaundiced or pale.      Findings: No rash.   Neurological:      General: No focal deficit present.      Mental Status: She is alert and oriented to person, place, and time.   Psychiatric:         Mood and Affect: Mood normal.         Behavior: Behavior normal.         Thought Content: Thought content normal.         Judgment: Judgment normal.          Assessment and Plan: 274}       1. Acute non-recurrent maxillary sinusitis  -     POCT COVID-19 Rapid Screening  -     Cancel: POCT SARS-COV2 (COVID) with Flu Antigen  -     POCT Influenza A/B Molecular  -     dexAMETHasone injection 6 mg  -     cefTRIAXone injection 1 g  -     doxycycline (VIBRA-TABS) 100 MG tablet; Take 1 tablet (100 mg total) by mouth every 12 (twelve) hours. for 10 days  Dispense: 20 tablet; Refill: 0  -     methylPREDNISolone (MEDROL DOSEPACK) 4 mg tablet; Take as directed on package. Take with food.  Dispense: 21 tablet; Refill: 0    2. Chronic migraine without aura without status migrainosus, not intractable  -     sumatriptan (IMITREX) 100 MG tablet; Take 0.5 tablets (50 mg total) by mouth every 2 (two) hours as needed for Migraine. May repeat dose in 2 hrs once in 24 hours.  Max dosage 200 mg in 24 hours.  Dispense: 27 tablet; Refill: 3    3. Moderate persistent asthma without complication  -     dexAMETHasone injection 6 mg  -     methylPREDNISolone (MEDROL DOSEPACK) 4 mg tablet; Take as directed on package. Take with food.  Dispense: 21 tablet; Refill: 0    4. Upper respiratory tract infection, unspecified type        Assessment & Plan    IMPRESSION:  - Assessed patient with prolonged sinus infection symptoms and recent asthma diagnosis  -  Noted green, bloody nasal discharge and chest congestion for 2+ weeks  - Considered possibility of COVID-19 but deemed unlikely due to duration of symptoms  - Decided on aggressive treatment approach due to length and severity of symptoms  - Planned steroid and antibiotic therapy to address both upper and lower respiratory involvement    RESPIRATORY INFECTION (CHEST AND SINUS):  - Administered antibiotic shot (Rocephin) in office.  - Prescribed doxycycline for chest infection.  - Prescribed doxycycline for sinus infection.  - Administered antibiotic shot to cover sinus and chest infections.    ASTHMA/COPD EXACERBATION:  - Administered steroid injection in office.  - Prescribed methylprednisolone Dosepak, to begin the following morning.  - Continued Robitussin DM at bedtime as needed for cough.  - Increased nebulizer treatments to 3-4 times daily as needed for wheezing or chest congestion.  - Explained the rationale for avoiding cough suppressants during the day to promote mucus clearance.  - Instructed the patient to increase frequency of nebulizer treatments to 3-4 times daily if wheezing or chest congestion worsens.    MUCUS MANAGEMENT:  - Educated the patient about the importance of hydration when using mucolytics like Mucinex.  - Instructed the patient to increase water intake to help thin mucus.    MEDICATIONS/SUPPLEMENTS:  - Continued Claritin as previously prescribed.    HYPERTENSION:  - Inquired about patient's interest in a digital medicine program for hypertension management.  - Noted that the patient declined participation in the digital medicine program, stating they already receive phone calls.       Follow up for as scheduled and as needed.    Signature:  NELI Badillo-BC    Future Appointments   Date Time Provider Department Center   1/7/2025  9:00 AM Pita Ramos FNP [facility] [place]   1/16/2025  1:00 PM AWV NURSE, [facility] [place]    2/6/2025  1:00 PM Liset Ewing MD [facility]  PULM Chris [facility]   7[facility]24/2025 [facility] [place]     This note was generated with the assistance of ambient listening technology. Verbal consent was obtained by the patient and accompanying visitor(s) for the recording of patient appointment to facilitate this note. I attest to having reviewed and edited the generated note for accuracy, though some syntax or spelling errors may persist. Please contact the author of this note for any clarification.

## 2025-01-07 ENCOUNTER — OFFICE VISIT (OUTPATIENT)
Dept: FAMILY MEDICINE | Facility: CLINIC | Age: 70
End: 2025-01-07
Payer: MEDICARE

## 2025-01-07 VITALS
HEART RATE: 66 BPM | OXYGEN SATURATION: 97 % | TEMPERATURE: 98 F | WEIGHT: 241 LBS | SYSTOLIC BLOOD PRESSURE: 131 MMHG | BODY MASS INDEX: 36.53 KG/M2 | DIASTOLIC BLOOD PRESSURE: 78 MMHG | HEIGHT: 68 IN | RESPIRATION RATE: 18 BRPM

## 2025-01-07 DIAGNOSIS — E78.2 MIXED HYPERLIPIDEMIA: Chronic | ICD-10-CM

## 2025-01-07 DIAGNOSIS — E03.9 HYPOTHYROIDISM (ACQUIRED): Chronic | ICD-10-CM

## 2025-01-07 DIAGNOSIS — E66.01 SEVERE OBESITY (BMI 35.0-39.9) WITH COMORBIDITY: Chronic | ICD-10-CM

## 2025-01-07 DIAGNOSIS — I10 ESSENTIAL (PRIMARY) HYPERTENSION: Primary | Chronic | ICD-10-CM

## 2025-01-07 DIAGNOSIS — Z23 FLU VACCINE NEED: ICD-10-CM

## 2025-01-07 DIAGNOSIS — Z79.899 OTHER LONG TERM (CURRENT) DRUG THERAPY: ICD-10-CM

## 2025-01-07 PROBLEM — J02.9 SORE THROAT: Status: RESOLVED | Noted: 2024-11-14 | Resolved: 2025-01-07

## 2025-01-07 PROCEDURE — 1160F RVW MEDS BY RX/DR IN RCRD: CPT | Mod: ,,, | Performed by: NURSE PRACTITIONER

## 2025-01-07 PROCEDURE — 90653 IIV ADJUVANT VACCINE IM: CPT | Mod: ,,, | Performed by: NURSE PRACTITIONER

## 2025-01-07 PROCEDURE — 3288F FALL RISK ASSESSMENT DOCD: CPT | Mod: ,,, | Performed by: NURSE PRACTITIONER

## 2025-01-07 PROCEDURE — 3008F BODY MASS INDEX DOCD: CPT | Mod: ,,, | Performed by: NURSE PRACTITIONER

## 2025-01-07 PROCEDURE — 3078F DIAST BP <80 MM HG: CPT | Mod: ,,, | Performed by: NURSE PRACTITIONER

## 2025-01-07 PROCEDURE — 99214 OFFICE O/P EST MOD 30 MIN: CPT | Mod: 25,,, | Performed by: NURSE PRACTITIONER

## 2025-01-07 PROCEDURE — 1126F AMNT PAIN NOTED NONE PRSNT: CPT | Mod: ,,, | Performed by: NURSE PRACTITIONER

## 2025-01-07 PROCEDURE — 1101F PT FALLS ASSESS-DOCD LE1/YR: CPT | Mod: ,,, | Performed by: NURSE PRACTITIONER

## 2025-01-07 PROCEDURE — 1159F MED LIST DOCD IN RCRD: CPT | Mod: ,,, | Performed by: NURSE PRACTITIONER

## 2025-01-07 PROCEDURE — 3075F SYST BP GE 130 - 139MM HG: CPT | Mod: ,,, | Performed by: NURSE PRACTITIONER

## 2025-01-07 PROCEDURE — G0008 ADMIN INFLUENZA VIRUS VAC: HCPCS | Mod: ,,, | Performed by: NURSE PRACTITIONER

## 2025-01-07 NOTE — ASSESSMENT & PLAN NOTE
Recently unintentionally lost 10 lb due to illness and encouraged to use this as a jump start to continue her weight loss efforts.

## 2025-01-07 NOTE — PROGRESS NOTES
[facility]  [address] [place] [address]  Phone: [phone]  Fax: [phone]       PATIENT NAME: Dominic Casas   : 1955    AGE: 69 y.o. DATE OF ENCOUNTER: 25    MRN: [id number]      PCP: Pita Ramos FNP    Subjective:   CHANGE CHIEF COMPLAINT      :[id number]}274}  Chief Complaint   Patient presents with    Hypertension    Hyperlipidemia    Hypothyroidism    Follow-up     Patient presents to clinic for 6 month follow up of HTN, HLD, and hypothyroidism.  Patient will come in the morning for fasting labs.     History of Present Illness    HPI:  Patient recovering from a sinus infection treated last week with two injections and a Medrol pack and doxycycline, reporting significant symptom improvement. She notes improved management of migraines with a prolonged period without a severe episode. Her blood pressure was measured at 131/78 during the visit, described as satisfactory. Patient has lost 10 lbs unintentionally, attributed to decreased appetite during her recent illness. She reports no recent asthma issues but mentions an upcoming pulmonology follow-up visit. Patient has not received her flu vaccine for the season, initially planning to get it but postponing due to illness. She denies any current issues with blood pressure fluctuations or thyroid function, and has not received the COVID-19 vaccine.      ROS:  Constitutional: +weight loss, -change in weight  Head: -headaches  Respiratory: -cough         Allergies and Meds: 274}     Review of patient's allergies indicates:   Allergen Reactions    Codeine Hives and Itching        Current Outpatient Medications   Medication Sig Dispense Refill    albuterol (VENTOLIN HFA) 90 mcg/actuation inhaler Inhale 2 puffs into the lungs every 4 (four) hours as needed for Wheezing or Shortness of Breath. Rescue 18 g 1    albuterol-ipratropium (DUO-NEB) 2.5 mg-0.5 mg/3 mL nebulizer solution Take 3 mLs by nebulization  every 6 (six) hours as needed for Wheezing. Rescue 150 mL 1    atorvastatin (LIPITOR) 20 MG tablet Take 1 tablet (20 mg total) by mouth once daily. 90 tablet 3    budesonide (PULMICORT) 0.5 mg/2 mL nebulizer solution Take 2 mLs (0.5 mg total) by nebulization every 12 (twelve) hours. 360 mL 1    doxycycline (VIBRA-TABS) 100 MG tablet Take 1 tablet (100 mg total) by mouth every 12 (twelve) hours. for 10 days 20 tablet 0    fluticasone-salmeterol diskus inhaler 250-50 mcg Inhale 1 puff into the lungs 2 (two) times daily. Controller 60 each 11    ibuprofen (ADVIL,MOTRIN) 200 MG tablet Take 200 mg by mouth every 6 (six) hours as needed for Pain.      levothyroxine (SYNTHROID) 88 MCG tablet Take 1 tablet (88 mcg total) by mouth before breakfast. 90 tablet 0    loratadine-pseudoephedrine 5-120 mg (CLARITIN-D 12-HOUR) 5-120 mg per tablet Take 1 tablet by mouth 2 (two) times daily.      losartan-hydrochlorothiazide 100-25 mg (HYZAAR) 100-25 mg per tablet Take 1 tablet by mouth once daily. 90 tablet 3    montelukast (SINGULAIR) 10 mg tablet Take 1 tablet (10 mg total) by mouth every evening. 90 tablet 3    promethazine (PHENERGAN) 25 MG tablet Take 2 tablets (50 mg total) by mouth every 4 (four) hours as needed for Nausea. 1-2 tablet every 4 hours as needed 20 tablet 1    sumatriptan (IMITREX) 100 MG tablet Take 0.5 tablets (50 mg total) by mouth every 2 (two) hours as needed for Migraine. May repeat dose in 2 hrs once in 24 hours.  Max dosage 200 mg in 24 hours. 27 tablet 3    SUMAtriptan succinate 6 mg/0.5 mL Crtg Inject 0.5 mLs into the skin 2 (two) times daily as needed (HA). Take 1 injection at onset of migraine and can repeat in 1-2 hours if needed. No more than 2 doses in 24 hours. 9 each 5     No current facility-administered medications for this visit.       Labs:274}   I have reviewed labs below:    Lab Results   Component Value Date    WBC 4.71 04/24/2024    RBC 4.31 04/24/2024    HGB 12.0 04/24/2024    HCT 38.2  04/24/2024     04/24/2024     04/24/2024    K 4.1 04/24/2024     04/24/2024    CALCIUM 9.7 04/24/2024    GLU 99 04/24/2024    BUN 16 04/24/2024    CREATININE 0.95 04/24/2024    EGFRNONAA 56 (L) 06/06/2022    ALT 44 12/18/2023    AST 28 12/18/2023    CHOL 146 12/18/2023    TRIG 184 (H) 12/18/2023    HDL 43 12/18/2023    LDLCALC 66 12/18/2023    TSH 2.340 12/18/2023    HGBA1C 5.6 06/08/2023    MICROALBUR <0.5 06/08/2023       Point Of Care Testing (if applicable):  Lab Results   Component Value Date    NUW30CTQOVEX Negative 12/31/2024    FLUAMOLEC Negative 12/31/2024    FLUBMOLEC Negative 12/31/2024    MOLSTREPAPOC Negative 11/14/2024     Any diagnostic testing results obtained in office or prior to appointment were reviewed were reviewed with patient.    Medical History: 274}     Past Medical History:   Diagnosis Date    Cholelithiasis     4/26/21 per US (report scanned in); HIDA scan 5/7/21 normal w/ EF49% (scanned in)    Chronic migraine without aura or status migrainosus     Hyperlipidemia     Hypertension     Hypothyroidism     Lumbar degenerative disc disease     Osteoarthritis of multiple joints     Spondylosis of cervical spine     Spondylosis, lumbosacral       Social History     Tobacco Use   Smoking Status Never    Passive exposure: Never   Smokeless Tobacco Never   Tobacco Comments    None      Past Surgical History:   Procedure Laterality Date    JOINT REPLACEMENT  7/15, 10/17, 7/19    Right knee, both hips    PARTIAL KNEE ARTHROPLASTY Right 06/2017    Dr. Jacobs    SHOULDER ARTHROSCOPY Left 07/2012    left rotator cuff repair    TOTAL HIP ARTHROPLASTY Left 09/2017    TOTAL HIP ARTHROPLASTY Right 10/07/2019    Dr. [place]        Health Maintenance:      Health Maintenance Topics with due status: Not Due       Topic Last Completion Date    Pap Smear 04/25/2022    Hemoglobin A1c (Diabetic Prevention Screening) 06/08/2023    Colorectal Cancer Screening 06/24/2024    TETANUS VACCINE  "07/15/2024    Mammogram 07/18/2024    DEXA Scan 07/18/2024       Objective:  274}   Vital Signs  Temp: 98.2 °F (36.8 °C)  Temp Source: Oral  Pulse: 66  Resp: 18  SpO2: 97 %  BP: 131/78  BP Location: Left arm  Patient Position: Sitting  Pain Score: 0-No pain  Height and Weight  Height: 5' 8" (172.7 cm)  Weight: 109.3 kg (241 lb)  BSA (Calculated - sq m): 2.29 sq meters  BMI (Calculated): 36.7  Weight in (lb) to have BMI = 25: 164.1    Over the last two weeks how often have you been bothered by little interest or pleasure in doing things: 0  Over the last two weeks how often have you been bothered by feeling down, depressed or hopeless: 0  PHQ-2 Total Score: 0  PHQ-9 Score: 1  PHQ-9 Interpretation: Minimal or None    Wt Readings from Last 3 Encounters:   01/07/25 109.3 kg (241 lb)   12/31/24 109.7 kg (241 lb 12.8 oz)   11/14/24 113.9 kg (251 lb)     Physical Exam  Vitals and nursing note reviewed.   Constitutional:       General: She is not in acute distress.     Appearance: Normal appearance. She is obese. She is not ill-appearing.   HENT:      Head: Normocephalic.      Right Ear: Tympanic membrane, ear canal and external ear normal.      Left Ear: Tympanic membrane, ear canal and external ear normal.      Nose: Nose normal.      Mouth/Throat:      Mouth: Mucous membranes are moist.      Pharynx: Oropharynx is clear. Uvula midline. No posterior oropharyngeal erythema or uvula swelling.   Eyes:      Conjunctiva/sclera: Conjunctivae normal.      Pupils: Pupils are equal, round, and reactive to light.   Neck:      Thyroid: No thyroid mass or thyromegaly.      Vascular: Normal carotid pulses. No carotid bruit.   Cardiovascular:      Rate and Rhythm: Normal rate and regular rhythm.      Pulses: Normal pulses.      Heart sounds: Normal heart sounds.   Pulmonary:      Effort: Pulmonary effort is normal. No respiratory distress.      Breath sounds: Normal breath sounds. No wheezing, rhonchi or rales.   Abdominal:      " Palpations: Abdomen is soft.      Tenderness: There is no abdominal tenderness.   Musculoskeletal:      Cervical back: Neck supple.      Right lower leg: No edema.      Left lower leg: No edema.   Lymphadenopathy:      Cervical: No cervical adenopathy.   Skin:     General: Skin is warm and dry.      Coloration: Skin is not jaundiced or pale.   Neurological:      General: No focal deficit present.      Mental Status: She is alert and oriented to person, place, and time.      Gait: Gait normal.   Psychiatric:         Mood and Affect: Mood normal.         Behavior: Behavior normal.          Assessment and Plan: 274}       1. Essential (primary) hypertension  -     CBC Auto Differential; Future; Expected date: 01/07/2025  -     Comprehensive Metabolic Panel; Future; Expected date: 01/07/2025    2. Mixed hyperlipidemia  -     Comprehensive Metabolic Panel; Future; Expected date: 01/07/2025  -     Lipid Panel; Future; Expected date: 01/07/2025    3. Hypothyroidism (acquired)  -     TSH; Future; Expected date: 01/07/2025    4. Severe obesity (BMI 35.0-39.9) with comorbidity  Assessment & Plan:  Recently unintentionally lost 10 lb due to illness and encouraged to use this as a jump start to continue her weight loss efforts.    Orders:  -     Hemoglobin A1C; Future; Expected date: 01/07/2025    5. Other long term (current) drug therapy  -     CBC Auto Differential; Future; Expected date: 01/07/2025  -     Comprehensive Metabolic Panel; Future; Expected date: 01/07/2025  -     Hemoglobin A1C; Future; Expected date: 01/07/2025    6. Flu vaccine need  -     influenza (adjuvanted) (Fluad) 45 mcg/0.5 mL IM vaccine (> or = 64 yo) 0.5 mL        Assessment & Plan    IMPRESSION:  - Evaluated blood pressure control, currently well-managed at 131/78 on Losartan/HCTZ 100/25mg daily  - Assessed recent weight loss of 10 lbs, considering as potential starting point for further lifestyle modifications  - Reviewed thyroid function and lipid  management, awaiting lab results to determine if adjustments needed  - Noted improvement in recent sinus infection following treatment with injections and Medrol pack  - Assessed migraine status, noting good control without recent severe episodes  - Evaluated asthma management, confirming upcoming pulmonary follow-up    HYPERTENSION:  - Measured blood pressure at 131/78, which is well-managed and looking good.  - Continued Losartan/HCTZ 100/25mg daily for blood pressure management.  - Discussed digital medicine program for high blood pressure management but patient declines.  - Advised the patient to work on healthier diet and lifestyle changes.    HYPERLIPIDEMIA:  - Continued Atorvastatin 20mg daily for lipid management.  - Ordered labs to reevaluate lipids.  - Will review lab results and make changes if necessary.    THYROID DISORDER:  - Continued Levothyroxine 88mcg daily for thyroid management.  - Ordered fasting labs to assess thyroid function.  - Will review lab results and make adjustments if necessary.    SINUS INFECTION:  - Followed up on recent sinus infection treatment.  - Patient reports significant improvement in sinus infection.  - Advised to complete doxycycline.    ASTHMA:  - Keep scheduled follow-up visits with pulmonology for asthma management.    MIGRAINE:  - Inquired about the patient's migraine status.  - Patient reports improvement in migraine frequency and severity.    WEIGHT MANAGEMENT:  - Discussed importance of maintaining long-term lifestyle changes for weight management and overall health.  - Patient to continue efforts to maintain healthier diet.  - Patient to focus on portion control.  - Patient to make better food choices.  - Patient to increase water intake.  - Patient to avoid late-night eating.  - Recommend increasing physical activity.       Follow up for as scheduled and as needed.  6 mths for HTN    Signature:  NELI Badillo-BC    Future Appointments   Date Time  Provider Department Center   1/16/2025  1:00 PM AWV NURSE, [facility] FAMILY MEDICINE [facility] STEPHANIE [place]son [place]   2/6/2025  1:00 PM Liset Ewing MD [facility]  PULM [place] [facility]   7/8/2025 10:20 AM Pita Ramos FNP [facility] WILM[place]ONALISA [place]   7/24/2025 10:20 AM [facility] [place]     This note was generated with the assistance of ambient listening technology. Verbal consent was obtained by the patient and accompanying visitor(s) for the recording of patient appointment to facilitate this note. I attest to having reviewed and edited the generated note for accuracy, though some syntax or spelling errors may persist. Please contact the author of this note for any clarification.

## 2025-01-09 NOTE — PROGRESS NOTES
" [facility]  [address] [place] [address]  Phone: [phone]  Fax: [phone]     PATIENT NAME: Dominic Casas   : 1955    AGE: 69 y.o. DATE OF ENCOUNTER: 25    Provider:    MRN: [id number]      Dominic Casas presented for a follow-up Medicare AWV today. The following components were reviewed and updated:    Medical history  Family History  Social history  Allergies and Current Medications  Health Risk Assessment  Health Maintenance  Care Team    **See Completed Assessments for Annual Wellness visit with in the encounter summary    The following assessments were completed:  Depression Screening  Cognitive function Screening  Timed Get Up Test  Whisper Test      Opioid documentation:      Patient does not have a current opioid prescription.          Vitals:    25 1304   BP: 130/80   BP Location: Right arm   Patient Position: Sitting   Pulse: 80   Resp: 16   Temp: 98.2 °F (36.8 °C)   TempSrc: Oral   SpO2: 96%   Weight: 111.6 kg (246 lb)   Height: 5' 8" (1.727 m)     Body mass index is 37.4 kg/m².       Physical Exam  Vitals and nursing note reviewed.   Constitutional:       General: She is not in acute distress.     Appearance: Normal appearance. She is not ill-appearing.   HENT:      Head: Normocephalic.   Eyes:      Conjunctiva/sclera: Conjunctivae normal.   Cardiovascular:      Rate and Rhythm: Normal rate and regular rhythm.      Heart sounds: Normal heart sounds.   Pulmonary:      Effort: Pulmonary effort is normal. No respiratory distress.      Breath sounds: Normal breath sounds. No wheezing, rhonchi or rales.   Skin:     General: Skin is warm and dry.      Coloration: Skin is not jaundiced or pale.   Neurological:      Mental Status: She is alert and oriented to person, place, and time.      Gait: Gait normal.   Psychiatric:         Mood and Affect: Mood normal.         Behavior: Behavior normal.         Thought Content: Thought content normal.         " Judgment: Judgment normal.           Diagnoses and health risks identified today and associated recommendations/orders:  1. Encounter for subsequent annual wellness visit (AWV) in Medicare patient    2. Essential (primary) hypertension  Assessment & Plan:  Fairly well controlled, optimal < 130/80.  Continue losartan hctz.      3. Mixed hyperlipidemia  Assessment & Plan:  Lab Results   Component Value Date    CHOL 145 01/08/2025    CHOL 146 12/18/2023    CHOL 133 06/08/2023     Lab Results   Component Value Date    HDL 45 01/08/2025    HDL 43 12/18/2023    HDL 41 06/08/2023     Lab Results   Component Value Date    LDLCALC 66 01/08/2025    LDLCALC 66 12/18/2023    LDLCALC 50 06/08/2023     Lab Results   Component Value Date    TRIG 168 (H) 01/08/2025    TRIG 184 (H) 12/18/2023    TRIG 208 (H) 06/08/2023       Lab Results   Component Value Date    CHOLHDL 3.2 01/08/2025    CHOLHDL 3.4 12/18/2023    CHOLHDL 3.2 06/08/2023     Controlled  Continue atorvastatin 20 mg daily.      4. Hypothyroidism (acquired)  Assessment & Plan:  Lab Results   Component Value Date    TSH 2.371 01/08/2025     Continue levothyroxine.      5. Moderate persistent asthma without complication  Assessment & Plan:  Stable, continue f/u with pulmonology and treatment as prescribed.      6. Severe obesity (BMI 35.0-39.9) with comorbidity  Assessment & Plan:  Body mass index is 37.4 kg/m². Morbid obesity complicates all aspects of disease management from diagnostic modalities to treatment. Weight loss encouraged and health benefits explained to patient.         7. BMI 37.0-37.9, adult         Provided Dominic with a 5-10 year written screening schedule and personal prevention plan. Recommendations were developed using the USPSTF age appropriate recommendations. Education, counseling, and referrals were provided as needed.  After Visit Summary printed and given to patient which includes a list of additional screenings\tests needed.      Follow up in  about 1 year (around 1/16/2026) for Medicare AWV, and otherwise follow-up as routinely scheduled.    Signature:  NELI Badillo-BC    Future Appointments   Date Time Provider Department Center   2/6/2025  1:00 PM Liset Ewing MD [facility]  PULM [facility]   7/8/2025 10:20 AM Pita Ramos FNP [facility] STEPHANIE [place]   7/24/2025 10:20 AM [facility] MAMMO1 [facility] [place]   1/22/2026  2:00 PM AWV NURSE, [facility] FAMILY MEDICINE [facility] Haresh [place]       Covid vaccine - declined.    I offered to discuss advanced care planning, including how to pick a person who would make decisions for you if you were unable to make them for yourself, called a health care power of , and what kind of decisions you might make such as use of life sustaining treatments such as ventilators and tube feeding when faced with a life limiting illness recorded on a living will that they will need to know. (How you want to be cared for as you near the end of your natural life)     X  Patient is unwilling to engage in a discussion regarding advance directives at this time.

## 2025-01-12 DIAGNOSIS — E03.9 HYPOTHYROIDISM (ACQUIRED): Chronic | ICD-10-CM

## 2025-01-12 RX ORDER — LEVOTHYROXINE SODIUM 88 UG/1
88 TABLET ORAL
Qty: 90 TABLET | Refills: 3 | Status: SHIPPED | OUTPATIENT
Start: 2025-01-12

## 2025-01-16 ENCOUNTER — OFFICE VISIT (OUTPATIENT)
Dept: FAMILY MEDICINE | Facility: CLINIC | Age: 70
End: 2025-01-16
Payer: MEDICARE

## 2025-01-16 VITALS
OXYGEN SATURATION: 96 % | DIASTOLIC BLOOD PRESSURE: 80 MMHG | HEART RATE: 80 BPM | SYSTOLIC BLOOD PRESSURE: 130 MMHG | WEIGHT: 246 LBS | RESPIRATION RATE: 16 BRPM | HEIGHT: 68 IN | TEMPERATURE: 98 F | BODY MASS INDEX: 37.28 KG/M2

## 2025-01-16 DIAGNOSIS — I10 ESSENTIAL (PRIMARY) HYPERTENSION: Chronic | ICD-10-CM

## 2025-01-16 DIAGNOSIS — J45.40 MODERATE PERSISTENT ASTHMA WITHOUT COMPLICATION: Chronic | ICD-10-CM

## 2025-01-16 DIAGNOSIS — E78.2 MIXED HYPERLIPIDEMIA: Chronic | ICD-10-CM

## 2025-01-16 DIAGNOSIS — E66.01 SEVERE OBESITY (BMI 35.0-39.9) WITH COMORBIDITY: Chronic | ICD-10-CM

## 2025-01-16 DIAGNOSIS — Z00.00 ENCOUNTER FOR SUBSEQUENT ANNUAL WELLNESS VISIT (AWV) IN MEDICARE PATIENT: Primary | ICD-10-CM

## 2025-01-16 DIAGNOSIS — E03.9 HYPOTHYROIDISM (ACQUIRED): Chronic | ICD-10-CM

## 2025-01-16 PROBLEM — J22 LOWER RESPIRATORY TRACT INFECTION: Status: RESOLVED | Noted: 2024-11-14 | Resolved: 2025-01-16

## 2025-01-16 PROBLEM — R06.2 WHEEZING: Status: RESOLVED | Noted: 2024-11-14 | Resolved: 2025-01-16

## 2025-01-16 PROBLEM — J01.00 ACUTE NON-RECURRENT MAXILLARY SINUSITIS: Status: RESOLVED | Noted: 2024-12-31 | Resolved: 2025-01-16

## 2025-01-16 PROBLEM — R05.1 ACUTE COUGH: Status: RESOLVED | Noted: 2024-11-14 | Resolved: 2025-01-16

## 2025-01-16 PROCEDURE — 3044F HG A1C LEVEL LT 7.0%: CPT | Mod: ,,, | Performed by: NURSE PRACTITIONER

## 2025-01-16 PROCEDURE — 3075F SYST BP GE 130 - 139MM HG: CPT | Mod: ,,, | Performed by: NURSE PRACTITIONER

## 2025-01-16 PROCEDURE — 1126F AMNT PAIN NOTED NONE PRSNT: CPT | Mod: ,,, | Performed by: NURSE PRACTITIONER

## 2025-01-16 PROCEDURE — 1170F FXNL STATUS ASSESSED: CPT | Mod: ,,, | Performed by: NURSE PRACTITIONER

## 2025-01-16 PROCEDURE — 3079F DIAST BP 80-89 MM HG: CPT | Mod: ,,, | Performed by: NURSE PRACTITIONER

## 2025-01-16 PROCEDURE — 1124F ACP DISCUSS-NO DSCNMKR DOCD: CPT | Mod: ,,, | Performed by: NURSE PRACTITIONER

## 2025-01-16 PROCEDURE — 1101F PT FALLS ASSESS-DOCD LE1/YR: CPT | Mod: ,,, | Performed by: NURSE PRACTITIONER

## 2025-01-16 PROCEDURE — G0439 PPPS, SUBSEQ VISIT: HCPCS | Mod: ,,, | Performed by: NURSE PRACTITIONER

## 2025-01-16 PROCEDURE — 1159F MED LIST DOCD IN RCRD: CPT | Mod: ,,, | Performed by: NURSE PRACTITIONER

## 2025-01-16 PROCEDURE — 3288F FALL RISK ASSESSMENT DOCD: CPT | Mod: ,,, | Performed by: NURSE PRACTITIONER

## 2025-01-16 NOTE — PATIENT INSTRUCTIONS
Counseling and Referral of Other Preventative  (Italic type indicates deductible and co-insurance are waived)    Patient Name: Dominic Casas  Today's Date: 1/16/2025    Health Maintenance       Date Due Completion Date    COVID-19 Vaccine (4 - 2024-25 season) 09/01/2024 10/27/2021    Pap Smear 04/25/2025 4/25/2022    Override on 8/2/2016: Done (Dr. Arrington- Negative. Scanned into documents.)    Mammogram 07/18/2025 7/18/2024    Override on 2/15/2016: Done ([facility]- Negative. Repeat yearly. Scanned into documents.)    Lipid Panel 01/08/2026 1/8/2025    Override on 11/19/2020: Done    Colorectal Cancer Screening 06/24/2027 6/24/2024    DEXA Scan 07/18/2027 7/18/2024    Override on 12/29/2010: Done    Hemoglobin A1c (Diabetic Prevention Screening) 01/08/2028 1/8/2025    TETANUS VACCINE 07/15/2034 7/15/2024        No orders of the defined types were placed in this encounter.    The following information is provided to all patients.  This information is to help you find resources for any of the problems found today that may be affecting your health:                  Living healthy guide: [place].gov    Understanding Diabetes: www.diabetes.org      Eating healthy: www.cdc.gov/healthyweight      CDC home safety checklist: www.cdc.gov/steadi/patient.html      Agency on Aging: [place].gov    Alcoholics anonymous (AA): www.aa.org      Physical Activity: www.margarette.nih.gov/ch9ijpr      Tobacco use: [place].gov

## 2025-01-16 NOTE — ASSESSMENT & PLAN NOTE
Lab Results   Component Value Date    CHOL 145 01/08/2025    CHOL 146 12/18/2023    CHOL 133 06/08/2023     Lab Results   Component Value Date    HDL 45 01/08/2025    HDL 43 12/18/2023    HDL 41 06/08/2023     Lab Results   Component Value Date    LDLCALC 66 01/08/2025    LDLCALC 66 12/18/2023    LDLCALC 50 06/08/2023     Lab Results   Component Value Date    TRIG 168 (H) 01/08/2025    TRIG 184 (H) 12/18/2023    TRIG 208 (H) 06/08/2023       Lab Results   Component Value Date    CHOLHDL 3.2 01/08/2025    CHOLHDL 3.4 12/18/2023    CHOLHDL 3.2 06/08/2023     Controlled  Continue atorvastatin 20 mg daily.

## 2025-01-16 NOTE — ASSESSMENT & PLAN NOTE
Body mass index is 37.4 kg/m². Morbid obesity complicates all aspects of disease management from diagnostic modalities to treatment. Weight loss encouraged and health benefits explained to patient.

## 2025-01-31 ENCOUNTER — EXTERNAL CHRONIC CARE MANAGEMENT (OUTPATIENT)
Dept: FAMILY MEDICINE | Facility: CLINIC | Age: 70
End: 2025-01-31
Payer: MEDICARE

## 2025-01-31 PROCEDURE — G0511 CCM/BHI BY RHC/FQHC 20MIN MO: HCPCS | Mod: ,,, | Performed by: NURSE PRACTITIONER

## 2025-02-28 ENCOUNTER — EXTERNAL CHRONIC CARE MANAGEMENT (OUTPATIENT)
Dept: FAMILY MEDICINE | Facility: CLINIC | Age: 70
End: 2025-02-28
Payer: MEDICARE

## 2025-02-28 PROCEDURE — G0511 CCM/BHI BY RHC/FQHC 20MIN MO: HCPCS | Mod: ,,, | Performed by: NURSE PRACTITIONER

## 2025-03-12 ENCOUNTER — OFFICE VISIT (OUTPATIENT)
Dept: FAMILY MEDICINE | Facility: CLINIC | Age: 70
End: 2025-03-12
Payer: MEDICARE

## 2025-03-12 VITALS
RESPIRATION RATE: 18 BRPM | HEIGHT: 68 IN | HEART RATE: 72 BPM | WEIGHT: 247 LBS | TEMPERATURE: 98 F | BODY MASS INDEX: 37.44 KG/M2 | SYSTOLIC BLOOD PRESSURE: 136 MMHG | DIASTOLIC BLOOD PRESSURE: 82 MMHG | OXYGEN SATURATION: 95 %

## 2025-03-12 DIAGNOSIS — J45.40 MODERATE PERSISTENT ASTHMA WITHOUT COMPLICATION: Chronic | ICD-10-CM

## 2025-03-12 DIAGNOSIS — J01.00 ACUTE NON-RECURRENT MAXILLARY SINUSITIS: Primary | ICD-10-CM

## 2025-03-12 DIAGNOSIS — G43.709 CHRONIC MIGRAINE WITHOUT AURA WITHOUT STATUS MIGRAINOSUS, NOT INTRACTABLE: Chronic | ICD-10-CM

## 2025-03-12 PROCEDURE — 1159F MED LIST DOCD IN RCRD: CPT | Mod: ,,, | Performed by: NURSE PRACTITIONER

## 2025-03-12 PROCEDURE — 3044F HG A1C LEVEL LT 7.0%: CPT | Mod: ,,, | Performed by: NURSE PRACTITIONER

## 2025-03-12 PROCEDURE — 87635 SARS-COV-2 COVID-19 AMP PRB: CPT | Mod: QW,,, | Performed by: NURSE PRACTITIONER

## 2025-03-12 PROCEDURE — 1125F AMNT PAIN NOTED PAIN PRSNT: CPT | Mod: ,,, | Performed by: NURSE PRACTITIONER

## 2025-03-12 PROCEDURE — 87502 INFLUENZA DNA AMP PROBE: CPT | Mod: QW,,, | Performed by: NURSE PRACTITIONER

## 2025-03-12 PROCEDURE — 99214 OFFICE O/P EST MOD 30 MIN: CPT | Mod: 25,,, | Performed by: NURSE PRACTITIONER

## 2025-03-12 PROCEDURE — 3008F BODY MASS INDEX DOCD: CPT | Mod: ,,, | Performed by: NURSE PRACTITIONER

## 2025-03-12 PROCEDURE — 87651 STREP A DNA AMP PROBE: CPT | Mod: QW,,, | Performed by: NURSE PRACTITIONER

## 2025-03-12 PROCEDURE — 3079F DIAST BP 80-89 MM HG: CPT | Mod: ,,, | Performed by: NURSE PRACTITIONER

## 2025-03-12 PROCEDURE — 96372 THER/PROPH/DIAG INJ SC/IM: CPT | Mod: ,,, | Performed by: NURSE PRACTITIONER

## 2025-03-12 PROCEDURE — 3075F SYST BP GE 130 - 139MM HG: CPT | Mod: ,,, | Performed by: NURSE PRACTITIONER

## 2025-03-12 PROCEDURE — 1160F RVW MEDS BY RX/DR IN RCRD: CPT | Mod: ,,, | Performed by: NURSE PRACTITIONER

## 2025-03-12 RX ORDER — CEFTRIAXONE 1 G/1
1 INJECTION, POWDER, FOR SOLUTION INTRAMUSCULAR; INTRAVENOUS
Status: COMPLETED | OUTPATIENT
Start: 2025-03-12 | End: 2025-03-12

## 2025-03-12 RX ORDER — AMOXICILLIN AND CLAVULANATE POTASSIUM 875; 125 MG/1; MG/1
1 TABLET, FILM COATED ORAL EVERY 12 HOURS
Qty: 20 TABLET | Refills: 0 | Status: SHIPPED | OUTPATIENT
Start: 2025-03-12 | End: 2025-03-22

## 2025-03-12 RX ORDER — PREDNISONE 20 MG/1
TABLET ORAL
Qty: 9 TABLET | Refills: 0 | Status: SHIPPED | OUTPATIENT
Start: 2025-03-12

## 2025-03-12 RX ORDER — DEXAMETHASONE SODIUM PHOSPHATE 4 MG/ML
6 INJECTION, SOLUTION INTRA-ARTICULAR; INTRALESIONAL; INTRAMUSCULAR; INTRAVENOUS; SOFT TISSUE
Status: COMPLETED | OUTPATIENT
Start: 2025-03-12 | End: 2025-03-12

## 2025-03-12 RX ADMIN — DEXAMETHASONE SODIUM PHOSPHATE 6 MG: 4 INJECTION, SOLUTION INTRA-ARTICULAR; INTRALESIONAL; INTRAMUSCULAR; INTRAVENOUS; SOFT TISSUE at 08:03

## 2025-03-12 RX ADMIN — CEFTRIAXONE 1 G: 1 INJECTION, POWDER, FOR SOLUTION INTRAMUSCULAR; INTRAVENOUS at 08:03
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
## 2025-03-31 ENCOUNTER — EXTERNAL CHRONIC CARE MANAGEMENT (OUTPATIENT)
Dept: FAMILY MEDICINE | Facility: CLINIC | Age: 70
End: 2025-03-31
Payer: MEDICARE

## 2025-03-31 PROCEDURE — G0511 CCM/BHI BY RHC/FQHC 20MIN MO: HCPCS | Mod: ,,, | Performed by: NURSE PRACTITIONER

## 2025-04-30 ENCOUNTER — EXTERNAL CHRONIC CARE MANAGEMENT (OUTPATIENT)
Dept: FAMILY MEDICINE | Facility: CLINIC | Age: 70
End: 2025-04-30
Payer: MEDICARE

## 2025-04-30 PROCEDURE — 99490 CHRNC CARE MGMT STAFF 1ST 20: CPT | Mod: ,,, | Performed by: NURSE PRACTITIONER

## 2025-05-31 ENCOUNTER — EXTERNAL CHRONIC CARE MANAGEMENT (OUTPATIENT)
Dept: FAMILY MEDICINE | Facility: CLINIC | Age: 70
End: 2025-05-31
Payer: MEDICARE

## 2025-05-31 PROCEDURE — 99490 CHRNC CARE MGMT STAFF 1ST 20: CPT | Mod: ,,, | Performed by: NURSE PRACTITIONER

## 2025-06-27 DIAGNOSIS — J45.40 MODERATE PERSISTENT ASTHMA WITHOUT COMPLICATION: ICD-10-CM

## 2025-06-27 RX ORDER — FLUTICASONE PROPIONATE AND SALMETEROL 250; 50 UG/1; UG/1
1 POWDER RESPIRATORY (INHALATION) 2 TIMES DAILY
Qty: 60 EACH | Refills: 11 | Status: SHIPPED | OUTPATIENT
Start: 2025-06-27

## 2025-06-30 ENCOUNTER — EXTERNAL CHRONIC CARE MANAGEMENT (OUTPATIENT)
Dept: FAMILY MEDICINE | Facility: CLINIC | Age: 70
End: 2025-06-30
Payer: MEDICARE

## 2025-06-30 PROCEDURE — 99490 CHRNC CARE MGMT STAFF 1ST 20: CPT | Mod: ,,, | Performed by: NURSE PRACTITIONER

## 2025-07-08 ENCOUNTER — OFFICE VISIT (OUTPATIENT)
Dept: FAMILY MEDICINE | Facility: CLINIC | Age: 70
End: 2025-07-08
Payer: MEDICARE

## 2025-07-08 VITALS
TEMPERATURE: 99 F | RESPIRATION RATE: 20 BRPM | WEIGHT: 244.63 LBS | HEART RATE: 62 BPM | OXYGEN SATURATION: 95 % | BODY MASS INDEX: 37.07 KG/M2 | DIASTOLIC BLOOD PRESSURE: 74 MMHG | SYSTOLIC BLOOD PRESSURE: 120 MMHG | HEIGHT: 68 IN

## 2025-07-08 DIAGNOSIS — R73.03 PREDIABETES: ICD-10-CM

## 2025-07-08 DIAGNOSIS — I10 ESSENTIAL (PRIMARY) HYPERTENSION: Chronic | ICD-10-CM

## 2025-07-08 DIAGNOSIS — J31.0 CHRONIC RHINITIS: Chronic | ICD-10-CM

## 2025-07-08 DIAGNOSIS — E78.2 MIXED HYPERLIPIDEMIA: Chronic | ICD-10-CM

## 2025-07-08 DIAGNOSIS — Z12.4 CERVICAL CANCER SCREENING: ICD-10-CM

## 2025-07-08 DIAGNOSIS — E03.9 HYPOTHYROIDISM (ACQUIRED): Primary | Chronic | ICD-10-CM

## 2025-07-08 DIAGNOSIS — J45.40 MODERATE PERSISTENT ASTHMA WITHOUT COMPLICATION: Chronic | ICD-10-CM

## 2025-07-08 DIAGNOSIS — G43.709 CHRONIC MIGRAINE WITHOUT AURA WITHOUT STATUS MIGRAINOSUS, NOT INTRACTABLE: Chronic | ICD-10-CM

## 2025-07-08 DIAGNOSIS — R20.8 BURNING SENSATION OF FEET: ICD-10-CM

## 2025-07-08 DIAGNOSIS — R35.0 URINARY FREQUENCY: ICD-10-CM

## 2025-07-08 DIAGNOSIS — Z79.899 OTHER LONG TERM (CURRENT) DRUG THERAPY: ICD-10-CM

## 2025-07-08 PROBLEM — J01.00 ACUTE NON-RECURRENT MAXILLARY SINUSITIS: Status: RESOLVED | Noted: 2024-12-31 | Resolved: 2025-07-08

## 2025-07-08 LAB
ANION GAP SERPL CALCULATED.3IONS-SCNC: 9 MMOL/L (ref 7–16)
BILIRUB SERPL-MCNC: NEGATIVE MG/DL
BLOOD URINE, POC: ABNORMAL
BUN SERPL-MCNC: 13 MG/DL (ref 10–20)
BUN/CREAT SERPL: 14 (ref 6–20)
CALCIUM SERPL-MCNC: 10 MG/DL (ref 8.4–10.2)
CHLORIDE SERPL-SCNC: 102 MMOL/L (ref 98–107)
CLARITY, UA: ABNORMAL
CO2 SERPL-SCNC: 30 MMOL/L (ref 23–31)
COLOR, UA: YELLOW
CREAT SERPL-MCNC: 0.93 MG/DL (ref 0.55–1.02)
CREAT UR-MCNC: 54 MG/DL (ref 15–325)
EGFR (NO RACE VARIABLE) (RUSH/TITUS): 67 ML/MIN/1.73M2
EST. AVERAGE GLUCOSE BLD GHB EST-MCNC: 117 MG/DL
FOLATE SERPL-MCNC: 7.7 NG/ML (ref 7–31.4)
GLUCOSE SERPL-MCNC: 94 MG/DL (ref 82–115)
GLUCOSE UR QL STRIP: NEGATIVE
HBA1C MFR BLD HPLC: 5.7 %
KETONES UR QL STRIP: NEGATIVE
LEUKOCYTE ESTERASE URINE, POC: ABNORMAL
MICROALBUMIN UR-MCNC: <0.5 MG/DL
MICROALBUMIN/CREAT RATIO PNL UR: NORMAL
NITRITE, POC UA: NEGATIVE
PH, POC UA: 7
POTASSIUM SERPL-SCNC: 3.9 MMOL/L (ref 3.5–5.1)
PROTEIN, POC: NEGATIVE
SODIUM SERPL-SCNC: 137 MMOL/L (ref 136–145)
SPECIFIC GRAVITY, POC UA: 1.01
TSH SERPL DL<=0.005 MIU/L-ACNC: 1.72 UIU/ML (ref 0.35–4.94)
UROBILINOGEN, POC UA: 0.2
VIT B12 SERPL-MCNC: 500 PG/ML (ref 213–816)

## 2025-07-08 PROCEDURE — 3288F FALL RISK ASSESSMENT DOCD: CPT | Mod: ,,, | Performed by: NURSE PRACTITIONER

## 2025-07-08 PROCEDURE — 1101F PT FALLS ASSESS-DOCD LE1/YR: CPT | Mod: ,,, | Performed by: NURSE PRACTITIONER

## 2025-07-08 PROCEDURE — 3008F BODY MASS INDEX DOCD: CPT | Mod: ,,, | Performed by: NURSE PRACTITIONER

## 2025-07-08 PROCEDURE — 3044F HG A1C LEVEL LT 7.0%: CPT | Mod: ,,, | Performed by: NURSE PRACTITIONER

## 2025-07-08 PROCEDURE — 82570 ASSAY OF URINE CREATININE: CPT | Mod: ,,, | Performed by: CLINICAL MEDICAL LABORATORY

## 2025-07-08 PROCEDURE — 80048 BASIC METABOLIC PNL TOTAL CA: CPT | Mod: ,,, | Performed by: CLINICAL MEDICAL LABORATORY

## 2025-07-08 PROCEDURE — 3078F DIAST BP <80 MM HG: CPT | Mod: ,,, | Performed by: NURSE PRACTITIONER

## 2025-07-08 PROCEDURE — 84443 ASSAY THYROID STIM HORMONE: CPT | Mod: ,,, | Performed by: CLINICAL MEDICAL LABORATORY

## 2025-07-08 PROCEDURE — 1159F MED LIST DOCD IN RCRD: CPT | Mod: ,,, | Performed by: NURSE PRACTITIONER

## 2025-07-08 PROCEDURE — 83036 HEMOGLOBIN GLYCOSYLATED A1C: CPT | Mod: ,,, | Performed by: CLINICAL MEDICAL LABORATORY

## 2025-07-08 PROCEDURE — 3074F SYST BP LT 130 MM HG: CPT | Mod: ,,, | Performed by: NURSE PRACTITIONER

## 2025-07-08 PROCEDURE — 82746 ASSAY OF FOLIC ACID SERUM: CPT | Mod: ,,, | Performed by: CLINICAL MEDICAL LABORATORY

## 2025-07-08 PROCEDURE — 1160F RVW MEDS BY RX/DR IN RCRD: CPT | Mod: ,,, | Performed by: NURSE PRACTITIONER

## 2025-07-08 PROCEDURE — 82607 VITAMIN B-12: CPT | Mod: ,,, | Performed by: CLINICAL MEDICAL LABORATORY

## 2025-07-08 PROCEDURE — 99214 OFFICE O/P EST MOD 30 MIN: CPT | Mod: ,,, | Performed by: NURSE PRACTITIONER

## 2025-07-08 PROCEDURE — 82043 UR ALBUMIN QUANTITATIVE: CPT | Mod: ,,, | Performed by: CLINICAL MEDICAL LABORATORY

## 2025-07-08 PROCEDURE — 1126F AMNT PAIN NOTED NONE PRSNT: CPT | Mod: ,,, | Performed by: NURSE PRACTITIONER

## 2025-07-08 RX ORDER — SUMATRIPTAN SUCCINATE 100 MG/1
50 TABLET ORAL
Qty: 27 TABLET | Refills: 3 | Status: SHIPPED | OUTPATIENT
Start: 2025-07-08

## 2025-07-08 RX ORDER — MONTELUKAST SODIUM 10 MG/1
10 TABLET ORAL NIGHTLY
Qty: 90 TABLET | Refills: 3 | Status: SHIPPED | OUTPATIENT
Start: 2025-07-08

## 2025-07-08 RX ORDER — PROMETHAZINE HYDROCHLORIDE 25 MG/1
50 TABLET ORAL EVERY 4 HOURS PRN
Qty: 20 TABLET | Refills: 1 | Status: SHIPPED | OUTPATIENT
Start: 2025-07-08

## 2025-07-08 RX ORDER — ATORVASTATIN CALCIUM 20 MG/1
20 TABLET, FILM COATED ORAL DAILY
Qty: 90 TABLET | Refills: 3 | Status: SHIPPED | OUTPATIENT
Start: 2025-07-08

## 2025-07-08 NOTE — PROGRESS NOTES
[facility]  [address] [place] [address]  Phone: [phone]  Fax: [phone]       PATIENT NAME: Dominic Casas   : 1955    AGE: 69 y.o. DATE OF ENCOUNTER: 25    MRN: [id number]      PCP: Pita Ramos FNP    Subjective:   CHANGE CHIEF COMPLAINT      :[id number]}274}  Chief Complaint   Patient presents with    Hypertension     Patient reports to the clinic today for 6 month follow up. She has complaints of itching on her scalp and burning on the soles of her feet. She also complains of urinary frequency.    Health Maintenance     COVID-19 Vaccine( season) declined  Pap Smear needs scheduling and new provider due to Dr Arrington retiring  Mammogram scheduled for 2025       History of Present Illness    HPI:  Patient presents for 6-mth follow-up of hypertension, hypothyroidism, and prediabetes.  Patient reports urinary frequency, which prompted a urine test order. She also mentions burning sensations on the soles of her feet.    Her migraines have increased in frequency recently, which she attributes to weather changes. Overall, her migraines have significantly improved compared to the past, when she would attend appointments in severe discomfort.    Patient reports an incident of back pain that occurred while cleaning up after a family Father's Day meal. She had to use an ice pack, which was unusual as she typically does not have back issues. Patient is 69 years old and has not had a history of back problems.    She reports itching on her scalp. She changed her shampoo to see if it would help, but notes that the itching started before the change. She does not observe any rash.    Regarding her breathing, she reports doing well with the Wixela medication and she is followed by Pulmonary for asthma.    She denies any chest pain.      ROS:  Respiratory: -difficulty breathing  Cardiovascular: -chest pain  Genitourinary: +frequency  Musculoskeletal:  +back pain  Integumentary: -rash, +itching  Neurological: +burning sensation, +migraines         Allergies and Meds: 274}     Review of patient's allergies indicates:   Allergen Reactions    Codeine Hives and Itching        Current Outpatient Medications   Medication Sig Dispense Refill    albuterol (VENTOLIN HFA) 90 mcg/actuation inhaler Inhale 2 puffs into the lungs every 4 (four) hours as needed for Wheezing or Shortness of Breath. Rescue 18 g 1    albuterol-ipratropium (DUO-NEB) 2.5 mg-0.5 mg/3 mL nebulizer solution Take 3 mLs by nebulization every 6 (six) hours as needed for Wheezing. Rescue 150 mL 1    budesonide (PULMICORT) 0.5 mg/2 mL nebulizer solution Take 2 mLs (0.5 mg total) by nebulization every 12 (twelve) hours. 360 mL 1    levothyroxine (SYNTHROID) 88 MCG tablet Take 1 tablet (88 mcg total) by mouth before breakfast. 90 tablet 3    loratadine-pseudoephedrine 5-120 mg (CLARITIN-D 12-HOUR) 5-120 mg per tablet Take 1 tablet by mouth 2 (two) times daily.      losartan-hydrochlorothiazide 100-25 mg (HYZAAR) 100-25 mg per tablet Take 1 tablet by mouth once daily. 90 tablet 3    SUMAtriptan succinate 6 mg/0.5 mL Crtg Inject 0.5 mLs into the skin 2 (two) times daily as needed (HA). Take 1 injection at onset of migraine and can repeat in 1-2 hours if needed. No more than 2 doses in 24 hours. 9 each 5    WIXELA INHUB 250-50 mcg/dose diskus inhaler INHALE 1 PUFF INTO THE LUNGS 2 (TWO) TIMES DAILY. CONTROLLER 60 each 11    atorvastatin (LIPITOR) 20 MG tablet Take 1 tablet (20 mg total) by mouth once daily. 90 tablet 3    montelukast (SINGULAIR) 10 mg tablet Take 1 tablet (10 mg total) by mouth every evening. 90 tablet 3    promethazine (PHENERGAN) 25 MG tablet Take 2 tablets (50 mg total) by mouth every 4 (four) hours as needed for Nausea. 1-2 tablet every 4 hours as needed 20 tablet 1    sumatriptan (IMITREX) 100 MG tablet Take 0.5 tablets (50 mg total) by mouth every 2 (two) hours as needed for Migraine. May  repeat dose in 2 hrs once in 24 hours.  Max dosage 200 mg in 24 hours. 27 tablet 3     No current facility-administered medications for this visit.       Labs:274}   I have reviewed labs below:    Lab Results   Component Value Date    WBC 7.62 01/08/2025    RBC 4.87 01/08/2025    HGB 13.2 01/08/2025    HCT 41.8 01/08/2025     01/08/2025     01/08/2025    K 4.7 01/08/2025     01/08/2025    CALCIUM 9.2 01/08/2025    GLU 99 01/08/2025    BUN 17 01/08/2025    CREATININE 0.92 01/08/2025    EGFRNONAA 56 (L) 06/06/2022    ALT 30 01/08/2025    AST 21 01/08/2025    CHOL 145 01/08/2025    TRIG 168 (H) 01/08/2025    HDL 45 01/08/2025    LDLCALC 66 01/08/2025    TSH 2.371 01/08/2025    HGBA1C 5.9 01/08/2025    MICROALBUR <0.5 06/08/2023       Medical History: 274}     Past Medical History:   Diagnosis Date    Cholelithiasis     4/26/21 per US (report scanned in); HIDA scan 5/7/21 normal w/ EF49% (scanned in)    Chronic migraine without aura or status migrainosus     Hyperlipidemia     Hypertension     Hypothyroidism     Lumbar degenerative disc disease     Osteoarthritis of multiple joints     Spondylosis of cervical spine     Spondylosis, lumbosacral       Tobacco Use History[1]   Past Surgical History:   Procedure Laterality Date    JOINT REPLACEMENT  7/15, 10/17, 7/19    Right knee, both hips    PARTIAL KNEE ARTHROPLASTY Right 06/2017    Dr. Jacobs    SHOULDER ARTHROSCOPY Left 07/2012    left rotator cuff repair    TOTAL HIP ARTHROPLASTY Left 09/2017    TOTAL HIP ARTHROPLASTY Right 10/07/2019    Dr. Jacobs in [place]        Health Maintenance:      Health Maintenance Topics with due status: Not Due       Topic Last Completion Date    Colorectal Cancer Screening 06/24/2024    TETANUS VACCINE 07/15/2024    DEXA Scan 07/18/2024    Influenza Vaccine 01/07/2025    Lipid Panel 01/08/2025    Hemoglobin A1c (Prediabetes) 07/08/2025       Objective:  274}   Vital Signs  Temp: 98.5 °F (36.9 °C)  Temp Source:  "Oral  Pulse: 62  Resp: 20  SpO2: 95 %  BP: 120/74  BP Location: Left arm  Patient Position: Sitting  Pain Score: 0-No pain  Height and Weight  Height: 5' 8" (172.7 cm)  Weight: 110.9 kg (244 lb 9.6 oz)  BSA (Calculated - sq m): 2.31 sq meters  BMI (Calculated): 37.2  Weight in (lb) to have BMI = 25: 164.1    Over the last two weeks how often have you been bothered by little interest or pleasure in doing things: 0  Over the last two weeks how often have you been bothered by feeling down, depressed or hopeless: 0  PHQ-2 Total Score: 0  PHQ-9 Score: 0  PHQ-9 Interpretation: Minimal or None    Wt Readings from Last 3 Encounters:   07/08/25 110.9 kg (244 lb 9.6 oz)   03/12/25 112 kg (247 lb)   01/16/25 111.6 kg (246 lb)     Physical Exam  Vitals and nursing note reviewed.   Constitutional:       General: She is not in acute distress.     Appearance: Normal appearance. She is obese. She is not ill-appearing.   HENT:      Head: Normocephalic.   Eyes:      Conjunctiva/sclera: Conjunctivae normal.   Neck:      Trachea: Trachea normal.   Cardiovascular:      Rate and Rhythm: Normal rate and regular rhythm.      Pulses: Normal pulses.      Heart sounds: Normal heart sounds.   Pulmonary:      Effort: Pulmonary effort is normal. No respiratory distress.      Breath sounds: Normal breath sounds. No wheezing, rhonchi or rales.   Musculoskeletal:      Cervical back: Neck supple.      Right lower leg: No edema.      Left lower leg: No edema.   Lymphadenopathy:      Cervical: No cervical adenopathy.   Skin:     General: Skin is warm and dry.      Coloration: Skin is not jaundiced or pale.   Neurological:      General: No focal deficit present.      Mental Status: She is alert and oriented to person, place, and time.      Gait: Gait normal.   Psychiatric:         Mood and Affect: Mood normal.         Behavior: Behavior normal.          Assessment and Plan: 274}       1. Hypothyroidism (acquired)  Assessment & Plan:  Lab Results "   Component Value Date    TSH 2.371 01/08/2025     Continue levothyroxine and adjust if indicated.    Orders:  -     TSH; Future; Expected date: 07/08/2025    2. Mixed hyperlipidemia  -     atorvastatin (LIPITOR) 20 MG tablet; Take 1 tablet (20 mg total) by mouth once daily.  Dispense: 90 tablet; Refill: 3    3. Chronic migraine without aura without status migrainosus, not intractable  -     sumatriptan (IMITREX) 100 MG tablet; Take 0.5 tablets (50 mg total) by mouth every 2 (two) hours as needed for Migraine. May repeat dose in 2 hrs once in 24 hours.  Max dosage 200 mg in 24 hours.  Dispense: 27 tablet; Refill: 3  -     promethazine (PHENERGAN) 25 MG tablet; Take 2 tablets (50 mg total) by mouth every 4 (four) hours as needed for Nausea. 1-2 tablet every 4 hours as needed  Dispense: 20 tablet; Refill: 1    4. Chronic rhinitis  -     montelukast (SINGULAIR) 10 mg tablet; Take 1 tablet (10 mg total) by mouth every evening.  Dispense: 90 tablet; Refill: 3    5. Moderate persistent asthma without complication  -     montelukast (SINGULAIR) 10 mg tablet; Take 1 tablet (10 mg total) by mouth every evening.  Dispense: 90 tablet; Refill: 3    6. Essential (primary) hypertension  Assessment & Plan:  Well controlled  Continue losartan hctz.      7. Prediabetes  Assessment & Plan:  Lab Results   Component Value Date    HGBA1C 5.9 01/08/2025    HGBA1C 5.6 06/08/2023       Orders:  -     Hemoglobin A1C; Future; Expected date: 07/08/2025  -     Basic Metabolic Panel; Future; Expected date: 07/08/2025  -     Microalbumin/Creatinine Ratio, Urine; Future; Expected date: 07/08/2025    8. Urinary frequency  -     POCT URINALYSIS W/O SCOPE  -     Urinalysis, Reflex to Urine Culture; Future; Expected date: 07/08/2025    9. Burning sensation of feet  -     Hemoglobin A1C; Future; Expected date: 07/08/2025  -     Vitamin B12 & Folate; Future; Expected date: 07/08/2025    10. Other long term (current) drug therapy  -     Vitamin B12 &  Folate; Future; Expected date: 07/08/2025  -     Basic Metabolic Panel; Future; Expected date: 07/08/2025  -     Microalbumin/Creatinine Ratio, Urine; Future; Expected date: 07/08/2025    11. Cervical cancer screening  -     Ambulatory referral/consult to Obstetrics / Gynecology; Future; Expected date: 07/15/2025        Assessment & Plan    IMPRESSION:  - BP improved to 120/74.  - Evaluated thyroid function and A1C level to monitor hypothyroidism and prediabetes.  - Considered potential causes for reported urinary frequency and burning sensation in feet, including diabetes-related neuropathy and B12 deficiency.  - Ordered urinalysis with potential culture due to slightly cloudy urine with small amount of WBCs.  - Migraine status improved from past.    HYPOTHYROIDISM:  - Ordered thyroid function test to check if current dosage is appropriate.  - Continue levothyroxine 88 mcg daily pending lab results.  - Follow up in 6 months with fasting labs.    PREDIABETES:  - Ordered A1C test today; last A1C in January was 5.9%.  - Noted weight reduction of 2 lbs from January and 3 lbs from March.  - Ordered microalbumin test.  - Advised patient to work on improving glucose levels alongside spouse's efforts.  - Follow up in 6 months with fasting labs.    HYPERTENSION:  - Blood pressure today 120/74, improved from last visit and within target range.  - Continue losartan hydrochlorothiazide 100/25 mg daily.  - Ordered microalbumin test.  - Follow up in 6 months with fasting labs.    HYPERLIPIDEMIA:  - Follow up in 6 months with fasting labs.    MIGRAINE:  - Migraines have increased slightly but remain significantly improved compared to previous episodes.    URINARY FREQUENCY:  - Patient reported urinary frequency.  - Observed slightly cloudy urine with a small amount of leukocytes.  - Sent urine sample for further investigation and possible culture.    PARESTHESIA:  - Patient reports burning sensation on the soles of feet.  -  Ordered B12 and folate levels to investigate potential neuropathy.    PRURITUS:  - Patient reports pruritus on the scalp.  - Examination revealed no evidence of rash.    OTHER MANAGEMENT AND FOLLOW-UP:  - Continue Wixela for breathing management.  - Ordered B12 and folate level tests and metabolic panel (non-fasting).  - Referred to GYN for pap smear update, Paola Xiong CNM.  - Medicare annual wellness visit is scheduled for January.       Follow up in about 6 months (around 1/8/2026) for hypertension, hyperlipidemia, hypothyroidism.    Signature:  NELI Badillo-BC    Future Appointments   Date Time Provider Department Center   7/14/2025  4:00 PM Paola Xiong CNM [facility]   7/24/2025 10:20 AM [facility] MAMMO1 [facility] [place]   8/5/2025  3:00 PM Liset Ewing MD [facility]  [facility]   1/8/2026  9:00 AM Pita Ramos FNP [facility] [place]   1/22/2026  2:00 PM AWV NURSE, [facility]     This note was generated with the assistance of ambient listening technology. Verbal consent was obtained by the patient and accompanying visitor(s) for the recording of patient appointment to facilitate this note. I attest to having reviewed and edited the generated note for accuracy, though some syntax or spelling errors may persist. Please contact the author of this note for any clarification.           [1]   Social History  Tobacco Use   Smoking Status Never    Passive exposure: Never   Smokeless Tobacco Never   Tobacco Comments    None      Rituxan Counseling:  I discussed with the patient the risks of Rituxan infusions. Side effects can include infusion reactions, severe drug rashes including mucocutaneous reactions, reactivation of latent hepatitis and other infections and rarely progressive multifocal leukoencephalopathy.  All of the patient's questions and concerns were addressed.

## 2025-07-08 NOTE — ASSESSMENT & PLAN NOTE
Lab Results   Component Value Date    TSH 2.371 01/08/2025     Continue levothyroxine and adjust if indicated.

## 2025-07-14 ENCOUNTER — OFFICE VISIT (OUTPATIENT)
Facility: CLINIC | Age: 70
End: 2025-07-14
Payer: MEDICARE

## 2025-07-14 VITALS
WEIGHT: 244 LBS | BODY MASS INDEX: 36.98 KG/M2 | HEART RATE: 77 BPM | DIASTOLIC BLOOD PRESSURE: 81 MMHG | OXYGEN SATURATION: 99 % | RESPIRATION RATE: 17 BRPM | HEIGHT: 68 IN | SYSTOLIC BLOOD PRESSURE: 129 MMHG

## 2025-07-14 DIAGNOSIS — Z01.419 WOMEN'S ANNUAL ROUTINE GYNECOLOGICAL EXAMINATION: Primary | ICD-10-CM

## 2025-07-14 DIAGNOSIS — Z12.4 CERVICAL CANCER SCREENING: ICD-10-CM

## 2025-07-14 DIAGNOSIS — E66.9 OBESITY (BMI 30-39.9): ICD-10-CM

## 2025-07-14 DIAGNOSIS — N84.1 CERVICAL POLYP: ICD-10-CM

## 2025-07-14 DIAGNOSIS — Z11.51 SCREENING FOR HPV (HUMAN PAPILLOMAVIRUS): ICD-10-CM

## 2025-07-14 PROCEDURE — 1159F MED LIST DOCD IN RCRD: CPT | Mod: ,,, | Performed by: ADVANCED PRACTICE MIDWIFE

## 2025-07-14 PROCEDURE — 3074F SYST BP LT 130 MM HG: CPT | Mod: ,,, | Performed by: ADVANCED PRACTICE MIDWIFE

## 2025-07-14 PROCEDURE — 3288F FALL RISK ASSESSMENT DOCD: CPT | Mod: ,,, | Performed by: ADVANCED PRACTICE MIDWIFE

## 2025-07-14 PROCEDURE — 3008F BODY MASS INDEX DOCD: CPT | Mod: ,,, | Performed by: ADVANCED PRACTICE MIDWIFE

## 2025-07-14 PROCEDURE — 57500 BIOPSY OF CERVIX: CPT | Mod: ,,, | Performed by: ADVANCED PRACTICE MIDWIFE

## 2025-07-14 PROCEDURE — 3079F DIAST BP 80-89 MM HG: CPT | Mod: ,,, | Performed by: ADVANCED PRACTICE MIDWIFE

## 2025-07-14 PROCEDURE — 1101F PT FALLS ASSESS-DOCD LE1/YR: CPT | Mod: ,,, | Performed by: ADVANCED PRACTICE MIDWIFE

## 2025-07-14 PROCEDURE — 88142 CYTOPATH C/V THIN LAYER: CPT | Mod: TC,GCY | Performed by: ADVANCED PRACTICE MIDWIFE

## 2025-07-14 PROCEDURE — 88305 TISSUE EXAM BY PATHOLOGIST: CPT | Mod: TC,SUR | Performed by: ADVANCED PRACTICE MIDWIFE

## 2025-07-14 PROCEDURE — G0101 CA SCREEN;PELVIC/BREAST EXAM: HCPCS | Mod: ,,, | Performed by: ADVANCED PRACTICE MIDWIFE

## 2025-07-14 PROCEDURE — 88305 TISSUE EXAM BY PATHOLOGIST: CPT | Mod: 26,,, | Performed by: PATHOLOGY

## 2025-07-14 PROCEDURE — 3044F HG A1C LEVEL LT 7.0%: CPT | Mod: ,,, | Performed by: ADVANCED PRACTICE MIDWIFE

## 2025-07-14 NOTE — PROGRESS NOTES
Patient ID: Dominic Casas is a 69 y.o. female     Chief Complaint:   Chief Complaint   Patient presents with    Annual Exam     With pap smear       HPI: Dominic Casas is a 69 y.o. female who presents as a new patient for well woman exam.  No gyn issues, problems, or complaints.  Last Pap NILM 4/25/22 done by Dr. Arrington.   LMP: No LMP recorded. Patient is postmenopausal.  Sexually active: no, , declines STD testing   Contraceptive: n/a  Mammogram: scheduled 7/24/25    Past Medical History:   Diagnosis Date    Cholelithiasis     4/26/21 per US (report scanned in); HIDA scan 5/7/21 normal w/ EF49% (scanned in)    Chronic migraine without aura or status migrainosus     Hyperlipidemia     Hypertension     Hypothyroidism     Lumbar degenerative disc disease     Osteoarthritis of multiple joints     Spondylosis of cervical spine     Spondylosis, lumbosacral        Past Surgical History:   Procedure Laterality Date    JOINT REPLACEMENT  7/15, 10/17, 7/19    Right knee, both hips    PARTIAL KNEE ARTHROPLASTY Right 06/2017    Dr. Jacobs    SHOULDER ARTHROSCOPY Left 07/2012    left rotator cuff repair    TOTAL HIP ARTHROPLASTY Left 09/2017    TOTAL HIP ARTHROPLASTY Right 10/07/2019    Dr. Jacobs in [place]       Social History[1]    Family History   Problem Relation Name Age of Onset    Alzheimer's disease Mother Shannan Yee Rojo Sarkar     Dementia Mother Shannan Quinne Sarkar     Colon cancer Mother Shannan Yee Rojo Sarkar     Arthritis Mother Shannan Yee Rojo Sarkar     Cancer Mother Shannan Yee Rojo Sarkar     Stroke Mother Shannan Chauhanentine Sarkar     Cancer Father CAREN Rojo         Waldenstrom's macroglobulinemia    Arthritis Father WSebastina Rojo     No Known Problems Sister      Arthritis Maternal Grandmother Shannan Doll     Migraines Maternal Grandfather Filipe Doll     Arthritis Maternal Grandfather Filipe Doll     Arthritis Paternal Grandmother Laila  "Alia     No Known Problems Paternal Grandfather      No Known Problems Sister         OB History          0    Para   0    Term   0       0    AB   0    Living   0         SAB   0    IAB   0    Ectopic   0    Multiple   0    Live Births   0                 /81 (BP Location: Right arm, Patient Position: Sitting)   Pulse 77   Resp 17   Ht 5' 8" (1.727 m)   Wt 110.7 kg (244 lb)   SpO2 99%   BMI 37.10 kg/m²     Wt Readings from Last 3 Encounters:   25 110.7 kg (244 lb)   25 110.9 kg (244 lb 9.6 oz)   25 112 kg (247 lb)        Review of Systems   Constitutional: Negative.    Eyes: Negative.    Respiratory: Negative.     Cardiovascular: Negative.    Gastrointestinal: Negative.    Endocrine: Negative.    Genitourinary: Negative.    Musculoskeletal: Negative.    Integumentary:  Negative.   Neurological: Negative.    Hematological: Negative.    Psychiatric/Behavioral: Negative.     Breast: negative.         Physical Exam  Constitutional:       Appearance: Normal appearance.   HENT:      Head: Normocephalic.      Nose: Nose normal.   Eyes:      Extraocular Movements: Extraocular movements intact.   Cardiovascular:      Rate and Rhythm: Normal rate and regular rhythm.      Pulses: Normal pulses.      Heart sounds: Normal heart sounds.   Pulmonary:      Effort: Pulmonary effort is normal.      Breath sounds: Normal breath sounds.   Abdominal:      Palpations: Abdomen is soft.      Hernia: There is no hernia in the left inguinal area or right inguinal area.   Genitourinary:     General: Normal vulva.      Exam position: Lithotomy position.      Pubic Area: No rash.       Labia:         Right: No rash.         Left: No rash.       Urethra: No prolapse.      Vagina: Normal.      Cervix: Normal.      Uterus: Normal.       Adnexa: Right adnexa normal and left adnexa normal.      Rectum: Normal.            Comments: Pressure applied to cervix with gauze to stop/minimize bleeding. "   Musculoskeletal:         General: Normal range of motion.      Cervical back: Normal range of motion and neck supple.   Skin:     General: Skin is warm and dry.   Neurological:      Mental Status: She is alert and oriented to person, place, and time.   Psychiatric:         Mood and Affect: Mood normal.         Behavior: Behavior normal.         Thought Content: Thought content normal.         Judgment: Judgment normal.          Assessment:  Women's annual routine gynecological examination  -     ThinPrep Pap Test; Future; Expected date: 07/14/2025    Cervical cancer screening  -     Ambulatory referral/consult to Obstetrics / Gynecology  -     ThinPrep Pap Test; Future; Expected date: 07/14/2025    Cervical polyp  -     Surgical Pathology; Future; Expected date: 07/14/2025    Screening for HPV (human papillomavirus)  -     ThinPrep Pap Test; Future; Expected date: 07/14/2025    Obesity (BMI 30-39.9)          ICD-10-CM ICD-9-CM    1. Women's annual routine gynecological examination  Z01.419 V72.31 ThinPrep Pap Test      ThinPrep Pap Test      2. Cervical cancer screening  Z12.4 V76.2 Ambulatory referral/consult to Obstetrics / Gynecology      ThinPrep Pap Test      ThinPrep Pap Test      3. Cervical polyp  N84.1 622.7 Surgical Pathology      Surgical Pathology      4. Screening for HPV (human papillomavirus)  Z11.51 V73.81 ThinPrep Pap Test      ThinPrep Pap Test      5. Obesity (BMI 30-39.9)  E66.9 278.00           Plan:  Annual exam completed. Pap specimen collected  Cervical polyp specimen to pathology  Will call or send My Chart message after results reviewed  Patient was counseled today on ASCCP Pap with HPV screening guidelines and recommendations for yearly pelvic exams       Follow up in about 1 year (around 7/14/2026) for annual gyn exam .      Answers submitted by the patient for this visit:  Gynecologic Exam Questionnaire  (Submitted on 7/9/2025)  Chief Complaint: Gynecologic exam  Sexual activity: not  sexually active  Menstrual history: postmenopausal  STD: No  abdominal surgery: No   section: No  Ectopic pregnancy: No  Endometriosis: No  herpes simplex: No  gynecological surgery: No  menorrhagia: No  metrorrhagia: No  miscarriage: No  ovarian cysts: No  perineal abscess: No  PID: No  terminated pregnancy: No  vaginosis: No         [1]   Social History  Socioeconomic History    Marital status:    Tobacco Use    Smoking status: Never     Passive exposure: Never    Smokeless tobacco: Never    Tobacco comments:     None   Substance and Sexual Activity    Alcohol use: Never    Drug use: Never    Sexual activity: Not Currently     Partners: Male     Birth control/protection: Post-menopausal     Comment:      Social Drivers of Health     Financial Resource Strain: Low Risk  (2025)    Overall Financial Resource Strain (CARDIA)     Difficulty of Paying Living Expenses: Not hard at all   Food Insecurity: No Food Insecurity (2025)    Hunger Vital Sign     Worried About Running Out of Food in the Last Year: Never true     Ran Out of Food in the Last Year: Never true   Transportation Needs: No Transportation Needs (2025)    PRAPARE - Transportation     Lack of Transportation (Medical): No     Lack of Transportation (Non-Medical): No   Physical Activity: Insufficiently Active (2025)    Exercise Vital Sign     Days of Exercise per Week: 4 days     Minutes of Exercise per Session: 20 min   Stress: No Stress Concern Present (2025)    [employer] - Occupational Stress Questionnaire     Feeling of Stress : Not at all   Housing Stability: Unknown (2025)    Housing Stability Vital Sign     Unable to Pay for Housing in the Last Year: No     Homeless in the Last Year: No

## 2025-07-16 LAB
ESTROGEN SERPL-MCNC: NORMAL PG/ML
GH SERPL-MCNC: NORMAL NG/ML
INSULIN SERPL-ACNC: NORMAL U[IU]/ML
INSULIN SERPL-ACNC: NORMAL U[IU]/ML
LAB AP CLINICAL INFORMATION: NORMAL
LAB AP CLINICAL INFORMATION: NORMAL
LAB AP GROSS DESCRIPTION: NORMAL
LAB AP GYN INTERPRETATION: NEGATIVE
LAB AP LABORATORY NOTES: NORMAL
LAB AP PAP DISCLAIMER COMMENTS: NORMAL
RENIN PLAS-CCNC: NORMAL NG/ML/H
T3RU NFR SERPL: NORMAL %

## 2025-07-17 LAB
HPV 16: NEGATIVE
HPV 18: NEGATIVE
HPV OTHER: NEGATIVE

## 2025-07-24 ENCOUNTER — HOSPITAL ENCOUNTER (OUTPATIENT)
Dept: RADIOLOGY | Facility: HOSPITAL | Age: 70
Discharge: HOME | End: 2025-07-24
Attending: NURSE PRACTITIONER
Payer: MEDICARE

## 2025-07-24 VITALS — HEIGHT: 68 IN | BODY MASS INDEX: 36.37 KG/M2 | WEIGHT: 240 LBS

## 2025-07-24 DIAGNOSIS — Z12.31 OTHER SCREENING MAMMOGRAM: ICD-10-CM

## 2025-07-24 PROCEDURE — 77063 BREAST TOMOSYNTHESIS BI: CPT | Mod: 26,,, | Performed by: RADIOLOGY

## 2025-07-24 PROCEDURE — 77063 BREAST TOMOSYNTHESIS BI: CPT | Mod: TC

## 2025-07-24 PROCEDURE — 77067 SCR MAMMO BI INCL CAD: CPT | Mod: 26,,, | Performed by: RADIOLOGY

## 2025-07-31 ENCOUNTER — EXTERNAL CHRONIC CARE MANAGEMENT (OUTPATIENT)
Dept: FAMILY MEDICINE | Facility: CLINIC | Age: 70
End: 2025-07-31
Payer: MEDICARE

## 2025-07-31 PROCEDURE — 99490 CHRNC CARE MGMT STAFF 1ST 20: CPT | Mod: ,,, | Performed by: NURSE PRACTITIONER

## 2025-08-05 ENCOUNTER — OFFICE VISIT (OUTPATIENT)
Dept: PULMONOLOGY | Facility: CLINIC | Age: 70
End: 2025-08-05
Payer: MEDICARE

## 2025-08-05 VITALS
WEIGHT: 240 LBS | OXYGEN SATURATION: 96 % | HEART RATE: 73 BPM | RESPIRATION RATE: 18 BRPM | DIASTOLIC BLOOD PRESSURE: 63 MMHG | HEIGHT: 68 IN | SYSTOLIC BLOOD PRESSURE: 121 MMHG | BODY MASS INDEX: 36.37 KG/M2

## 2025-08-05 DIAGNOSIS — J45.20 MILD INTERMITTENT ASTHMA WITHOUT COMPLICATION: Primary | ICD-10-CM

## 2025-08-05 PROCEDURE — 99214 OFFICE O/P EST MOD 30 MIN: CPT | Mod: PBBFAC | Performed by: STUDENT IN AN ORGANIZED HEALTH CARE EDUCATION/TRAINING PROGRAM

## 2025-08-05 PROCEDURE — 3044F HG A1C LEVEL LT 7.0%: CPT | Mod: CPTII,,, | Performed by: STUDENT IN AN ORGANIZED HEALTH CARE EDUCATION/TRAINING PROGRAM

## 2025-08-05 PROCEDURE — 3074F SYST BP LT 130 MM HG: CPT | Mod: CPTII,,, | Performed by: STUDENT IN AN ORGANIZED HEALTH CARE EDUCATION/TRAINING PROGRAM

## 2025-08-05 PROCEDURE — 1159F MED LIST DOCD IN RCRD: CPT | Mod: CPTII,,, | Performed by: STUDENT IN AN ORGANIZED HEALTH CARE EDUCATION/TRAINING PROGRAM

## 2025-08-05 PROCEDURE — 1126F AMNT PAIN NOTED NONE PRSNT: CPT | Mod: CPTII,,, | Performed by: STUDENT IN AN ORGANIZED HEALTH CARE EDUCATION/TRAINING PROGRAM

## 2025-08-05 PROCEDURE — 1160F RVW MEDS BY RX/DR IN RCRD: CPT | Mod: CPTII,,, | Performed by: STUDENT IN AN ORGANIZED HEALTH CARE EDUCATION/TRAINING PROGRAM

## 2025-08-05 PROCEDURE — 3288F FALL RISK ASSESSMENT DOCD: CPT | Mod: CPTII,,, | Performed by: STUDENT IN AN ORGANIZED HEALTH CARE EDUCATION/TRAINING PROGRAM

## 2025-08-05 PROCEDURE — 1101F PT FALLS ASSESS-DOCD LE1/YR: CPT | Mod: CPTII,,, | Performed by: STUDENT IN AN ORGANIZED HEALTH CARE EDUCATION/TRAINING PROGRAM

## 2025-08-05 PROCEDURE — 3078F DIAST BP <80 MM HG: CPT | Mod: CPTII,,, | Performed by: STUDENT IN AN ORGANIZED HEALTH CARE EDUCATION/TRAINING PROGRAM

## 2025-08-05 PROCEDURE — 99213 OFFICE O/P EST LOW 20 MIN: CPT | Mod: S$PBB,,, | Performed by: STUDENT IN AN ORGANIZED HEALTH CARE EDUCATION/TRAINING PROGRAM

## 2025-08-05 PROCEDURE — 99999 PR PBB SHADOW E&M-EST. PATIENT-LVL IV: CPT | Mod: PBBFAC,,, | Performed by: STUDENT IN AN ORGANIZED HEALTH CARE EDUCATION/TRAINING PROGRAM

## 2025-08-05 PROCEDURE — 3008F BODY MASS INDEX DOCD: CPT | Mod: CPTII,,, | Performed by: STUDENT IN AN ORGANIZED HEALTH CARE EDUCATION/TRAINING PROGRAM

## 2025-08-05 NOTE — ASSESSMENT & PLAN NOTE
69 y.o. F with long standing history of allergic symptoms and intermittent wheezing presents for evaluation of recurrent wheezing and shortness of breath that has progressed since diagnosis with COVID in 2023. SEos on labs today with elevation to 580. She has had two exacerbations this year managed outpatient. History is consistent with eosinophilic asthma and PFTs demonstrating robust BD response which confirms diagnosis of eosinophilic asthma.   - she has improved control of her asthma now, seldom requiring rescue inhaler  - will hold ICS-LABA for now, step down with improved control with plan to resume if she requires IZA x2 over 24 hours  - cont IZA PRN; has nebulizer Rx from previous exacerbation to use PRN as well  - cont montelukast QDay

## 2025-08-05 NOTE — PROGRESS NOTES
[facility]  Pulmonology  ESTABLISHED VISIT     Patient Name:  Dominic Casas  Primary Care Provider: Pita Ramos FNP  Date of Service: 08/05/2025    Chief Complaint: Shortness of breath    SUBJECTIVE   HPI:  Dominic Casas is a 69 y.o. female with moderate persistent asthma, HTN, HLD, hypothyroidism, chronic migraine and spondylosis of cervical & lumbar spine presents for follow up of asthma. Last seen 06/2024 with initiation of asthma maintenance therapy with ICS-LABA.    Ms. Casas presents for a follow-up visit to discuss her asthma management. Ms. Casas reports improved breathing and asthma control over the past year, with reduced symptoms. She is currently using a maintenance inhaler (Wixela) twice daily and an as-needed inhaler, but not the nebulizer. She would use the nebulizer if she had respiratory heaviness or congestion. Her asthma is allergic in nature, with potential exacerbations during seasonal changes. She has not required hospitalization for severe exacerbations.      Initial HPI  Augusto reports having wheezing episodes associated with shortness of breath. Overall she notes her breathing and wheezing symptoms have persistent since having COVID infection. She has a cough that is typically non productive if all scant clear phlegm. She has wheezing that is audible. She states that she has been diagnosed with allergies in the past. She was treated with albuterol as needed. She had the Flu on January 1st with a cough that lingered for weeks. She has just not felt herself with regards to breathing since then.    She presented to PCP today and tested negative for Flu, Covid and Strep. She was started on montelukast. She had COVID infection 10/2023 that did not require hospitalization.  She adds that her grandchild has noticed swelling in her leg. Her swelling has been presetn for about 2 weeks. She has associated pain at the back of her knee.   06/2024: reports having improvement in her  breathing since last visit. She believes the montelukast has helped significantly with her symptoms. She is using Pulmicort twice daily and DuoNebs as needed. No interval exacerbations since last seen. She has noticed an increase in her albuterol use with change in weather; she has more migraines a month as well for the same reason.    Past Medical History:   Diagnosis Date    Cholelithiasis     4/26/21 per [place] (report scanned in); HIDA scan 5/7/21 normal w/ EF49% (scanned in)    Chronic migraine without aura or status migrainosus     Hyperlipidemia     Hypertension     Hypothyroidism     Lumbar degenerative disc disease     Osteoarthritis of multiple joints     Spondylosis of cervical spine     Spondylosis, lumbosacral        Past Surgical History:   Procedure Laterality Date    JOINT REPLACEMENT  7/15, 10/17, 7/19    Right knee, both hips    PARTIAL KNEE ARTHROPLASTY Right 06/2017    Dr. Jacobs    SHOULDER ARTHROSCOPY Left 07/2012    left rotator cuff repair    TOTAL HIP ARTHROPLASTY Left 09/2017    TOTAL HIP ARTHROPLASTY Right 10/07/2019    Dr[place]       Family History   Problem Relation Name Age of Onset    Alzheimer's disease Mother Shannan Sarkar     Dementia Mother Shannan Sarkar     Colon cancer Mother Shannan Sarkar     Arthritis Mother Shannan Quinne Antonina     Cancer Mother Shannan Quinne Antonina     Stroke Mother Shannan Sarkar     Cancer Father CAREN Rojo         Waldenstrom's macroglobulinemia    Arthritis Father CAREN Rojo     No Known Problems Sister      Arthritis Maternal Grandmother Shannan Doll     Migraines Maternal Grandfather Filipe Doll     Arthritis Maternal Grandfather Filipe Doll     Arthritis Paternal Grandmother Michaelsyed Rojo     No Known Problems Paternal Grandfather      No Known Problems Sister          Social History     Socioeconomic History    Marital status:    Tobacco  "Use    Smoking status: Never     Passive exposure: Never    Smokeless tobacco: Never    Tobacco comments:     None   Substance and Sexual Activity    Alcohol use: Never    Drug use: Never    Sexual activity: Not Currently     Partners: Male     Birth control/protection: Post-menopausal     Comment:      Social Drivers of Health     Financial Resource Strain: Low Risk  (1/16/2025)    Overall Financial Resource Strain (CARDIA)     Difficulty of Paying Living Expenses: Not hard at all   Food Insecurity: No Food Insecurity (1/16/2025)    Hunger Vital Sign     Worried About Running Out of Food in the Last Year: Never true     Ran Out of Food in the Last Year: Never true   Transportation Needs: No Transportation Needs (1/16/2025)    PRAPARE - Transportation     Lack of Transportation (Medical): No     Lack of Transportation (Non-Medical): No   Physical Activity: Insufficiently Active (1/16/2025)    Exercise Vital Sign     Days of Exercise per Week: 4 days     Minutes of Exercise per Session: 20 min   Stress: No Stress Concern Present (1/16/2025)    [employer] - Occupational Stress Questionnaire     Feeling of Stress : Not at all   Housing Stability: Unknown (1/16/2025)    Housing Stability Vital Sign     Unable to Pay for Housing in the Last Year: No     Homeless in the Last Year: No       Social History     Social History Narrative    Not on file       Review of patient's allergies indicates:   Allergen Reactions    Codeine Hives and Itching        Medications: Medications reviewed to include over the counter medications.    Review of Systems: A focused ROS was completed and found to be negative except for that mentioned above.      OBJECTIVE   PHYSICAL EXAM:  Vitals:    08/05/25 1516   BP: 121/63   BP Location: Left arm   Patient Position: Sitting   Pulse: 73   Resp: 18   SpO2: 96%   Weight: 108.9 kg (240 lb)   Height: 5' 8" (1.727 m)       GENERAL: NAD  HEENT: normocephalic, " non-icteric conjunctivae, moist oral mucosa  RESPIRATORY: clear to auscultation in bilateral lung fields, no wheezes, rales or rhonchi  CARDIOVASCULAR: Regular rate and rhythm, no murmurs rubs or gallops  SKIN: no rash, jaundice, ecchymosis or ulcers in exposed skin  MUSCULOSKELETAL: No clubbing or cyanosis  NEUROLOGIC: AO ×3, no gross deficits    LABS:  Lab studies reviewed and notable for H/H 12/38.2, SEos 580    IMAGING:  Imaging reviewed and notable for CXR 02/2024 with clear lung fields bilaterally, no pleural effusion    LUNG FUNCTION TESTING:   SPIROMETRY/PFT:  06/2024 Pre Post   FVC 2.45/-1.65 2.74/-1.10 +BD   FEV1 1.83/-1.77 2.08/-1.18 +BD   FEV1/FVC 75/-0.40 76/-0.27   TLC 4.72/-1.45    FRC  2.88/-0.59    RV 2.27/-0.19    DLCO 17.44/-1.46      ASSESSMENT & PLAN     Assessment & Plan    J45.30 Mild persistent asthma, uncomplicated  J45.31 Mild persistent asthma with (acute) exacerbation  Z79.51 Long term (current) use of inhaled steroids    IMPRESSION:  Assessed asthma control, noting improved breathing and reduced need for albuterol nebulizer.  Explained asthma control levels (mild to moderate) and how they can fluctuate with seasons.  Decreased Wixela (inhaled corticosteroid) from twice daily to as-needed basis due to improved asthma control.    MILD PERSISTENT ASTHMA:  - Discussed seasonal variations in asthma symptoms and potential need for medication adjustments, particularly in spring and fall.  - Discussed potential fall asthma triggers, including decaying plants and fungal spores.  - Ms. Toms to monitor frequency of albuterol inhaler use as an indicator of asthma control.  - Continue albuterol inhaler use as rescue medication. Restart daily Wixela if albuterol inhaler is needed twice within 24 hours.  - Contact the office if experiencing increased asthma symptoms, particularly in fall.    LONG TERM USE OF INHALED STEROIDS:  - Message the office for medication refills as needed.    FOLLOW-UP:  -  Follow up in 1 year.          1. Mild intermittent asthma without complication  Assessment & Plan:  69 y.o. F with long standing history of allergic symptoms and intermittent wheezing presents for evaluation of recurrent wheezing and shortness of breath that has progressed since diagnosis with COVID in 2023. SEos on labs today with elevation to 580. She has had two exacerbations this year managed outpatient. History is consistent with eosinophilic asthma and PFTs demonstrating robust BD response which confirms diagnosis of eosinophilic asthma.   - she has improved control of her asthma now, seldom requiring rescue inhaler  - will hold ICS-LABA for now, step down with improved control with plan to resume if she requires IZA x2 over 24 hours  - cont IZA PRN; has nebulizer Rx from previous exacerbation to use PRN as well  - cont montelukast QDay           This note was generated with the assistance of ambient listening technology. Verbal consent was obtained by the patient and accompanying visitor(s) for the recording of patient appointment to facilitate this note. I attest to having reviewed and edited the generated note for accuracy, though some syntax or spelling errors may persist. Please contact the author of this note for any clarification.       Follow up in about 1 year (around 8/5/2026) for Routine follow up.    Case was discussed with patient; all questions were answered to patient's satisfaction and patient verbalized understanding.     Liset Ewing MD  Pulmonary Medicine  [facility]  Phone: [phone]